# Patient Record
Sex: FEMALE | Race: BLACK OR AFRICAN AMERICAN | NOT HISPANIC OR LATINO | Employment: UNEMPLOYED | URBAN - METROPOLITAN AREA
[De-identification: names, ages, dates, MRNs, and addresses within clinical notes are randomized per-mention and may not be internally consistent; named-entity substitution may affect disease eponyms.]

---

## 2018-10-15 ENCOUNTER — OFFICE VISIT (OUTPATIENT)
Dept: URGENT CARE | Facility: CLINIC | Age: 48
End: 2018-10-15
Payer: MEDICAID

## 2018-10-15 VITALS
BODY MASS INDEX: 35.33 KG/M2 | OXYGEN SATURATION: 100 % | HEART RATE: 77 BPM | DIASTOLIC BLOOD PRESSURE: 72 MMHG | SYSTOLIC BLOOD PRESSURE: 109 MMHG | HEIGHT: 62 IN | RESPIRATION RATE: 16 BRPM | WEIGHT: 192 LBS | TEMPERATURE: 98 F

## 2018-10-15 DIAGNOSIS — R00.2 HEART PALPITATIONS: Primary | ICD-10-CM

## 2018-10-15 DIAGNOSIS — F41.9 ANXIETY: ICD-10-CM

## 2018-10-15 DIAGNOSIS — Z76.89 ENCOUNTER TO ESTABLISH CARE: ICD-10-CM

## 2018-10-15 LAB
GLUCOSE SERPL-MCNC: 90 MG/DL (ref 70–110)
POC ANION GAP: 16 MMOL/L (ref 10–20)
POC BUN: 12 MMOL/L (ref 8–26)
POC CHLORIDE: 102 MMOL/L (ref 98–109)
POC CREATININE: 0.7 MG/DL (ref 0.6–1.3)
POC HEMATOCRIT: 43 %PCV (ref 37–47)
POC HEMOGLOBIN: 14.6 G/DL (ref 12.5–16)
POC ICA: 1.3 MMOL/L (ref 1.12–1.32)
POC POTASSIUM: 4 MMOL/L (ref 3.5–4.9)
POC SODIUM: 139 MMOL/L (ref 138–146)
POC TCO2: 27 MMOL/L (ref 24–29)

## 2018-10-15 PROCEDURE — 80047 BASIC METABLC PNL IONIZED CA: CPT | Mod: QW,S$GLB,, | Performed by: NURSE PRACTITIONER

## 2018-10-15 PROCEDURE — 3008F BODY MASS INDEX DOCD: CPT | Mod: CPTII,S$GLB,, | Performed by: NURSE PRACTITIONER

## 2018-10-15 PROCEDURE — 99204 OFFICE O/P NEW MOD 45 MIN: CPT | Mod: S$GLB,,, | Performed by: NURSE PRACTITIONER

## 2018-10-15 RX ORDER — HYDROXYZINE PAMOATE 50 MG/1
50-100 CAPSULE ORAL 4 TIMES DAILY PRN
Qty: 30 CAPSULE | Refills: 0 | Status: SHIPPED | OUTPATIENT
Start: 2018-10-15 | End: 2021-03-30

## 2018-10-15 NOTE — PATIENT INSTRUCTIONS
Referral placed for internal medicine. Call [phone] to schedule an appointment. Dr. Cristin Cortes.   Avoid caffeine and stimulants.       Please return here or go to the Emergency Department for any concerns or worsening of condition.  If you were prescribed antibiotics, please take them to completion.  If you were prescribed a narcotic medication, do not drive or operate heavy equipment or machinery while taking these medications.  Please follow up with your primary care doctor or specialist as needed.    If you  smoke, please stop smoking.      Anxiety Reaction  Anxiety is the feeling we all get when we think something bad might happen. It is a normal response to stress and usually causes only a mild reaction. When anxiety becomes more severe, it can interfere with daily life. In some cases, you may not even be aware of what it is youre anxious about. There may also be a genetic link or it may be a learned behavior in the home.  Both psychological and physical triggers cause stress reaction. It's often a response to fear or emotional stress, real or imagined. This stress may come from home, family, work, or social relationships.  During an anxiety reaction, you may feel:  · Helpless  · Nervous  · Depressed  · Irritable  Your body may show signs of anxiety in many ways. You may experience:  · Dry mouth  · Shakiness  · Dizziness  · Weakness  · Trouble breathing  · Breathing fast (hyperventilating)  · Chest pressure  · Sweating  · Headache  · Nausea  · Diarrhea  · Tiredness  · Inability to sleep  · Sexual problems  Home care  · Try to locate the sources of stress in your life. They may not be obvious. These may include:  ¨ Daily hassles of life (traffic jams, missed appointments, car troubles, etc.)  ¨ Major life changes, both good (new baby, job promotion) and bad (loss of job, loss of loved one)  ¨ Overload: feeling that you have too many responsibilities and can't take care of all of them at once  ¨ Feeling  helpless, feeling that your problems are beyond what youre able to solve  · Notice how your body reacts to stress. Learn to listen to your body signals. This will help you take action before the stress becomes severe.  · When you can, do something about the source of your stress. (Avoid hassles, limit the amount of change that happens in your life at one time and take a break when you feel overloaded).  · Unfortunately, many stressful situations can't be avoided. It is necessary to learn how to better manage stress. There are many proven methods that will reduce your anxiety. These include simple things like exercise, good nutrition and adequate rest. Also, there are certain techniques that are helpful:  ¨ Relaxation  ¨ Breathing exercises  ¨ Visualization  ¨ Biofeedback  ¨ Meditation  For more information about this, consult your doctor or go to a local bookstore and review the many books and tapes available on this subject.  Follow-up care  If you feel that your anxiety is not responding to self-help measures, contact your doctor or make an appointment with a counselor. You may need short-term psychological counseling and temporary medicine to help you manage stress.  Call 911  Call your healthcare provider right away if any of these occur:  · Trouble breathing  · Confusion  · Drowsiness or trouble wakening  · Fainting or loss of consciousness  · Rapid heart rate  · Seizure  · New chest pain that becomes more severe, lasts longer, or spreads into your shoulder, arm, neck, jaw, or back  When to seek medical advice  Call your healthcare provider right away if any of these occur:  · Your symptoms get worse  · Severe headache not relieved by rest and mild pain reliever  Date Last Reviewed: 9/29/2015  © 2244-8567 [employer]. [address] [place] [address], [place] [address]. All rights reserved. This information is not intended as a substitute for professional medical care. Always follow your healthcare  professional's instructions.        Your Bodys Response to Anxiety    Normal anxiety is part of the bodys natural defense system. It's an alert to a threat that is unknown, vague, or comes from your own internal fears. While youre in this state, your feelings can range from a vague sense of worry to physical sensations such as a pounding heartbeat. These feelings make you want to react to the threat. An anxiety response is normal in many situations. But when you have an anxiety disorder, the same response can occur at the wrong times.  Anxiety can be helpful  Normal anxiety is a signal from your brain that warns you of a threat and is a normal response to help you prevent something or decrease the bad effects of something you can't control. For example, anxiety is a normal response to situations that might damage your body, separate you from a loved one, or lose your job. The symptoms of anxiety can be physical and mental.  How does it feel?  At certain times, people with anxiety may have:  · Dizziness  · Muscle tension or pain  · Restlessness  · Sleeplessness  · Trouble concentrating  · Racing heartbeat  · Fast breathing  · Shaking or trembling  · Stomachache  · Diarrhea  · Loss of energy  · Sweating  · Cold, clammy hands  · Chest pain  · Dry mouth  Anxiety can also be a problem  Anxiety can become a problem when it is hard to control, occurs for months, and interferes with important parts of your life. With an anxiety disorder, your body has the response described above, but in inappropriate ways. The response a person has depends on the anxiety disorder he or she has. With some disorders, the anxiety is way out of proportion to the threat that triggers it. With others, anxiety may occur even when there isnt a clear threat or trigger.  Who does it affect?  Some people are more prone to persistent anxiety than others. It tends to run in families, and it affects more younger people than older people, and more  "women than men. But no age, race, or gender is immune to anxiety problems.  Anxiety can be treated  The good news is that the anxiety thats disrupting your life can be treated. Check with your healthcare provider and rule out any physical problems that may be causing the anxiety symptoms. If an anxiety disorder is diagnosed seek mental healthcare. This is an illness and it can respond to treatment. Most types of anxiety disorders will respond to "talk therapy" and medicines. Working with your doctor or other healthcare provider, you can develop skills to help you cope with anxiety. You can also gain the perspective you need to overcome your fears. Note: Good sources of support or guidance can be found at your local hospital, mental health clinic, or an employee assistance program.  How to cope with anxiety  If anxiety is wearing you down, here are some things you can do to cope:  · Keep in mind that you cant control everything about a situation. Change what you can and let the rest take its course.  · Exercise--its a great way to relieve tension and help your body feel relaxed.  · Avoid caffeine and nicotine, which can make anxiety symptoms worse.  · Fight the temptation to turn to alcohol or unprescribed drugs for relief. They only make things worse in the long run.  · Educate yourself about anxiety disorders. Keep track of helpful online resources and books you can use during stressful periods.  · Try stress management techniques such as meditation.  · Consider online or in-person support groups.   Date Last Reviewed: 1/1/2017  © 8417-8524 [employer]. [address] [place] [address], [place] [address]. All rights reserved. This information is not intended as a substitute for professional medical care. Always follow your healthcare professional's instructions.        Heart Palpitations    Palpitations are the feeling that your heart is beating hard, fast, or irregular. Some describe it as "pounding" or " ""skipped beats." Palpitations may occur in someone with heart disease, but can also occur in a healthy person.  Heart-related causes:  · Arrhythmia (a change from the heart's normal rhythm)  · Heart valve disease  · Disease of the heart muscle  · Coronary artery disease  · High blood pressure  Non-heart-related causes:  · Certain medicines (such as asthma inhalers and decongestants)  · Some herbal supplements, energy drinks and pills, and weight loss pills  · Illegal stimulant drugs (such as cocaine, crank, methamphetamine, PCP, bath salts, ecstasy)  · Caffeine, alcohol, and tobacco  · Medical conditions such as thyroid disease, anemia, anxiety, and panic disorder  Sometimes the cause can't be found.  Home care  Follow these home care tips:  · Avoid excess caffeine, alcohol, tobacco, and any stimulant drugs.  · Tell your doctor about any prescription or over-the-counter or herbal medicines you take.  Follow-up care  · Follow up with your doctor, or as advised.  Call 911  This is the fastest and safest way to get to the emergency department. The paramedics can also begin treatment on the way to the hospital, if needed.  Don't wait until your symptoms are severe to call 911. These are reasons to call 911:  · Chest pain  · Shortness of breath  · Feeling lightheaded, faint, or dizzy  · Fainting or loss of consciousness  · Very irregular heartbeat  · Rapid heartbeat that makes you uncomfortable  · Slower than usual heart rate associated with symptoms  · Slower than usual heart rate  · Chest pain with weakness, dizziness, heavy sweating, nausea, or vomiting  · Extreme drowsiness or confusion  · Weakness of an arm or leg, or on 1 side of the face  · Difficulty with speech or vision  When to seek medical advice  Get prompt medical attention if you have palpitations and any of the following:  · Weakness  · Dizziness  · Lightheadedness  · Fainting  Date Last Reviewed: 4/27/2016  © 0480-8187 [employer] [address] [place] [address]. All rights reserved. This information is not intended as a substitute for professional medical care. Always follow your healthcare professional's instructions.

## 2018-10-15 NOTE — PROGRESS NOTES
"Subjective:       Patient ID: Kathrine Kern is a 48 y.o. female.    Vitals:  height is 5' 2" (1.575 m) and weight is 87.1 kg (192 lb). Her temperature is 98.1 °F (36.7 °C). Her blood pressure is 109/72 and her pulse is 77. Her respiration is 16 and oxygen saturation is 100%.     Chief Complaint: Palpitations      C/o heart palpitations, chest pressure, SOB, HA, fatigue x 1 week. S/s worsened on Friday.   Reports bilateral posterior leg pain for 1 month- bilateral achy.   Suffers with anxiety- took some hydralazine no relief with this episode, but reports medication is old and may be .  Reports with anxiety she felt the heart palpitations and SOB in the past.   Denies personal hx of HTN, HLD, diabetes.   Family hx- Patient's mom has cardiomyopathy. Mom has had " a light stroke".  No reported deficits.   Denies personal hx of clotting disorders, DVT/PE, cancer, HRT or birthcontrol, smoking, recent surgeries or procedures, recent bone fractures.   Sh did recently drive to [place] for 5 hours last Monday. She reports she was feeling bad before she went on the trip.   Denies fever, chills, N/V, diaphoresis.   She drinks 1 cup of coffee per day.   She has recently been until a lot of stress- with work and financial stress. She has been dealing with anxiety on and off for about 10 + years. She is not on any daily medications.   She did just start a no carb diet as well.   She would like a new PCP. She was previously following with [facility].       Palpitations    This is a new problem. The current episode started in the past 7 days. The problem occurs constantly. The problem has been gradually worsening. Associated symptoms include anxiety, an irregular heartbeat, malaise/fatigue and shortness of breath. Pertinent negatives include no chest pain, fever, nausea or vomiting. Risk factors include family history.     Review of Systems   Constitution: Positive for malaise/fatigue. Negative for chills, " decreased appetite and fever.   HENT: Negative for sore throat.    Eyes: Negative for blurred vision.   Cardiovascular: Positive for irregular heartbeat and palpitations. Negative for chest pain.        Chest pressure      Respiratory: Positive for shortness of breath.    Skin: Negative for rash.   Musculoskeletal: Positive for myalgias. Negative for back pain and joint pain.   Gastrointestinal: Negative for abdominal pain, diarrhea, nausea and vomiting.   Neurological: Positive for headaches.   Psychiatric/Behavioral: Positive for depression and memory loss. Negative for altered mental status, hallucinations, hypervigilance, substance abuse, suicidal ideas and thoughts of violence. The patient has insomnia and is nervous/anxious.         Difficulty concentration.        Objective:      Physical Exam   Constitutional: She is oriented to person, place, and time. She appears well-developed and well-nourished. She is cooperative.  Non-toxic appearance. She does not have a sickly appearance. She does not appear ill. No distress.   Obese female.   NAD   HENT:   Head: Normocephalic and atraumatic.   Right Ear: Hearing, tympanic membrane, external ear and ear canal normal.   Left Ear: Hearing, tympanic membrane, external ear and ear canal normal.   Nose: Nose normal. No mucosal edema, rhinorrhea or nasal deformity. No epistaxis. Right sinus exhibits no maxillary sinus tenderness and no frontal sinus tenderness. Left sinus exhibits no maxillary sinus tenderness and no frontal sinus tenderness.   Mouth/Throat: Uvula is midline, oropharynx is clear and moist and mucous membranes are normal. No trismus in the jaw. Normal dentition. No uvula swelling. No posterior oropharyngeal erythema.   Eyes: Conjunctivae, EOM and lids are normal. Pupils are equal, round, and reactive to light. Right eye exhibits no discharge. Left eye exhibits no discharge. No scleral icterus.   Sclera clear bilat   Neck: Trachea normal, normal range of  motion, full passive range of motion without pain and phonation normal. Neck supple.   Cardiovascular: Normal rate, regular rhythm, normal heart sounds, intact distal pulses and normal pulses.   Pulses:       Popliteal pulses are 2+ on the right side, and 2+ on the left side.        Dorsalis pedis pulses are 2+ on the right side, and 2+ on the left side.        Posterior tibial pulses are 2+ on the right side, and 2+ on the left side.   No lower leg edema   Right calf measuring 16 inches circumference   Left calf measuring 16 inches circumference.   Negative Marcial's sign   No calf tenderness   Pain to posterior fossa- bilaterally    Pulmonary/Chest: Effort normal and breath sounds normal. No accessory muscle usage or stridor. No tachypnea. No respiratory distress. She has no decreased breath sounds. She has no wheezes. She has no rhonchi. She has no rales.   Abdominal: Soft. Normal appearance and bowel sounds are normal. She exhibits no distension, no pulsatile midline mass and no mass. There is no tenderness.   Musculoskeletal: Normal range of motion. She exhibits no edema or deformity.        Legs:  No swelling, redness, or warmth to calves.   Calves are measuring the same bilaterally   Negative Marcial's sign   Non tender to touch   NV intact.    Neurological: She is alert and oriented to person, place, and time. She exhibits normal muscle tone. Coordination normal.   Skin: Skin is warm, dry and intact. Capillary refill takes less than 2 seconds. She is not diaphoretic. No pallor.   Psychiatric: Her speech is normal and behavior is normal. Judgment and thought content normal. Her mood appears anxious. Cognition and memory are normal.   Denies SI, HI or AV hallcinations.    Nursing note and vitals reviewed.        EKG done in clinic today. NSR. No ST elevation or depression. HR 85. No previous EKG on file for comparison.     Results for orders placed or performed in visit on 10/15/18   POCT Chemistry Panel (Manual)    Result Value Ref Range    POC Sodium 139 138 - 146 MMOL/L    POC Potassium 4.0 3.5 - 4.9 MMOL/L    POC Chloride 102 98 - 109 MMOL/L    POC BUN 12 8 - 26 MMOL/L    POC Glucose 90 70 - 110 MG/DL    POC Creatinine 0.7 0.6 - 1.3 mg/dL    POC iCA 1.30 1.12 - 1.32 MMOL/L    POC TCO2 27 24 - 29 MMOL/L    POC Hematocrit 43 37 - 47 %PCV    POC Hemoglobin 14.6 12.5 - 16 g/dL    POC Anion Gap 16 10.0 - 20 MMOL/L       Assessment:       1. Heart palpitations    2. Anxiety    3. Encounter to establish care        Plan:         Heart palpitations  -     IN OFFICE EKG 12-LEAD (to [facility])  -     POCT Chemistry Panel (Manual)  -     Ambulatory referral to Internal Medicine    Anxiety  -     Ambulatory referral to Internal Medicine  -     hydrOXYzine pamoate (VISTARIL) 50 MG Cap; Take 1-2 capsules ( mg total) by mouth 4 (four) times daily as needed (anxiety - may cause sedation.).  Dispense: 30 capsule; Refill: 0    Encounter to establish care  -     Ambulatory referral to Internal Medicine       She deferred bilateral lower leg US.  Based on s/s and presentation, I feel that her s/s are r/t to anxiety / life stressors.  May benefit from a daily SSRI. She deferred that today and would like to try Vistaril as she has been using old and  Vistaril.   S/s present for 1 week or more. NAD. VSS. POCT Chem 8 WNL.   Discussed with patient that if s/s worsen or change in any factor to please go to the ER for further testing.   I did discuss diff dx with patient which included but are not limited to MI, PE, DVT, arrhthymias.   Discussed with patient the physical impact anxiety can have on our bodies.   Patient agrees with plan of care. Referral placed for establishment with a new PCP per patient's request.  Discussed avoiding caffeine and stimulans.    Encouraged follow up in the next 1-2 weeks for a recheck.   ER precautions reviewed and printed material provided.     Spent 30 minutes with patient with > 50% time spent in  counseling.     Patient Instructions     Referral placed for internal medicine. Call [phone] to schedule an appointment. Dr. Cristin Cortes.   Avoid caffeine and stimulants.       Please return here or go to the Emergency Department for any concerns or worsening of condition.  If you were prescribed antibiotics, please take them to completion.  If you were prescribed a narcotic medication, do not drive or operate heavy equipment or machinery while taking these medications.  Please follow up with your primary care doctor or specialist as needed.    If you  smoke, please stop smoking.      Anxiety Reaction  Anxiety is the feeling we all get when we think something bad might happen. It is a normal response to stress and usually causes only a mild reaction. When anxiety becomes more severe, it can interfere with daily life. In some cases, you may not even be aware of what it is youre anxious about. There may also be a genetic link or it may be a learned behavior in the home.  Both psychological and physical triggers cause stress reaction. It's often a response to fear or emotional stress, real or imagined. This stress may come from home, family, work, or social relationships.  During an anxiety reaction, you may feel:  · Helpless  · Nervous  · Depressed  · Irritable  Your body may show signs of anxiety in many ways. You may experience:  · Dry mouth  · Shakiness  · Dizziness  · Weakness  · Trouble breathing  · Breathing fast (hyperventilating)  · Chest pressure  · Sweating  · Headache  · Nausea  · Diarrhea  · Tiredness  · Inability to sleep  · Sexual problems  Home care  · Try to locate the sources of stress in your life. They may not be obvious. These may include:  ¨ Daily hassles of life (traffic jams, missed appointments, car troubles, etc.)  ¨ Major life changes, both good (new baby, job promotion) and bad (loss of job, loss of loved one)  ¨ Overload: feeling that you have too many responsibilities and can't  take care of all of them at once  ¨ Feeling helpless, feeling that your problems are beyond what youre able to solve  · Notice how your body reacts to stress. Learn to listen to your body signals. This will help you take action before the stress becomes severe.  · When you can, do something about the source of your stress. (Avoid hassles, limit the amount of change that happens in your life at one time and take a break when you feel overloaded).  · Unfortunately, many stressful situations can't be avoided. It is necessary to learn how to better manage stress. There are many proven methods that will reduce your anxiety. These include simple things like exercise, good nutrition and adequate rest. Also, there are certain techniques that are helpful:  ¨ Relaxation  ¨ Breathing exercises  ¨ Visualization  ¨ Biofeedback  ¨ Meditation  For more information about this, consult your doctor or go to a local bookstore and review the many books and tapes available on this subject.  Follow-up care  If you feel that your anxiety is not responding to self-help measures, contact your doctor or make an appointment with a counselor. You may need short-term psychological counseling and temporary medicine to help you manage stress.  Call 911  Call your healthcare provider right away if any of these occur:  · Trouble breathing  · Confusion  · Drowsiness or trouble wakening  · Fainting or loss of consciousness  · Rapid heart rate  · Seizure  · New chest pain that becomes more severe, lasts longer, or spreads into your shoulder, arm, neck, jaw, or back  When to seek medical advice  Call your healthcare provider right away if any of these occur:  · Your symptoms get worse  · Severe headache not relieved by rest and mild pain reliever  Date Last Reviewed: 9/29/2015  © 9347-8223 [employer]. [address] [place] [address], [place] [address]. All rights reserved. This information is not intended as a substitute for professional medical  care. Always follow your healthcare professional's instructions.        Your Bodys Response to Anxiety    Normal anxiety is part of the bodys natural defense system. It's an alert to a threat that is unknown, vague, or comes from your own internal fears. While youre in this state, your feelings can range from a vague sense of worry to physical sensations such as a pounding heartbeat. These feelings make you want to react to the threat. An anxiety response is normal in many situations. But when you have an anxiety disorder, the same response can occur at the wrong times.  Anxiety can be helpful  Normal anxiety is a signal from your brain that warns you of a threat and is a normal response to help you prevent something or decrease the bad effects of something you can't control. For example, anxiety is a normal response to situations that might damage your body, separate you from a loved one, or lose your job. The symptoms of anxiety can be physical and mental.  How does it feel?  At certain times, people with anxiety may have:  · Dizziness  · Muscle tension or pain  · Restlessness  · Sleeplessness  · Trouble concentrating  · Racing heartbeat  · Fast breathing  · Shaking or trembling  · Stomachache  · Diarrhea  · Loss of energy  · Sweating  · Cold, clammy hands  · Chest pain  · Dry mouth  Anxiety can also be a problem  Anxiety can become a problem when it is hard to control, occurs for months, and interferes with important parts of your life. With an anxiety disorder, your body has the response described above, but in inappropriate ways. The response a person has depends on the anxiety disorder he or she has. With some disorders, the anxiety is way out of proportion to the threat that triggers it. With others, anxiety may occur even when there isnt a clear threat or trigger.  Who does it affect?  Some people are more prone to persistent anxiety than others. It tends to run in families, and it affects more younger  "people than older people, and more women than men. But no age, race, or gender is immune to anxiety problems.  Anxiety can be treated  The good news is that the anxiety thats disrupting your life can be treated. Check with your healthcare provider and rule out any physical problems that may be causing the anxiety symptoms. If an anxiety disorder is diagnosed seek mental healthcare. This is an illness and it can respond to treatment. Most types of anxiety disorders will respond to "talk therapy" and medicines. Working with your doctor or other healthcare provider, you can develop skills to help you cope with anxiety. You can also gain the perspective you need to overcome your fears. Note: Good sources of support or guidance can be found at your local hospital, mental health clinic, or an employee assistance program.  How to cope with anxiety  If anxiety is wearing you down, here are some things you can do to cope:  · Keep in mind that you cant control everything about a situation. Change what you can and let the rest take its course.  · Exercise--its a great way to relieve tension and help your body feel relaxed.  · Avoid caffeine and nicotine, which can make anxiety symptoms worse.  · Fight the temptation to turn to alcohol or unprescribed drugs for relief. They only make things worse in the long run.  · Educate yourself about anxiety disorders. Keep track of helpful online resources and books you can use during stressful periods.  · Try stress management techniques such as meditation.  · Consider online or in-person support groups.   Date Last Reviewed: 1/1/2017  © 0867-4836 [employer]. [address] [place] [address], [place] [address]. All rights reserved. This information is not intended as a substitute for professional medical care. Always follow your healthcare professional's instructions.        Heart Palpitations    Palpitations are the feeling that your heart is beating hard, fast, or irregular. " "Some describe it as "pounding" or "skipped beats." Palpitations may occur in someone with heart disease, but can also occur in a healthy person.  Heart-related causes:  · Arrhythmia (a change from the heart's normal rhythm)  · Heart valve disease  · Disease of the heart muscle  · Coronary artery disease  · High blood pressure  Non-heart-related causes:  · Certain medicines (such as asthma inhalers and decongestants)  · Some herbal supplements, energy drinks and pills, and weight loss pills  · Illegal stimulant drugs (such as cocaine, crank, methamphetamine, PCP, bath salts, ecstasy)  · Caffeine, alcohol, and tobacco  · Medical conditions such as thyroid disease, anemia, anxiety, and panic disorder  Sometimes the cause can't be found.  Home care  Follow these home care tips:  · Avoid excess caffeine, alcohol, tobacco, and any stimulant drugs.  · Tell your doctor about any prescription or over-the-counter or herbal medicines you take.  Follow-up care  · Follow up with your doctor, or as advised.  Call 911  This is the fastest and safest way to get to the emergency department. The paramedics can also begin treatment on the way to the hospital, if needed.  Don't wait until your symptoms are severe to call 911. These are reasons to call 911:  · Chest pain  · Shortness of breath  · Feeling lightheaded, faint, or dizzy  · Fainting or loss of consciousness  · Very irregular heartbeat  · Rapid heartbeat that makes you uncomfortable  · Slower than usual heart rate associated with symptoms  · Slower than usual heart rate  · Chest pain with weakness, dizziness, heavy sweating, nausea, or vomiting  · Extreme drowsiness or confusion  · Weakness of an arm or leg, or on 1 side of the face  · Difficulty with speech or vision  When to seek medical advice  Get prompt medical attention if you have palpitations and any of the following:  · Weakness  · Dizziness  · Lightheadedness  · Fainting  Date Last Reviewed: 4/27/2016  © 3515-9640 " [employer]. [address] [place] [address], [place] [address]. All rights reserved. This information is not intended as a substitute for professional medical care. Always follow your healthcare professional's instructions.

## 2018-10-23 PROCEDURE — 99285 EMERGENCY DEPT VISIT HI MDM: CPT | Mod: 25

## 2018-10-24 ENCOUNTER — HOSPITAL ENCOUNTER (EMERGENCY)
Facility: OTHER | Age: 48
Discharge: HOME | End: 2018-10-24
Attending: EMERGENCY MEDICINE
Payer: MEDICAID

## 2018-10-24 VITALS
OXYGEN SATURATION: 99 % | BODY MASS INDEX: 40.48 KG/M2 | WEIGHT: 220 LBS | TEMPERATURE: 98 F | RESPIRATION RATE: 18 BRPM | HEIGHT: 62 IN | DIASTOLIC BLOOD PRESSURE: 79 MMHG | HEART RATE: 84 BPM | SYSTOLIC BLOOD PRESSURE: 139 MMHG

## 2018-10-24 DIAGNOSIS — R00.2 PALPITATIONS: ICD-10-CM

## 2018-10-24 DIAGNOSIS — M79.604 BILATERAL LEG PAIN: Primary | ICD-10-CM

## 2018-10-24 DIAGNOSIS — M79.605 BILATERAL LEG PAIN: Primary | ICD-10-CM

## 2018-10-24 LAB
ANION GAP SERPL CALC-SCNC: 9 MMOL/L
BASOPHILS # BLD AUTO: 0.01 K/UL
BASOPHILS NFR BLD: 0.2 %
BUN SERPL-MCNC: 11 MG/DL
CALCIUM SERPL-MCNC: 9.6 MG/DL
CHLORIDE SERPL-SCNC: 108 MMOL/L
CO2 SERPL-SCNC: 24 MMOL/L
CREAT SERPL-MCNC: 0.9 MG/DL
D DIMER PPP IA.FEU-MCNC: 0.52 MG/L FEU
DIFFERENTIAL METHOD: ABNORMAL
EOSINOPHIL # BLD AUTO: 0.2 K/UL
EOSINOPHIL NFR BLD: 3.3 %
ERYTHROCYTE [DISTWIDTH] IN BLOOD BY AUTOMATED COUNT: 13.4 %
EST. GFR  (AFRICAN AMERICAN): >60 ML/MIN/1.73 M^2
EST. GFR  (NON AFRICAN AMERICAN): >60 ML/MIN/1.73 M^2
GLUCOSE SERPL-MCNC: 101 MG/DL
HCT VFR BLD AUTO: 37.1 %
HGB BLD-MCNC: 11.9 G/DL
LYMPHOCYTES # BLD AUTO: 2.3 K/UL
LYMPHOCYTES NFR BLD: 42.3 %
MCH RBC QN AUTO: 30.5 PG
MCHC RBC AUTO-ENTMCNC: 32.1 G/DL
MCV RBC AUTO: 95 FL
MONOCYTES # BLD AUTO: 0.5 K/UL
MONOCYTES NFR BLD: 9.3 %
NEUTROPHILS # BLD AUTO: 2.5 K/UL
NEUTROPHILS NFR BLD: 44.7 %
PLATELET # BLD AUTO: 214 K/UL
PMV BLD AUTO: 10.7 FL
POTASSIUM SERPL-SCNC: 4.3 MMOL/L
RBC # BLD AUTO: 3.9 M/UL
SODIUM SERPL-SCNC: 141 MMOL/L
TSH SERPL DL<=0.005 MIU/L-ACNC: 2.25 UIU/ML
WBC # BLD AUTO: 5.48 K/UL

## 2018-10-24 PROCEDURE — 85025 COMPLETE CBC W/AUTO DIFF WBC: CPT

## 2018-10-24 PROCEDURE — 80048 BASIC METABOLIC PNL TOTAL CA: CPT

## 2018-10-24 PROCEDURE — 25500020 PHARM REV CODE 255: Performed by: EMERGENCY MEDICINE

## 2018-10-24 PROCEDURE — 84443 ASSAY THYROID STIM HORMONE: CPT

## 2018-10-24 PROCEDURE — 93005 ELECTROCARDIOGRAM TRACING: CPT

## 2018-10-24 PROCEDURE — 93010 ELECTROCARDIOGRAM REPORT: CPT | Mod: ,,, | Performed by: INTERNAL MEDICINE

## 2018-10-24 PROCEDURE — 85379 FIBRIN DEGRADATION QUANT: CPT

## 2018-10-24 RX ADMIN — IOHEXOL 75 ML: 350 INJECTION, SOLUTION INTRAVENOUS at 01:10

## 2018-10-24 NOTE — ED PROVIDER NOTES
Encounter Date: 10/23/2018    SCRIBE #1 NOTE: IKumar, am scribing for, and in the presence of, Dr. Aparicio.       History     Chief Complaint   Patient presents with    Multiple Medical Complaints     Pt came to the ED tonight c.o palpitations, hoarseness, left leg pain, and head pain. x couple weeks. Pt seen at  and was told to follow up but could not do to insurance     Time seen by provider: 12:12 AM    This is a 48 y.o. female with a history of anxiety who presents with complaint of shortness of breath that began approximately one month ago. She reports that she is also experiencing chest tightness, voice change, palpitations, headache, and dizziness. The episodes of palpitations occur intermittently during the day. She reports that the episodes of shortness of breath and chest tightness occur mainly at night. She reports that she went to urgent care and reports that she had no relief of her symptoms. The patient reports that she took a small portion of her daughter's Lexapro pill a few times because she has been under stress.     She reports that she is also experiencing bilateral leg pain that began two months ago. The leg pain is described as cramping. She reports that was seen by her primary care physician and was told that she was told to stop high impact exercise. 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 She denies fever, chest pain, sore throat, nausea, abdominal pain, and dysuria.      The history is provided by the patient.     Review of patient's allergies indicates:   Allergen Reactions    Sulfa (sulfonamide antibiotics)      Past Medical History:   Diagnosis Date    Allergy     Anxiety      Past Surgical History:   Procedure Laterality Date    HYSTERECTOMY      TUBAL LIGATION       Family History   Problem Relation Age of Onset    Hypertension Mother     Cancer Father     Melanoma Neg Hx      Social History     Tobacco Use    Smoking status:  Never Smoker   Substance Use Topics    Alcohol use: Yes     Comment: Socially    Drug use: No     Review of Systems   Constitutional: Negative for fever.   HENT: Positive for voice change. Negative for sore throat.    Respiratory: Positive for chest tightness and shortness of breath.    Cardiovascular: Positive for palpitations. Negative for chest pain.   Gastrointestinal: Negative for nausea.   Genitourinary: Negative for dysuria.   Musculoskeletal: Negative for back pain.        Bilateral leg pain.   Skin: Negative for rash.   Neurological: Positive for headaches. Negative for weakness.   Hematological: Does not bruise/bleed easily.       Physical Exam     Initial Vitals [10/24/18 0000]   BP Pulse Resp Temp SpO2   132/63 76 19 98.6 °F (37 °C) 99 %      MAP       --         Physical Exam    Nursing note and vitals reviewed.  Constitutional: She appears well-developed and well-nourished. She is not diaphoretic. No distress.   HENT:   Head: Normocephalic and atraumatic.   Eyes: Conjunctivae are normal.   Neck: No JVD present.   Cardiovascular: Normal rate, regular rhythm and normal heart sounds. Exam reveals no gallop and no friction rub.    No murmur heard.  Pulses:       Dorsalis pedis pulses are 2+ on the right side, and 2+ on the left side.   Pulmonary/Chest: Breath sounds normal. No respiratory distress. She has no wheezes. She has no rhonchi. She has no rales. She exhibits no tenderness.   Abdominal: Soft. There is no tenderness. There is no rebound and no guarding.   Musculoskeletal: Normal range of motion. She exhibits no edema or tenderness.   No leg edema. No calf tenderness.   Neurological: She is alert and oriented to person, place, and time.   Skin: Skin is warm and dry. No rash and no abscess noted. No erythema. No pallor.   Psychiatric: She has a normal mood and affect. Her behavior is normal. Judgment and thought content normal.         ED Course   Procedures  Labs Reviewed   D DIMER, QUANTITATIVE -  Abnormal; Notable for the following components:       Result Value    D-Dimer 0.52 (*)     All other components within normal limits   CBC W/ AUTO DIFFERENTIAL - Abnormal; Notable for the following components:    RBC 3.90 (*)     Hemoglobin 11.9 (*)     All other components within normal limits   TSH   BASIC METABOLIC PANEL     EKG Readings: (Independently Interpreted)   Normal sinus rhythm at a rate of 78 bpm. First degrees block AV block. ST elevation in AVF only. No old for comparison.       Imaging Results          CTA Chest Non-Coronary (PE Study) (Final result)  Result time 10/24/18 01:57:59    Final result by Jesse Dave MD (10/24/18 01:57:59)                 Impression:      No evidence of PE.  No acute intrathoracic process identified.      Electronically signed by: Jesse Dave MD  Date:    10/24/2018  Time:    01:57             Narrative:    EXAMINATION:  CTA CHEST NON CORONARY    CLINICAL HISTORY:  Chest pain, acute, PE suspected, intermed prob, positive D-dimer;    TECHNIQUE:  Low dose axial images, sagittal and coronal reformations were obtained from the thoracic inlet to the lung bases following the IV administration of 75 mL of Omnipaque 350.  Contrast timing was optimized to evaluate the pulmonary arteries.  MIP images were performed.    COMPARISON:  None    FINDINGS:  Structures at the base of the neck are unremarkable.  Aorta is non-aneurysmal.  The heart is normal in size without pericardial effusion.  No intraluminal filling defects within the pulmonary arteries to suggest pulmonary thromboembolism.   There is no evidence of mediastinal, axillary, or hilar lymph node enlargement.  The esophagus maintains a normal course and caliber.    The trachea and bronchi are patent.  The lungs are symmetrically expanded.  Lungs are clear with no evidence of pulmonary mass, consolidation, or effusion.    The visualized abdominal structures are unremarkable.  Osseous structures and extrathoracic soft  tissues are unremarkable.                                 Medical Decision Making:   Clinical Tests:   Lab Tests: Ordered and Reviewed  Medical Tests: Ordered and Reviewed    Additional MDM:   Comments: 48-year-old female presents afebrile, hemodynamically stable and well-appearing with complaint of palpitations that have been present for approximately 1 month as well as bilateral posterior thigh and anterior knee pain which has been present for over 1 month.  The leg pain is described as a cramping sensation.  She has been evaluated for the same complaint urgent care recently.  Physical exam was unremarkable.  Her presentation is atypical for a DVT.   EKG was a normal sinus rhythm. Labs including TSH, CBC, BMP and D-dimer were obtained.  Labs are significant only for a minimally elevated D-dimer at 0.52.  Given the elevated D-dimer, a CT PE study was obtained which was also within normal limits. These results were discussed with the patient as well as the plan to have her follow up with [facility] for further evaluation and possible Holter monitor.  Patient verbalized understanding and agreement with the plan and she was discharged home in stable condition..          Scribe Attestation:   Scribe #1: I performed the above scribed service and the documentation accurately describes the services I performed. I attest to the accuracy of the note.    Attending Attestation:           Physician Attestation for Scribe:  Physician Attestation Statement for Scribe #1: I, Dr. Aparicio, reviewed documentation, as scribed by Kumar Roca in my presence, and it is both accurate and complete.                    Clinical Impression:     1. Bilateral leg pain    2. Palpitations                                   Sharri Aparicio MD  10/24/18 0224

## 2018-10-24 NOTE — ED TRIAGE NOTES
"Pt presents to the ed with c/o heart palpitations, left leg pain, hoarseness, and HA x 3 weeks. Pt reports being seen at urgent care and was told to follow up but could not due to insurance. Pt states, "It feels like I can't catch my breath." Pt is aao, RR even and unlabored. NAD noted.  "

## 2018-10-24 NOTE — ED TRIAGE NOTES
Pt came to the ED tonight c.o. Multiple medical complaints at this time x couple weeks. Pt states she is having headaches, chest palpitations, and left leg pain x couple weeks. Pt saw a practitioner for her symptoms last week and tried to make a follow up appointment but was denied due to insurance so pt is still having symptoms. Pt states that the symptoms are unchanging and not resolved when she was prescribed medication at the urgent care. Pt is aaox4 and does not appear to be in acute distress. Pt will be monitored at this time

## 2020-01-13 ENCOUNTER — OFFICE VISIT (OUTPATIENT)
Dept: OBSTETRICS AND GYNECOLOGY | Facility: CLINIC | Age: 50
End: 2020-01-13
Payer: COMMERCIAL

## 2020-01-13 ENCOUNTER — TELEPHONE (OUTPATIENT)
Dept: OBSTETRICS AND GYNECOLOGY | Facility: CLINIC | Age: 50
End: 2020-01-13

## 2020-01-13 VITALS — SYSTOLIC BLOOD PRESSURE: 120 MMHG | DIASTOLIC BLOOD PRESSURE: 78 MMHG | BODY MASS INDEX: 41.9 KG/M2 | WEIGHT: 229.06 LBS

## 2020-01-13 DIAGNOSIS — Z12.31 VISIT FOR SCREENING MAMMOGRAM: ICD-10-CM

## 2020-01-13 DIAGNOSIS — Z01.419 ROUTINE GYNECOLOGICAL EXAMINATION: Primary | ICD-10-CM

## 2020-01-13 DIAGNOSIS — N95.2 ATROPHIC VAGINITIS: ICD-10-CM

## 2020-01-13 LAB
C TRACH DNA SPEC QL NAA+PROBE: NOT DETECTED
N GONORRHOEA DNA SPEC QL NAA+PROBE: NOT DETECTED

## 2020-01-13 PROCEDURE — 99386 PREV VISIT NEW AGE 40-64: CPT | Mod: S$GLB,,, | Performed by: OBSTETRICS & GYNECOLOGY

## 2020-01-13 PROCEDURE — 99999 PR PBB SHADOW E&M-EST. PATIENT-LVL III: ICD-10-PCS | Mod: PBBFAC,,, | Performed by: OBSTETRICS & GYNECOLOGY

## 2020-01-13 PROCEDURE — 87491 CHLMYD TRACH DNA AMP PROBE: CPT

## 2020-01-13 PROCEDURE — 99386 PR PREVENTIVE VISIT,NEW,40-64: ICD-10-PCS | Mod: S$GLB,,, | Performed by: OBSTETRICS & GYNECOLOGY

## 2020-01-13 PROCEDURE — 99999 PR PBB SHADOW E&M-EST. PATIENT-LVL III: CPT | Mod: PBBFAC,,, | Performed by: OBSTETRICS & GYNECOLOGY

## 2020-01-13 RX ORDER — ESTRADIOL 0.1 MG/G
1 CREAM VAGINAL
Qty: 42.5 G | Refills: 3 | Status: SHIPPED | OUTPATIENT
Start: 2020-01-13 | End: 2021-03-30

## 2020-01-13 RX ORDER — FAMOTIDINE 40 MG/1
40 TABLET, FILM COATED ORAL
COMMUNITY
Start: 2019-03-28 | End: 2020-07-15 | Stop reason: HOSPADM

## 2020-01-13 RX ORDER — ERGOCALCIFEROL 1.25 MG/1
50000 CAPSULE ORAL
COMMUNITY
End: 2021-03-30

## 2020-01-13 NOTE — PROGRESS NOTES
48 yo female s/p PAVITHRA in 2012 with Dr. Rosenbaum for benign reasons who presents for routine gyn visit.  Patient concerned about vaginal atrophy. Has used estrace cream in the past for this problem.   Recently was sexually active with new male partner. He removed condom and now patient is concerned about STDs.  Reports h/o dense breast tissue.   Mammogram last year was normal.    ROS:  GENERAL: Denies weight gain or weight loss. Feeling well overall.   SKIN: Denies rash or lesions.   HEAD: Denies head injury or headache.   NODES: Denies enlarged lymph nodes.   CHEST: Denies chest pain or shortness of breath.   CARDIOVASCULAR: Denies palpitations or left sided chest pain.   ABDOMEN: No abdominal pain, constipation, diarrhea, nausea, vomiting or rectal bleeding.   URINARY: No frequency, dysuria, hematuria, or burning on urination.  REPRODUCTIVE: See HPI.   BREASTS:  and denies pain, lumps, or nipple discharge.   HEMATOLOGIC: No easy bruisability or excessive bleeding.   MUSCULOSKELETAL: Denies joint pain or swelling.   NEUROLOGIC: Denies syncope or weakness.   PSYCHIATRIC: Denies depression, anxiety or mood swings.         PE:   Vitals: /78   Wt 103.9 kg (229 lb 0.9 oz)   BMI 41.90 kg/m²   APPEARANCE: Well nourished, well developed, in no acute distress.  SKIN: Normal skin turgor, no lesions.  CHEST: Lungs clear to auscultation.  HEART: Regular rate and rhythm, no murmurs, rubs or gallops.  ABDOMEN: Soft. No tenderness or masses. No hepatosplenomegaly. No hernias.  BREASTS: Symmetrical, no skin changes or visible lesions. No palpable masses, nipple discharge or adenopathy bilaterally.  PELVIC: Normal external female genitalia without lesions. Normal hair distribution. Adequate perineal body, normal urethral meatus. Vagina moist and well rugated without lesions or discharge. Uterus/cervix surgically absent; No significant cystocele or rectocele. Bimanual exam showed vaginal cuff intact, nontender. Adnexa without  masses or tenderness. Urethra and bladder normal.  EXTREMITIES: No clubbing cyanosis or edema.    AP  Routine gyn  -s/p normal breast exam: mammogram ordered  -s/p normal pelvic exam:   -Pap not indicated  -STD testing: gc/chl, hiv, and urine gc/chl ordered - may need repeat in 6 wks as most recent unprotected sex was just this weekend.    DANAE Jones MD

## 2020-01-16 ENCOUNTER — HOSPITAL ENCOUNTER (OUTPATIENT)
Dept: RADIOLOGY | Facility: HOSPITAL | Age: 50
Discharge: HOME | End: 2020-01-16
Attending: OBSTETRICS & GYNECOLOGY
Payer: COMMERCIAL

## 2020-01-16 DIAGNOSIS — Z01.419 ROUTINE GYNECOLOGICAL EXAMINATION: ICD-10-CM

## 2020-01-16 PROCEDURE — 77063 MAMMO DIGITAL SCREENING BILAT WITH TOMOSYNTHESIS_CAD: ICD-10-PCS | Mod: 26,,, | Performed by: RADIOLOGY

## 2020-01-16 PROCEDURE — 77067 SCR MAMMO BI INCL CAD: CPT | Mod: TC

## 2020-01-16 PROCEDURE — 77067 SCR MAMMO BI INCL CAD: CPT | Mod: 26,,, | Performed by: RADIOLOGY

## 2020-01-16 PROCEDURE — 77063 BREAST TOMOSYNTHESIS BI: CPT | Mod: 26,,, | Performed by: RADIOLOGY

## 2020-01-16 PROCEDURE — 77067 MAMMO DIGITAL SCREENING BILAT WITH TOMOSYNTHESIS_CAD: ICD-10-PCS | Mod: 26,,, | Performed by: RADIOLOGY

## 2020-01-17 ENCOUNTER — TELEPHONE (OUTPATIENT)
Dept: OBSTETRICS AND GYNECOLOGY | Facility: CLINIC | Age: 50
End: 2020-01-17

## 2020-01-17 NOTE — TELEPHONE ENCOUNTER
----- Message from Nam Jones MD sent at 1/17/2020 12:44 PM CST -----  Inform the patient that her HIV test is negative.    Dr Jones

## 2020-01-17 NOTE — TELEPHONE ENCOUNTER
----- Message from Brenda Logan sent at 1/17/2020  2:23 PM CST -----  Contact: [phone]/self   Patient states she is returning your call regarding test results. Please advise.

## 2020-01-31 ENCOUNTER — OFFICE VISIT (OUTPATIENT)
Dept: PRIMARY CARE CLINIC | Facility: CLINIC | Age: 50
End: 2020-01-31
Payer: COMMERCIAL

## 2020-01-31 VITALS
OXYGEN SATURATION: 99 % | BODY MASS INDEX: 41.99 KG/M2 | HEART RATE: 80 BPM | DIASTOLIC BLOOD PRESSURE: 76 MMHG | TEMPERATURE: 98 F | SYSTOLIC BLOOD PRESSURE: 110 MMHG | WEIGHT: 228.19 LBS | HEIGHT: 62 IN

## 2020-01-31 DIAGNOSIS — F98.8 ATTENTION DEFICIT DISORDER (ADD) IN ADULT: Primary | ICD-10-CM

## 2020-01-31 DIAGNOSIS — F41.9 ANXIETY AND DEPRESSION: ICD-10-CM

## 2020-01-31 DIAGNOSIS — R19.5 MUCUS IN STOOL: ICD-10-CM

## 2020-01-31 DIAGNOSIS — R10.31 RIGHT INGUINAL PAIN: ICD-10-CM

## 2020-01-31 DIAGNOSIS — F32.A ANXIETY AND DEPRESSION: ICD-10-CM

## 2020-01-31 DIAGNOSIS — Z01.419 WELL WOMAN EXAM: ICD-10-CM

## 2020-01-31 PROCEDURE — 99999 PR PBB SHADOW E&M-EST. PATIENT-LVL IV: CPT | Mod: PBBFAC,,, | Performed by: FAMILY MEDICINE

## 2020-01-31 PROCEDURE — 3008F PR BODY MASS INDEX (BMI) DOCUMENTED: ICD-10-PCS | Mod: CPTII,S$GLB,, | Performed by: FAMILY MEDICINE

## 2020-01-31 PROCEDURE — 99204 OFFICE O/P NEW MOD 45 MIN: CPT | Mod: S$GLB,,, | Performed by: FAMILY MEDICINE

## 2020-01-31 PROCEDURE — 99204 PR OFFICE/OUTPT VISIT, NEW, LEVL IV, 45-59 MIN: ICD-10-PCS | Mod: S$GLB,,, | Performed by: FAMILY MEDICINE

## 2020-01-31 PROCEDURE — 99999 PR PBB SHADOW E&M-EST. PATIENT-LVL IV: ICD-10-PCS | Mod: PBBFAC,,, | Performed by: FAMILY MEDICINE

## 2020-01-31 PROCEDURE — 3008F BODY MASS INDEX DOCD: CPT | Mod: CPTII,S$GLB,, | Performed by: FAMILY MEDICINE

## 2020-01-31 RX ORDER — DEXTROAMPHETAMINE SACCHARATE, AMPHETAMINE ASPARTATE MONOHYDRATE, DEXTROAMPHETAMINE SULFATE AND AMPHETAMINE SULFATE 5; 5; 5; 5 MG/1; MG/1; MG/1; MG/1
20 CAPSULE, EXTENDED RELEASE ORAL EVERY MORNING
Qty: 30 CAPSULE | Refills: 0 | Status: SHIPPED | OUTPATIENT
Start: 2020-01-31 | End: 2020-03-06

## 2020-01-31 NOTE — PATIENT INSTRUCTIONS
Treating Attention Deficit/Hyperactivity Disorder (ADHD, ADD) in Adults  Attention deficit hyperactivity disorder (ADHD) starts in childhood. It may continue throughout your life. When it does, it may affect your job and even your relationships. Fortunately, with help, you can manage ADHD.     Treatment for ADD can help you achieve your goals.   Therapy  Your therapist can help you learn healthy ways to cope with ADHD. Sometimes, your partner or family may attend your sessions with you. This helps them understand more about your disorder.  Coaching  An ADHD  works with you to achieve your goals. Youll learn the best ways to manage your time. Youll also learn to avoid clutter and noise that may distract you. In time, your life will have more order and structure. And your  will provide support and feedback on your progress.  Work  Look for jobs where you can be free and creative. Avoid those that are dull or centered on details. You may still need to make a special effort. The following tips may help:  · Try to work at home, at least part-time.  · Ask for a private office.  · Use headphones to muffle noise.  · Work on more than one project at the same time. When you get bored with one, switch to the other.  · Work on boring tasks when you feel most alert.  · Have a schedule for each day.  · Ask your  or  to help with details.  · Use a day planner and to-do lists. Write yourself notes.  · Reward yourself when you finish a task.  Medicines  In some cases, medicines can help control symptoms of ADHD. Your healthcare provider may prescribe a stimulant to help you stay focused. Or you may take a type of antidepressant. It may take some time to find what works best for you. Keep in mind that medicines dont cure ADHD. And they may cause side effects such as headaches, trouble sleeping, or stomach problems. If youre bothered by side effects, be sure to tell your healthcare provider.  Changing the dose or type of medicine may help. Most often, youll use medicine along with therapy, coaching, and lifestyle changes.  Date Last Reviewed: 1/1/2017  © 9197-2432 [employer]. [address] [place] [address], [place] [address]. All rights reserved. This information is not intended as a substitute for professional medical care. Always follow your healthcare professional's instructions.

## 2020-01-31 NOTE — PROGRESS NOTES
"Subjective:       Patient ID: Kathrine Ashraf is a 49 y.o. female.    Chief Complaint: ADD; Ovary Pain (right); and Establish Care    50 yo female presents for concerns of inattention and chronic, intermittent right inguinal pain.    1. Inattention  She has always struggled to complete tasks. She got C's and F's in high school and did "ok" in College. She recalled that she would turn out when the teachers were talking or cut class because she didn't want to sit in for long periods of lectures. She would struggle to keep her house organized. She tunes out when coworkers are talking to her. She cuts people off during conversations and has to try really hard to maintain a conversation until she just tunes off and stops processing what is being said to her. At work she would get up and walk out of meetings. 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 While reading she would have to stop after 5 mins to take a walk or do another activity because she can't stay focused long enough to complete one task. She feels like her mind is all over the place. She took Adderall prescribed for her daughter in law because there was an important project which required completion at work. She felt like she was under intense scrutiny to perform well at work and took Adderall after reviewing her symptoms with the daughter in law who has ADD. She was congratulated after she performed well on the project (after taking Adderall). She has read about ADD symptoms and wanted to try because she could finally understand why she was struggling for her whole life.   She states that her mother passed away and there are issues with her daughter (does not divulge further details)- these events make her sad. She was prescribed Wellbutrin for grief/ depression but it was not helpful. A lot of her anxiety geared around performance at work since she wants to avoid probation or being fired for " underperformance and/or not being an asset to her team.    2. Right inguinal pain intermittently for 2 years.   Not unbearable.   On 2/5/2019 she had an Ultrasound which showed bilateral ovarian cysts.   Denies unintential weight loss. No ascites, or jaundice. No rash. No change in bowel movments. No blood. However she reports mucus in stool X 2 months. No family history of inflammatory bowel disease.  She had a hysterectomy.    The following portions of the patient's history were reviewed and updated as appropriate: allergies, current medications, past family history, past medical history, past social history, past surgical history and problem list.    Review of Systems   Constitutional: Positive for activity change, appetite change and fatigue. Negative for chills, diaphoresis, fever and unexpected weight change.   HENT: Negative for congestion, dental problem, facial swelling, nosebleeds, postnasal drip, rhinorrhea, sinus pain, sore throat, tinnitus and trouble swallowing.    Eyes: Negative for pain, discharge, itching and visual disturbance.   Respiratory: Negative for apnea, chest tightness, shortness of breath, wheezing and stridor.    Cardiovascular: Negative for chest pain, palpitations and leg swelling.   Gastrointestinal: Negative for abdominal distention, constipation, diarrhea, nausea, rectal pain and vomiting.   Endocrine: Negative for cold intolerance, heat intolerance and polyuria.   Genitourinary: Negative for difficulty urinating, dysuria, frequency, hematuria and urgency.   Musculoskeletal: Negative for arthralgias, gait problem, myalgias, neck pain and neck stiffness.   Skin: Negative for color change and rash.   Neurological: Negative for dizziness, tremors, seizures, syncope, facial asymmetry, weakness and headaches.   Hematological: Negative for adenopathy. Does not bruise/bleed easily.   Psychiatric/Behavioral: Positive for decreased concentration, dysphoric mood and sleep disturbance.  "Negative for agitation, confusion, hallucinations, self-injury and suicidal ideas. The patient is nervous/anxious. The patient is not hyperactive.        Objective:       Vitals:    01/31/20 1438   BP: 110/76   Pulse: 80   Temp: 98.2 °F (36.8 °C)   TempSrc: Oral   SpO2: 99%   Weight: 103.5 kg (228 lb 2.8 oz)   Height: 5' 2" (1.575 m)     Physical Exam   Constitutional: She is oriented to person, place, and time. She appears well-developed and well-nourished. No distress.   HENT:   Head: Normocephalic and atraumatic.   Right Ear: External ear normal.   Left Ear: External ear normal.   Mouth/Throat: No oropharyngeal exudate.   Eyes: Pupils are equal, round, and reactive to light. Conjunctivae and EOM are normal. Right eye exhibits no discharge. Left eye exhibits no discharge. No scleral icterus.   Neck: Normal range of motion. Neck supple. No thyromegaly present.   Cardiovascular: Normal rate, regular rhythm, normal heart sounds and intact distal pulses. Exam reveals no gallop and no friction rub.   No murmur heard.  Pulmonary/Chest: Effort normal and breath sounds normal. No respiratory distress. She exhibits no tenderness.   Abdominal: Soft. Bowel sounds are normal. She exhibits no distension and no mass. There is tenderness (bilateral inguinal region). There is no rebound and no guarding.   Musculoskeletal: Normal range of motion. She exhibits no edema.   Lymphadenopathy:     She has no cervical adenopathy.   Neurological: She is alert and oriented to person, place, and time. She displays normal reflexes. No cranial nerve deficit. She exhibits normal muscle tone. Coordination normal.   Skin: Skin is warm. Capillary refill takes less than 2 seconds. No rash noted. She is not diaphoretic.   Psychiatric: She has a normal mood and affect. Judgment and thought content normal.       2/5/2019  Prior hysterectomy. Small bilateral ovarian cysts.    Electronically Signed By: Stephanie Winkler MD 2/5/2019 3:03 PM CST   Result " Narrative     Transabdominal and transvaginal pelvic sonogram    [id number], [id number]    Multiple transabdominal and endovaginal sonographic images of the pelvis were provided.     INDICATION: Z 12.39. Pelvic pain. Prior hysterectomy.    COMPARISON: Noncontrast pelvic CT dated September 10, 2008    FINDINGS:   The vaginal cuff is unremarkable. The right ovary measures 2.0 x 1.3 x 1.6 cm and contains a 1.3 x 1.1 x 1.1 cm cyst. The left ovary measures 2.8 x 2.2 x 1.7 cm and contains a 1.2 x 1.0 x 1.1 cm cyst. Color Doppler evaluation demonstrates flow to each ovary. No free fluid. The bladder is grossly unremarkable.   Other Result Information   Virgilio, Rad Results In - 02/05/2019  3:05 PM CST    Transabdominal and transvaginal pelvic sonogram    [id number], [id number]    Multiple transabdominal and endovaginal sonographic images of the pelvis were provided.     INDICATION: Z 12.39. Pelvic pain. Prior hysterectomy.    COMPARISON: Noncontrast pelvic CT dated September 10, 2008    FINDINGS:   The vaginal cuff is unremarkable. The right ovary measures 2.0 x 1.3 x 1.6 cm and contains a 1.3 x 1.1 x 1.1 cm cyst. The left ovary measures 2.8 x 2.2 x 1.7 cm and contains a 1.2 x 1.0 x 1.1 cm cyst. Color Doppler evaluation demonstrates flow to each ovary. No free fluid. The bladder is grossly unremarkable.    IMPRESSION:    Prior hysterectomy. Small bilateral ovarian cysts.    Electronically Signed By: Stephanie Winkler MD 2/5/2019 3:03 PM CST     Assessment:       1. Attention deficit disorder (ADD) in adult    2. Anxiety and depression    3. Mucus in stool    4. Right inguinal pain    5. Well woman exam          Plan:         1. Attention deficit disorder (ADD) in adult  -     dextroamphetamine-amphetamine (ADDERALL XR) 20 MG 24 hr capsule; Take 1 capsule (20 mg total) by mouth every morning.  Dispense: 30 capsule; Refill: 0  Patient has a history of attention deficit disorder without hyperactivity. We have reviewed the use of medications  (stimulants and nonstimulants) for this in detail. Patient meets the diagnostic criteria of attention deficit disorder with or without hyperactivity per the American Psychiatric  Association Diagnostic and Statistical Manual of Mental Disorders. We have discussed the use of medications in detail including the reasonably likely side effects and complications. Patient is aware of the risks of palpitations, chest pain, weight loss, sweating, over stimulation and tics. We discussed the use and prescription for controlled medications and the fact that we need to see the patient approximately every 3 to 6 months once on an established dosage. Patient should not increase the medication without consulting with me first. NO SHARING OF MEDICATION, as her daughter in law has done with her. We have also discussed medication contracts and the fact that we do not replace lost or stolen medications.  Random urine drug screens will be performed. The patient is aware and will notify us if any problems.    Patient should enact specific systems and strategies that provide structure and lessen interference on tasks and concentration, including, but not limited to:  Identify specific space that minimizes distraction; break assignment into smaller tasks, when performing work and responsibilities take short, frequent breaks (about every 30 min take 3-4 minute break).  Use of supportive structures, schedules, clocks, alarms could be helpful when completing tasks.     2. Anxiety and depression  She has tried Wellbutrin in the past without success. A lot of her anxiety is centered around poor performance at her job due to inattention. Some of her depression is due to her inability to be an asset to her team coupled with relationship stressors with daughter.    3. Mucus in stool  Patient plans to change her insurance soon and will like to defer work up. Recommended Labs: CBC, TSH, CMP, Celiac, fecal calprotectin, CRP; CT Abdomen/ Pelvis with  contrast +/- scoping.    4. Right inguinal pain, chronic  Reviewed US finding: no alarming findings. She will follow up with Gynecology and pursue the recommended tests once new insurance kicks in.  Tylenol/Ibuprofen prn, cold/heat packs.    5. Well woman exam  -     Ambulatory referral to Gynecology    This was a 50 min visit, 40 minutes of which were spent counseling and coordinating care.    Return in 1 month regarding ADD    Disclaimer: This note has been generated using voice-recognition software. There may be typographical errors that have been missed during proof-reading

## 2020-02-14 ENCOUNTER — PATIENT OUTREACH (OUTPATIENT)
Dept: ADMINISTRATIVE | Facility: HOSPITAL | Age: 50
End: 2020-02-14

## 2020-02-14 NOTE — PROGRESS NOTES
Pre-visit chart review completed.  Immunizations reviewed and updated.    Patient due for the following    TETANUS VACCINE     Lipid Panel     Influenza Vaccine (1)

## 2020-02-27 ENCOUNTER — TELEPHONE (OUTPATIENT)
Dept: PRIMARY CARE CLINIC | Facility: CLINIC | Age: 50
End: 2020-02-27

## 2020-02-27 NOTE — TELEPHONE ENCOUNTER
----- Message from Charity Carey sent at 2/27/2020 11:19 AM CST -----  Contact: Self   Patient unable to come in on  02/28 so she rescheduled to  03/04 due to insurance not kicking in until  03/01/2020.

## 2020-03-06 ENCOUNTER — OFFICE VISIT (OUTPATIENT)
Dept: PRIMARY CARE CLINIC | Facility: CLINIC | Age: 50
End: 2020-03-06
Payer: COMMERCIAL

## 2020-03-06 VITALS
DIASTOLIC BLOOD PRESSURE: 86 MMHG | WEIGHT: 226.19 LBS | BODY MASS INDEX: 41.62 KG/M2 | OXYGEN SATURATION: 100 % | SYSTOLIC BLOOD PRESSURE: 120 MMHG | HEART RATE: 86 BPM | TEMPERATURE: 98 F | HEIGHT: 62 IN

## 2020-03-06 DIAGNOSIS — F98.8 ATTENTION DEFICIT DISORDER (ADD) IN ADULT: Primary | ICD-10-CM

## 2020-03-06 PROCEDURE — 99213 PR OFFICE/OUTPT VISIT, EST, LEVL III, 20-29 MIN: ICD-10-PCS | Mod: S$GLB,,, | Performed by: FAMILY MEDICINE

## 2020-03-06 PROCEDURE — 99999 PR PBB SHADOW E&M-EST. PATIENT-LVL III: ICD-10-PCS | Mod: PBBFAC,,, | Performed by: FAMILY MEDICINE

## 2020-03-06 PROCEDURE — 3008F BODY MASS INDEX DOCD: CPT | Mod: CPTII,S$GLB,, | Performed by: FAMILY MEDICINE

## 2020-03-06 PROCEDURE — 99213 OFFICE O/P EST LOW 20 MIN: CPT | Mod: S$GLB,,, | Performed by: FAMILY MEDICINE

## 2020-03-06 PROCEDURE — 3008F PR BODY MASS INDEX (BMI) DOCUMENTED: ICD-10-PCS | Mod: CPTII,S$GLB,, | Performed by: FAMILY MEDICINE

## 2020-03-06 PROCEDURE — 99999 PR PBB SHADOW E&M-EST. PATIENT-LVL III: CPT | Mod: PBBFAC,,, | Performed by: FAMILY MEDICINE

## 2020-03-06 RX ORDER — DEXTROAMPHETAMINE SACCHARATE, AMPHETAMINE ASPARTATE, DEXTROAMPHETAMINE SULFATE AND AMPHETAMINE SULFATE 5; 5; 5; 5 MG/1; MG/1; MG/1; MG/1
1 TABLET ORAL DAILY
Qty: 30 TABLET | Refills: 0 | Status: SHIPPED | OUTPATIENT
Start: 2020-03-06 | End: 2020-04-24 | Stop reason: SDUPTHER

## 2020-03-06 NOTE — PROGRESS NOTES
Subjective:       Patient ID: Kathrnie Ashraf is a 50 y.o. female.    Chief Complaint: ADD (FOLLOW UP)    49 yo female with ADD presents for follow up.    She has dry mouth with Adderall XR and felt like the immediate release Adderall was better for her. Otherwise no medication side effects. She reports improvement in functioning while at work. She is able to maintain her focus and complete her tasks without getting easily districted. Making less mistakes and more efficient.    The following portions of the patient's history were reviewed and updated as appropriate: allergies, current medications, past medical history, and problem list.    Review of Systems   Constitutional: Negative for activity change, appetite change, chills, diaphoresis, fatigue, fever and unexpected weight change.   HENT: Negative for congestion, dental problem, facial swelling, nosebleeds, postnasal drip, rhinorrhea, sinus pain, sore throat, tinnitus and trouble swallowing.    Eyes: Negative for pain, discharge, itching and visual disturbance.   Respiratory: Negative for apnea, chest tightness, shortness of breath, wheezing and stridor.    Cardiovascular: Negative for chest pain, palpitations and leg swelling.   Gastrointestinal: Negative for abdominal distention, abdominal pain, constipation, diarrhea, nausea, rectal pain and vomiting.   Endocrine: Negative for cold intolerance, heat intolerance and polyuria.   Genitourinary: Negative for difficulty urinating, dysuria, frequency, hematuria and urgency.   Musculoskeletal: Negative for arthralgias, gait problem, myalgias, neck pain and neck stiffness.   Skin: Negative for color change and rash.   Neurological: Negative for dizziness, tremors, seizures, syncope, facial asymmetry, weakness and headaches.   Hematological: Negative for adenopathy. Does not bruise/bleed easily.   Psychiatric/Behavioral: Positive for decreased concentration. Negative for agitation, confusion, hallucinations,  "self-injury and suicidal ideas. The patient is not hyperactive.        Objective:       Vitals:    03/06/20 1005   BP: 120/86   Pulse: 86   Temp: 98.1 °F (36.7 °C)   TempSrc: Oral   SpO2: 100%   Weight: 102.6 kg (226 lb 3.1 oz)   Height: 5' 2" (1.575 m)     Physical Exam   Constitutional: She is oriented to person, place, and time. She appears well-developed and well-nourished. No distress.   HENT:   Head: Atraumatic.   Eyes: Conjunctivae are normal.   Neck: Neck supple.   Cardiovascular: Normal rate, regular rhythm, normal heart sounds and intact distal pulses.   Pulmonary/Chest: Effort normal and breath sounds normal.   Abdominal: Soft. Bowel sounds are normal.   Neurological: She is alert and oriented to person, place, and time. She displays normal reflexes. No cranial nerve deficit or sensory deficit. She exhibits normal muscle tone. Coordination normal.   Skin: No rash noted.   Psychiatric: She has a normal mood and affect. Judgment and thought content normal.       Assessment:       1. Attention deficit disorder (ADD) in adult        Plan:       Attention deficit disorder (ADD) in adult  -     dextroamphetamine-amphetamine (ADDERALL) 20 mg tablet; Take 1 tablet by mouth once daily.  Dispense: 30 tablet; Refill: 0  She does not use the mediation on her non-work days; her work schedule varies. She will call in once she is down to 7 pills for medication refill  Patient has a history of attention deficit disorder. We have reviewed the use of medications for this in detail. Patient meets the diagnostic criteria of attention deficit disorder per the American Psychiatric  Association Diagnostic and Statistical Manual of Mental Disorders. We have discussed the use of medications in detail including the reasonably likely side effects and complications. Patient is aware of the risks of palpitations, chest pain, weight loss, sweating, over stimulation and tics and appears to be doing well with the medicine. We have " switched from XR to immediate release preparation. We discussed the use and prescription for controlled medications and the fact that we need to see the patient approximately every 6 months. We have also discussed the fact that we do not replace lost or stolen medications.  Random urine drug screens will be performed. The patient is aware and will notify us if any problems.    Patient should enact specific systems and strategies that provide structure and lessen interference on tasks and concentration, including, but not limited to:  Identify specific space that minimizes distraction; break assignment into smaller tasks, when performing work and responsibilities take short, frequent breaks (about every 30 min take 3-4 minute break).  Use of supportive structures, schedules, clocks, alarms could be helpful when completing tasks.    Patient to make an appointment in September, 2020    Disclaimer: This note has been generated using voice-recognition software. There may be typographical errors that have been missed during proof-reading

## 2020-04-24 DIAGNOSIS — F98.8 ATTENTION DEFICIT DISORDER (ADD) IN ADULT: ICD-10-CM

## 2020-04-24 RX ORDER — DEXTROAMPHETAMINE SACCHARATE, AMPHETAMINE ASPARTATE, DEXTROAMPHETAMINE SULFATE AND AMPHETAMINE SULFATE 5; 5; 5; 5 MG/1; MG/1; MG/1; MG/1
1 TABLET ORAL DAILY
Qty: 30 TABLET | Refills: 0 | Status: SHIPPED | OUTPATIENT
Start: 2020-04-24 | End: 2021-03-30

## 2020-04-24 NOTE — TELEPHONE ENCOUNTER
----- Message from Deisi Martin sent at 4/24/2020  1:00 PM CDT -----  Contact: patient [phone]  Requesting an RX refill or new RX.  Is this a refill or new RX:  refill  RX name and strength: dextroamphetamine-amphetamine (ADDERALL) 20 mg tablet  Directions (copy/paste from chart):  Take 1 tablet by mouth once daily. - Oral  Is this a 30 day or 90 day RX:  30  Local pharmacy or mail order pharmacy:  local  Pharmacy name and phone # [id number] - [place], [place] - [address] AT [facility] [address] [phone] (Phon   Comments:

## 2020-04-27 ENCOUNTER — TELEPHONE (OUTPATIENT)
Dept: PRIMARY CARE CLINIC | Facility: CLINIC | Age: 50
End: 2020-04-27

## 2020-04-27 NOTE — TELEPHONE ENCOUNTER
----- Message from Marielena Chun sent at 4/27/2020  9:27 AM CDT -----  Contact: Pt [phone]  The pharmacy needs a PA for the refill of the dextroamphetamine-amphetamine (ADDERALL) 20 mg tablet.    [id number] [place], [place] - [address] AT [facility] [address] [phone] (Phone) [phone] (Fax)    Thank you

## 2020-07-15 ENCOUNTER — TELEPHONE (OUTPATIENT)
Dept: PRIMARY CARE CLINIC | Facility: CLINIC | Age: 50
End: 2020-07-15

## 2020-07-15 ENCOUNTER — TELEPHONE (OUTPATIENT)
Dept: NEUROLOGY | Facility: CLINIC | Age: 50
End: 2020-07-15

## 2020-07-15 ENCOUNTER — OFFICE VISIT (OUTPATIENT)
Dept: PRIMARY CARE CLINIC | Facility: CLINIC | Age: 50
End: 2020-07-15
Payer: COMMERCIAL

## 2020-07-15 VITALS
OXYGEN SATURATION: 98 % | HEIGHT: 62 IN | HEART RATE: 101 BPM | SYSTOLIC BLOOD PRESSURE: 108 MMHG | DIASTOLIC BLOOD PRESSURE: 80 MMHG | TEMPERATURE: 97 F | WEIGHT: 217.81 LBS | BODY MASS INDEX: 40.08 KG/M2

## 2020-07-15 DIAGNOSIS — R20.2 NUMBNESS AND TINGLING: ICD-10-CM

## 2020-07-15 DIAGNOSIS — R06.02 SHORTNESS OF BREATH: Primary | ICD-10-CM

## 2020-07-15 DIAGNOSIS — R19.5 DARK STOOLS: ICD-10-CM

## 2020-07-15 DIAGNOSIS — R19.5 MUCUS IN STOOL: ICD-10-CM

## 2020-07-15 DIAGNOSIS — R20.0 NUMBNESS AND TINGLING: ICD-10-CM

## 2020-07-15 DIAGNOSIS — R41.3 SHORT-TERM MEMORY LOSS: ICD-10-CM

## 2020-07-15 DIAGNOSIS — E66.01 CLASS 2 SEVERE OBESITY DUE TO EXCESS CALORIES WITH SERIOUS COMORBIDITY AND BODY MASS INDEX (BMI) OF 39.0 TO 39.9 IN ADULT: ICD-10-CM

## 2020-07-15 DIAGNOSIS — K21.9 GASTROESOPHAGEAL REFLUX DISEASE WITHOUT ESOPHAGITIS: Primary | ICD-10-CM

## 2020-07-15 PROCEDURE — 93010 EKG 12-LEAD: ICD-10-PCS | Mod: S$GLB,,, | Performed by: INTERNAL MEDICINE

## 2020-07-15 PROCEDURE — 93010 ELECTROCARDIOGRAM REPORT: CPT | Mod: S$GLB,,, | Performed by: INTERNAL MEDICINE

## 2020-07-15 PROCEDURE — 99214 OFFICE O/P EST MOD 30 MIN: CPT | Mod: S$GLB,,, | Performed by: FAMILY MEDICINE

## 2020-07-15 PROCEDURE — 3008F BODY MASS INDEX DOCD: CPT | Mod: CPTII,S$GLB,, | Performed by: FAMILY MEDICINE

## 2020-07-15 PROCEDURE — 99999 PR PBB SHADOW E&M-EST. PATIENT-LVL V: CPT | Mod: PBBFAC,,, | Performed by: FAMILY MEDICINE

## 2020-07-15 PROCEDURE — 93005 EKG 12-LEAD: ICD-10-PCS | Mod: S$GLB,,, | Performed by: FAMILY MEDICINE

## 2020-07-15 PROCEDURE — 99999 PR PBB SHADOW E&M-EST. PATIENT-LVL V: ICD-10-PCS | Mod: PBBFAC,,, | Performed by: FAMILY MEDICINE

## 2020-07-15 PROCEDURE — 99214 PR OFFICE/OUTPT VISIT, EST, LEVL IV, 30-39 MIN: ICD-10-PCS | Mod: S$GLB,,, | Performed by: FAMILY MEDICINE

## 2020-07-15 PROCEDURE — 93005 ELECTROCARDIOGRAM TRACING: CPT | Mod: S$GLB,,, | Performed by: FAMILY MEDICINE

## 2020-07-15 PROCEDURE — 3008F PR BODY MASS INDEX (BMI) DOCUMENTED: ICD-10-PCS | Mod: CPTII,S$GLB,, | Performed by: FAMILY MEDICINE

## 2020-07-15 RX ORDER — ALBUTEROL SULFATE 90 UG/1
2 AEROSOL, METERED RESPIRATORY (INHALATION) EVERY 6 HOURS PRN
Qty: 18 G | Refills: 0 | Status: SHIPPED | OUTPATIENT
Start: 2020-07-15 | End: 2021-03-30

## 2020-07-15 RX ORDER — FLUTICASONE FUROATE AND VILANTEROL 200; 25 UG/1; UG/1
1 POWDER RESPIRATORY (INHALATION) DAILY
Qty: 60 EACH | Refills: 0 | Status: SHIPPED | OUTPATIENT
Start: 2020-07-15 | End: 2021-03-30

## 2020-07-15 RX ORDER — PANTOPRAZOLE SODIUM 40 MG/1
40 TABLET, DELAYED RELEASE ORAL DAILY
Qty: 90 TABLET | Refills: 3 | Status: SHIPPED | OUTPATIENT
Start: 2020-07-15 | End: 2021-03-30

## 2020-07-15 RX ORDER — OMEPRAZOLE 40 MG/1
40 CAPSULE, DELAYED RELEASE ORAL DAILY
Qty: 90 CAPSULE | Refills: 0 | Status: SHIPPED | OUTPATIENT
Start: 2020-07-15 | End: 2020-07-15

## 2020-07-15 NOTE — PATIENT INSTRUCTIONS
Tension Headache    A muscle tension headache is a very common cause of head pain. Its also called a stress headache. When some people are under stress, they tense the muscles of their shoulder, neck, and scalp without knowing it. If this tension lasts long enough, a headache can occur. A tension headache can be quite painful. It can last for hours or even days.  Home care  Follow these tips when caring for yourself at home:  · Dont drive yourself home if you were given pain medicine for your headache. Instead, have someone else drive you home. Try to sleep when you get home. You should feel much better when you wake up.  · Put heat on the back of your neck to help ease neck spasm.  · Drink only clear liquids or eat a light diet until your symptoms get better. This will help you avoid nausea or vomiting.  How to prevent headaches  · Figure out what is causing stress in your life. Learn new ways to handle your stress. Ideas include regular exercise, biofeedback, self-hypnosis, yoga, and meditation. Talk with your healthcare provider to find out more information about managing stress. Many books and digital media are also available on this subject.  · Take time out at the first sign of a tension headache, if possible. Take yourself out of the stressful situation. Find a quiet, comfortable place to sit or lie down and let yourself relax. Heat and deep massage of the tight areas in the neck and shoulders may help ease muscle spasm. You may also get relief from a medicine like ibuprofen or a prescribed muscle relaxant.  Follow-up care  Follow up with your healthcare provider, or as advised. Talk with your provider if you have frequent headaches. He or she can figure out a treatment plan. Ask if you can have medicine to take at home the next time you get a bad headache. This may keep you from having to visit the emergency department in the future. You may need to see a headache specialist (neurologist) if you continue  to have headaches.  When to seek medical advice  Call your healthcare provider right away if any of these occur:  · Your head pain gets worse during sexual intercourse or strenuous activity  · Your head pain doesnt get better within 24 hours  · You arent able to keep liquids down (repeated vomiting)  · Fever of 100.4ºF (38ºC) or higher, or as directed by your healthcare provider  · Stiff neck  · Extreme drowsiness, confusion, or fainting  · Dizziness or dizziness with spinning sensation (vertigo)  · Weakness in an arm or leg or one side of your face  · You have difficulty speaking  · Your vision changes  Date Last Reviewed: 8/1/2016  © 5718-0590 [employer]. [address] [place] [address], [place] [address]. All rights reserved. This information is not intended as a substitute for professional medical care. Always follow your healthcare professional's instructions.

## 2020-07-15 NOTE — PROGRESS NOTES
Subjective:       Patient ID: Kathrine Ashraf is a 50 y.o. female.    Chief Complaint: Shortness of Breath, Wheezing, Memory loss, Heartburn, and Melena    49 yo female states that she was in her usual state of health until 1 month ago when she started working out. She states that she would usually never fel out of breath but has been having a hard time recovering from exercise. She hears audible wheezing. She was told that she had adult onset asthma but never took medication prescribed. She cannot remember if she was prescribed an inhaler or pills. She states that her memory is not good. Mostly has trouble with short term memory; forgets where she enriquez her care. Does not forget people or places. She reports that for 1 months she has numbness in hands, toes and legs which is getting worse. No calf pain or swelling. No persistent chest pain. She has reflux and is not using Famotidine. She states that her stool is dark and has mucus. The dark appearance of stool was only noticed yesterday but she has mucus in her stool for at least 1 year. Sh has nocturnal, frontal headaches for 1 month at night/ late evening on a daily basis which is alleviated by tylenol and not associated with nausea/ vomiting, phonophobia or photophobia.    The following portions of the patient's history were reviewed and updated as appropriate: allergies, current medications, past family history, past medical history, past social history, past surgical history and problem list.      Review of Systems   Constitutional: Negative for activity change, appetite change, chills, diaphoresis, fatigue, fever and unexpected weight change.   HENT: Negative for nasal congestion, dental problem, facial swelling, nosebleeds, postnasal drip, rhinorrhea, sore throat, tinnitus and trouble swallowing.    Eyes: Negative for pain, discharge, itching and visual disturbance.   Respiratory: Positive for shortness of breath and wheezing. Negative for apnea, cough,  "choking, chest tightness and stridor.    Cardiovascular: Negative for chest pain, palpitations and leg swelling.   Gastrointestinal: Positive for constipation, diarrhea and reflux. Negative for abdominal distention, abdominal pain, nausea, rectal pain and vomiting.   Endocrine: Negative for cold intolerance, heat intolerance and polyuria.   Genitourinary: Negative for difficulty urinating, dysuria, frequency, hematuria and urgency.   Musculoskeletal: Negative for arthralgias, gait problem, myalgias, neck pain and neck stiffness.   Integumentary:  Negative for color change and rash.   Neurological: Positive for numbness, headaches and memory loss. Negative for dizziness, tremors, seizures, syncope, facial asymmetry and weakness.   Hematological: Negative for adenopathy. Does not bruise/bleed easily.   Psychiatric/Behavioral: Negative for agitation, confusion, hallucinations, self-injury and suicidal ideas. The patient is not hyperactive.          Objective:       Vitals:    07/15/20 1339   BP: 108/80   Pulse: 101   Temp: 97.3 °F (36.3 °C)   TempSrc: Temporal   SpO2: 98%   Weight: 98.8 kg (217 lb 13 oz)   Height: 5' 2" (1.575 m)     Physical Exam  Constitutional:       General: She is not in acute distress.     Appearance: She is well-developed. She is not diaphoretic.   HENT:      Head: Normocephalic and atraumatic.      Right Ear: External ear normal.      Left Ear: External ear normal.   Eyes:      General: No scleral icterus.        Right eye: No discharge.         Left eye: No discharge.      Conjunctiva/sclera: Conjunctivae normal.      Pupils: Pupils are equal, round, and reactive to light.   Neck:      Musculoskeletal: Normal range of motion and neck supple.      Thyroid: No thyromegaly.   Cardiovascular:      Rate and Rhythm: Normal rate and regular rhythm.      Heart sounds: Normal heart sounds. No murmur. No friction rub. No gallop.    Pulmonary:      Effort: Pulmonary effort is normal. No respiratory " distress.      Breath sounds: Normal breath sounds.   Chest:      Chest wall: No tenderness.   Abdominal:      General: Bowel sounds are normal. There is distension (obese).      Palpations: Abdomen is soft. There is no mass.      Tenderness: There is no abdominal tenderness. There is no guarding or rebound.   Musculoskeletal: Normal range of motion.   Lymphadenopathy:      Cervical: No cervical adenopathy.   Skin:     General: Skin is warm.      Capillary Refill: Capillary refill takes less than 2 seconds.      Findings: No rash.   Neurological:      Mental Status: She is alert and oriented to person, place, and time.      Cranial Nerves: No cranial nerve deficit.      Sensory: No sensory deficit (no sensory deficit by monofilament testing).      Motor: No abnormal muscle tone.      Coordination: Coordination normal.      Deep Tendon Reflexes: Reflexes normal.   Psychiatric:         Thought Content: Thought content normal.         Judgment: Judgment normal.         Assessment:       1. Shortness of breath    2. Numbness and tingling    3. Mucus in stool    4. Class 2 severe obesity due to excess calories with serious comorbidity and body mass index (BMI) of 39.0 to 39.9 in adult    5. Dark stools    6. Short-term memory loss        Plan:       1. Shortness of breath  -     CBC auto differential; Future  -     X-Ray Chest 1 View; Future; Expected date: 07/15/2020  -     IN OFFICE EKG 12-LEAD (to [place]):  I have independently reviewed and interpreted her EKG which shows a heart rate of 70 beats per minute, normal sinus rhythm, no acute ischemic changes  -     D dimer, quantitative; Future  -     Exercise Stress - EKG; Future  -     albuterol (VENTOLIN HFA) 90 mcg/actuation inhaler; Inhale 2 puffs into the lungs every 6 (six) hours as needed for Wheezing or Shortness of Breath. Rescue  Dispense: 18 g; Refill: 0  -     fluticasone furoate-vilanteroL (BREO) 200-25 mcg/dose DsDv diskus inhaler; Inhale 1 puff into the  lungs once daily. Controller  Dispense: 60 each; Refill: 0  -     COVID-19 (SARS CoV-2) IgG Antibody; Future    If no improvement with do COVID nasal swab, spirometry and get pulmonology referral    2. Numbness and tingling  -     Hemoglobin A1C; Future  -     Comprehensive metabolic panel; Future  -     Vitamin B12; Future  -     TSH; Future  -     HIV 1/2 Ag/Ab (4th Gen); Future  If no improvement will refer to Neurology    3. Mucus in stool  -     H. pylori antigen, stool; Future  -     Calprotectin; Future  -     C-reactive protein; Future  -     Sedimentation rate; Future  -     Ambulatory referral/consult to Gastroenterology; Future; Expected date: 07/22/2020    4. Class 2 severe obesity due to excess calories with serious comorbidity and body mass index (BMI) of 39.0 to 39.9 in adult  -     Lipid Panel; Future  The importance of optimum diet & exercise discussed.      The consumption of a reduced calorie diet, frequent self-weighing, and participation in a lifestyle intervention program are strategies to help maintain weight loss.     5. Dark stools  -     Occult blood x 1, stool; Future  -     Occult blood x 1, stool; Future  -     Occult blood x 1, stool; Future  -     H. pylori antigen, stool; Future  -    Omeprazole not affordable. Pantoprazole prescribed.      6. Short-term memory loss  Differentials include inattention, intellectual disability, mood disorder, early onset dementia  -     Ambulatory referral/consult to Neuropsychology; Future; Expected date: 07/22/2020  -     RPR; Future  TSH and Vitamin B 12 ordered.    Disclaimer: This note has been generated using voice-recognition software. There may be typographical errors that have been missed during proof-reading

## 2020-07-15 NOTE — TELEPHONE ENCOUNTER
----- Message from Meera Grimm sent at 7/15/2020  3:22 PM CDT -----  Contact: Kathrine [phone]  Would like to receive medical advice.    Would they like a call back or a response via [facility]:  Call back     Additional information:  Calling to speak to the nurse pt states she is at the pharmacy and her medication needs a PA.

## 2020-07-16 ENCOUNTER — LAB VISIT (OUTPATIENT)
Dept: LAB | Facility: HOSPITAL | Age: 50
End: 2020-07-16
Attending: FAMILY MEDICINE
Payer: COMMERCIAL

## 2020-07-16 DIAGNOSIS — R19.5 MUCUS IN STOOL: ICD-10-CM

## 2020-07-16 DIAGNOSIS — R19.5 DARK STOOLS: ICD-10-CM

## 2020-07-16 PROCEDURE — 87338 HPYLORI STOOL AG IA: CPT

## 2020-07-16 PROCEDURE — 83993 ASSAY FOR CALPROTECTIN FECAL: CPT

## 2020-07-16 PROCEDURE — 82272 OCCULT BLD FECES 1-3 TESTS: CPT

## 2020-07-17 ENCOUNTER — HOSPITAL ENCOUNTER (OUTPATIENT)
Dept: RADIOLOGY | Facility: HOSPITAL | Age: 50
Discharge: HOME | End: 2020-07-17
Attending: FAMILY MEDICINE
Payer: COMMERCIAL

## 2020-07-17 ENCOUNTER — TELEPHONE (OUTPATIENT)
Dept: PRIMARY CARE CLINIC | Facility: CLINIC | Age: 50
End: 2020-07-17

## 2020-07-17 ENCOUNTER — LAB VISIT (OUTPATIENT)
Dept: LAB | Facility: HOSPITAL | Age: 50
End: 2020-07-17
Payer: COMMERCIAL

## 2020-07-17 ENCOUNTER — PATIENT OUTREACH (OUTPATIENT)
Dept: ADMINISTRATIVE | Facility: OTHER | Age: 50
End: 2020-07-17

## 2020-07-17 DIAGNOSIS — R19.5 DARK STOOLS: ICD-10-CM

## 2020-07-17 DIAGNOSIS — R06.02 SHORTNESS OF BREATH: ICD-10-CM

## 2020-07-17 DIAGNOSIS — R19.5 DARK STOOLS: Primary | ICD-10-CM

## 2020-07-17 LAB — OB PNL STL: NEGATIVE

## 2020-07-17 PROCEDURE — 71046 X-RAY EXAM CHEST 2 VIEWS: CPT | Mod: TC,PN

## 2020-07-17 PROCEDURE — 82272 OCCULT BLD FECES 1-3 TESTS: CPT

## 2020-07-17 PROCEDURE — 71046 X-RAY EXAM CHEST 2 VIEWS: CPT | Mod: 26,,, | Performed by: RADIOLOGY

## 2020-07-17 PROCEDURE — 71046 XR CHEST PA AND LATERAL: ICD-10-PCS | Mod: 26,,, | Performed by: RADIOLOGY

## 2020-07-17 NOTE — PROGRESS NOTES
Requested updates within Care Everywhere.  Patient's chart was reviewed for overdue JAYCOB topics.  Immunizations reconciled.    Orders placed:  Tasked appts:  Labs Linked:

## 2020-07-18 LAB — OB PNL STL: NEGATIVE

## 2020-07-20 ENCOUNTER — TELEPHONE (OUTPATIENT)
Dept: PRIMARY CARE CLINIC | Facility: CLINIC | Age: 50
End: 2020-07-20

## 2020-07-21 LAB — H PYLORI AG STL QL IA: NOT DETECTED

## 2020-07-24 LAB — CALPROTECTIN STL-MCNT: <27.1 MCG/G

## 2020-07-28 ENCOUNTER — OFFICE VISIT (OUTPATIENT)
Dept: GASTROENTEROLOGY | Facility: CLINIC | Age: 50
End: 2020-07-28
Payer: COMMERCIAL

## 2020-07-28 ENCOUNTER — PATIENT OUTREACH (OUTPATIENT)
Dept: ADMINISTRATIVE | Facility: OTHER | Age: 50
End: 2020-07-28

## 2020-07-28 VITALS — BODY MASS INDEX: 40.57 KG/M2 | HEIGHT: 62 IN | WEIGHT: 220.44 LBS

## 2020-07-28 DIAGNOSIS — R19.5 MUCUS IN STOOL: ICD-10-CM

## 2020-07-28 DIAGNOSIS — Z12.11 SCREEN FOR COLON CANCER: Primary | ICD-10-CM

## 2020-07-28 PROCEDURE — 99204 OFFICE O/P NEW MOD 45 MIN: CPT | Mod: S$GLB,,, | Performed by: INTERNAL MEDICINE

## 2020-07-28 PROCEDURE — 99999 PR PBB SHADOW E&M-EST. PATIENT-LVL III: CPT | Mod: PBBFAC,,, | Performed by: INTERNAL MEDICINE

## 2020-07-28 PROCEDURE — 99204 PR OFFICE/OUTPT VISIT, NEW, LEVL IV, 45-59 MIN: ICD-10-PCS | Mod: S$GLB,,, | Performed by: INTERNAL MEDICINE

## 2020-07-28 PROCEDURE — 99999 PR PBB SHADOW E&M-EST. PATIENT-LVL III: ICD-10-PCS | Mod: PBBFAC,,, | Performed by: INTERNAL MEDICINE

## 2020-07-28 RX ORDER — POLYETHYLENE GLYCOL 3350, SODIUM SULFATE ANHYDROUS, SODIUM BICARBONATE, SODIUM CHLORIDE, POTASSIUM CHLORIDE 236; 22.74; 6.74; 5.86; 2.97 G/4L; G/4L; G/4L; G/4L; G/4L
4 POWDER, FOR SOLUTION ORAL ONCE
Qty: 1 BOTTLE | Refills: 0 | Status: SHIPPED | OUTPATIENT
Start: 2020-07-28 | End: 2020-07-28

## 2020-07-28 NOTE — PATIENT INSTRUCTIONS
GOLYTELY/ COLYTE/ NULYTELY Instructions    You are scheduled for a colonoscopy with Dr. Smith on               at [facility] located at [address] [place] [address].  Check in at the admit desk, first floor of the hospital (which is the building on the left).     You will receive a call 2-3 days before your colonoscopy to tell you the time to arrive.  If you have not received a call by the day before your procedure, call the Endoscopy Lab at [phone].    To ensure that your test is accurate and complete, you MUST follow these instructions listed below.  If you have any questions, please call our office at [phone].  Plan on being at the hospital for your procedure for 3-4 hours.    1.  Follow a CLEAR LIQUID DIET for the entire day before your scheduled colonoscopy.  This means no solid food the entire day starting when you wake.  You may have as much of the clear liquids as you want throughout the day.   CLEAR LIQUID DIET:   - Avoid Red, Orange, Purple, and/or Blue food coloring   - NO DAIRY   - You can have:  Coffee with sugar (no creamer), tea, water, soda, apple or white grape juice, chicken or beef broth/bouillon (no meat, noodles, or veggies), green/yellow popsicles, green/yellow Jell-O, lemonade.    2.  MIX GOLYTELY/COLYTE/NULYTELY (all names for same product) WITH ONE (1) GALLON OF WATER.  YOU MAY ADD A FLAVOR PACKET OR YELLOW/GREEN POWDER DRINK MIX TO THIS.  PUT IN REFRIGERATOR.  This is easier to drink if this solution is cold, so you can mix the solution one day ahead of time and place in the refrigerator prior to drinking.  You have to drink the solution within 24-36 hours of mixing it.  Do NOT put this solution over ice.  It IS ok to drink with a straw.    3. AT 5 PM THE DAY BEFORE YOUR COLONOSCOPY, DRINK ONE (1) 8 OUNCE GLASS OF MIXTURE EVERY 10 MINUTES UNTIL HALF OF THE GALLON IS CONSUMED.  Keep this mixture cold and in refrigerator as much as you can while drinking it.  Place the  remaining half of mixture in the refrigerator when you finish the first half.    4.  The endoscopy department will call you 2 days before your colonoscopy to tell you the exact time to arrive, AND to tell you the exact time to drink the 2nd portion of your prep (which will be FIVE HOURS BEFORE YOUR ARRIVAL TIME).  At this time given to you, DRINK ONE (1) 8 OUNCE GLASS OF MIXTURE EVERY 10 MINUTES UNTIL THE OTHER HALF IS CONSUMED. Keep the mixture cold while you are drinking it. Once this is complete, you may not have ANYTHING else by mouth!      5.  You must have someone with you to DRIVE YOU HOME since you will be receiving IV sedation for the colonoscopy.    6.  It is ok to take your heart, blood pressure, and seizure medications in the morning of your test with a SIP of water.  Hold other medications until after your procedure.  Do NOT have anything else to eat or drink the morning of your colonoscopy.  It is ok to brush your teeth.    7.  If you are on blood thinners THAT YOU HAVE BEEN INSTRUCTED TO HOLD BY YOUR DOCTOR FOR THIS PROCEDURE, then do NOT take this the morning of your colonoscopy.  Do NOT stop these medications on your own, they must be approved to be held by your doctor.  Your colonoscopy can NOT be done if you are on these medications.  Examples of blood thinners include: Coumadin, Aggrenox, Plavix, Pradaxa, Reapro, Pletal, Xarelto, Ticagrelor, Brilinta, Eliquis, and high dose aspirin (325 mg).  You do not have to stop baby aspirin 81 mg.    8.  IF YOU ARE DIABETIC:  NO INSULIN OR ORAL MEDICATIONS THE MORNING OF THE COLONOSCOPY.  TAKE ONLY HALF THE DOSE OF YOUR INSULIN THE DAY BEFORE THE COLONOSCOPY.  DO NOT TAKE ANY ORAL DIABETIC MEDICATIONS THE DAY BEFORE THE COLONOSCOPY.  IF YOU ARE AN INSULIN DEPENDENT DIABETIC WITH UNSTABLE BLOOD SUGARS, NOTIFY YOUR PRIMARY CARE PHYSICIAN FOR INSTRUCTIONS.

## 2020-07-28 NOTE — LETTER
July 28, 2020      Chela Rivas MD  [address] [facility] [address]           [place] [facility]  [address]  [facility] [address]  Phone: [phone]          Patient: Kathrine Ashraf   MR Number: [id number]   YOB: 1970   Date of Visit: 7/28/2020       Dear Dr. Chela Rivas:    Thank you for referring Kathrine Ashraf to me for evaluation. Attached you will find relevant portions of my assessment and plan of care.    If you have questions, please do not hesitate to call me. I look forward to following Kathrine Ashraf along with you.    Sincerely,    Shadi Smith MD    Enclosure  CC:  No Recipients    If you would like to receive this communication electronically, please contact [email or web address] or [phone] to request more information on [employer] Link access.    For providers and/or their staff who would like to refer a patient to [facility], please contact us through our one-stop-shop provider referral line, [facility], at [phone].    If you feel you have received this communication in error or would no longer like to receive these types of communications, please e-mail [email or web address]

## 2020-07-28 NOTE — PROGRESS NOTES
GASTROENTEROLOGY CLINIC NOTE    Reason for visit: The primary encounter diagnosis was Screen for colon cancer. A diagnosis of Mucus in stool was also pertinent to this visit.  Referring provider/PCP: Chela Rivas MD    HPI:  Kathrine Ashraf is a 50 y.o. female here today for mucus in stool. New patient.    She has noticed over the last few months a clear gel in her stool.  She describes this as mucus.  There is no blood in the stool though she mentions of maybe a little bit darker.  She was recently started on Protonix for chest discomfort and she feels like that is overall better and she is not noticing that anymore.  She is going to undergo a stress test but that is not scheduled yet.  I do recommend that she undergo full cardiac evaluation.  She has never had a colonoscopy.  There is no family history of colon cancer.  She does not see bright red blood in the stool.  She does have some lower abdominal cramping and discomfort and she thinks that is related to constipation.  She does not have regular bowel habits, she will go a few days without a stool.  Then she will have some looser stools.  She never has edwina diarrhea.    No family hx CRC      Prior Endoscopy:  EGD: Colon: none    (Portions of this note were dictated using voice recognition software and may contain dictation related errors in spelling/grammar/syntax not found on text review)    Review of Systems   Constitutional: Negative for fever and weight loss.   HENT: Negative for nosebleeds and sore throat.    Eyes: Negative for double vision and photophobia.   Respiratory: Negative for cough and shortness of breath.    Cardiovascular: Positive for chest pain. Negative for leg swelling.   Gastrointestinal: Positive for abdominal pain and constipation. Negative for blood in stool.   Genitourinary: Negative for dysuria and hematuria.   Musculoskeletal: Negative for joint pain and neck pain.   Skin: Negative for itching and rash.    Neurological: Negative for dizziness and headaches.   Psychiatric/Behavioral: Negative for hallucinations. The patient does not have insomnia.        Past Medical History: has a past medical history of Allergy and Anxiety.    Past Surgical History: has a past surgical history that includes Tubal ligation and Hysterectomy.    Family History:family history includes Breast cancer in her maternal aunt; Cancer in her father; Hypertension in her mother.    Allergies:   Review of patient's allergies indicates:   Allergen Reactions    Cyanocobalamin (vitamin b-12) Rash    Sulfamethoxazole-trimethoprim     Sulfa (sulfonamide antibiotics) Rash       Social History: reports that she has never smoked. She has never used smokeless tobacco. She reports previous alcohol use. She reports that she does not use drugs.    Home medications:   Current Outpatient Medications on File Prior to Visit   Medication Sig Dispense Refill    albuterol (VENTOLIN HFA) 90 mcg/actuation inhaler Inhale 2 puffs into the lungs every 6 (six) hours as needed for Wheezing or Shortness of Breath. Rescue 18 g 0    black cohosh 20 mg Tab Take by mouth.      fluticasone furoate-vilanteroL (BREO) 200-25 mcg/dose DsDv diskus inhaler Inhale 1 puff into the lungs once daily. Controller 60 each 0    multivitamin capsule Take 1 capsule by mouth once daily.      pantoprazole (PROTONIX) 40 MG tablet Take 1 tablet (40 mg total) by mouth once daily. 90 tablet 3    dextroamphetamine-amphetamine (ADDERALL) 20 mg tablet Take 1 tablet by mouth once daily. (Patient not taking: Reported on 7/15/2020) 30 tablet 0    ergocalciferol (VITAMIN D2) 50,000 unit Cap Take 50,000 Units by mouth every 7 days.      estradiol (ESTRACE) 0.01 % (0.1 mg/gram) vaginal cream Place 1 g vaginally every 7 days. (Patient not taking: Reported on 7/15/2020) 42.5 g 3    hydrOXYzine pamoate (VISTARIL) 50 MG Cap Take 1-2 capsules ( mg total) by mouth 4 (four) times daily as needed  "(anxiety - may cause sedation.). (Patient not taking: Reported on 7/15/2020) 30 capsule 0     No current facility-administered medications on file prior to visit.        Vital signs:  Ht 5' 2" (1.575 m)   Wt 100 kg (220 lb 7.4 oz)   LMP 01/01/2012   BMI 40.32 kg/m²     Physical Exam  Vitals signs reviewed.   Constitutional:       General: She is not in acute distress.     Appearance: She is not diaphoretic.   HENT:      Head: Normocephalic and atraumatic.   Eyes:      General: No scleral icterus.     Conjunctiva/sclera: Conjunctivae normal.   Neck:      Musculoskeletal: Neck supple.   Cardiovascular:      Rate and Rhythm: Normal rate.      Heart sounds: Normal heart sounds.   Pulmonary:      Effort: Pulmonary effort is normal. No respiratory distress.      Breath sounds: Normal breath sounds. No stridor.   Abdominal:      General: There is no distension.      Palpations: Abdomen is soft. There is no mass.      Tenderness: There is no abdominal tenderness. There is no guarding or rebound.   Musculoskeletal:         General: No tenderness or deformity.   Lymphadenopathy:      Cervical: No cervical adenopathy.   Skin:     General: Skin is warm and dry.      Findings: No rash.   Neurological:      Mental Status: She is alert and oriented to person, place, and time.      Gait: Gait normal.   Psychiatric:         Mood and Affect: Mood normal.         Behavior: Behavior normal.         Routine labs:  Lab Results   Component Value Date    WBC 4.87 07/17/2020    HGB 12.7 07/17/2020    HCT 40.9 07/17/2020     (H) 07/17/2020     07/17/2020     No results found for: INR  No results found for: IRON, FERRITIN, TIBC, FESATURATED  Lab Results   Component Value Date     07/17/2020    K 3.9 07/17/2020     07/17/2020    CO2 25 07/17/2020    BUN 9 07/17/2020    CREATININE 0.8 07/17/2020     Lab Results   Component Value Date    ALBUMIN 3.9 07/17/2020    ALT 19 07/17/2020    AST 19 07/17/2020    ALKPHOS 93 " 07/17/2020    BILITOT 0.4 07/17/2020     No results found for: GLUCOSE    I have reviewed prior labs, imaging, notes from last month      Assessment:  1. Screen for colon cancer    2. Mucus in stool      She is due for screening, now with incidental mucus in stool    Plan:  Orders Placed This Encounter    COVID-19 Routine Screening    polyethylene glycol (GOLYTELY,NULYTELY) 236-22.74-6.74 -5.86 gram suspension    Case request GI: COLONOSCOPY     Advised trial of benefiber  Schedule screening colon    Recommended she follow up with PCP and have stress testing done for chest pains.      RTC after endoscopy      Shadi Smith MD  [facility] [place]

## 2020-08-06 ENCOUNTER — TELEPHONE (OUTPATIENT)
Dept: NEUROLOGY | Facility: CLINIC | Age: 50
End: 2020-08-06

## 2020-08-10 ENCOUNTER — TELEPHONE (OUTPATIENT)
Dept: NEUROLOGY | Facility: CLINIC | Age: 50
End: 2020-08-10

## 2020-08-12 ENCOUNTER — OFFICE VISIT (OUTPATIENT)
Dept: NEUROLOGY | Facility: CLINIC | Age: 50
End: 2020-08-12
Payer: COMMERCIAL

## 2020-08-12 DIAGNOSIS — F98.8 ATTENTION DEFICIT DISORDER (ADD) IN ADULT: ICD-10-CM

## 2020-08-12 DIAGNOSIS — F32.A ANXIETY AND DEPRESSION: Primary | ICD-10-CM

## 2020-08-12 DIAGNOSIS — R41.3 SHORT-TERM MEMORY LOSS: ICD-10-CM

## 2020-08-12 DIAGNOSIS — F41.9 ANXIETY AND DEPRESSION: Primary | ICD-10-CM

## 2020-08-12 PROCEDURE — 90791 PR PSYCHIATRIC DIAGNOSTIC EVALUATION: ICD-10-PCS | Mod: 95,,, | Performed by: PSYCHIATRY & NEUROLOGY

## 2020-08-12 PROCEDURE — 99499 UNLISTED E&M SERVICE: CPT | Mod: 95,,, | Performed by: PSYCHIATRY & NEUROLOGY

## 2020-08-12 PROCEDURE — 99499 NO LOS: ICD-10-PCS | Mod: 95,,, | Performed by: PSYCHIATRY & NEUROLOGY

## 2020-08-12 PROCEDURE — 90791 PSYCH DIAGNOSTIC EVALUATION: CPT | Mod: 95,,, | Performed by: PSYCHIATRY & NEUROLOGY

## 2020-08-12 NOTE — PATIENT INSTRUCTIONS
I have placed a referral for [facility]. You can receive either psychotherapy, medications, or both, depending on your preferences.     PLEASE CALL [phone] TO SCHEDULE WITH BEHAVIORAL HEALTH      Anxiety Coping Strategies: Try these when you're feeling anxious or stressed:   Stress management: Limit potential triggers by managing stress levels. Keep an eye on pressures and deadlines, organize daunting tasks in to-do lists, and take enough time off from professional or educational obligations.   Take a time-out. Practice yoga, listen to music, meditate, get a massage, or learn relaxation techniques. Stepping back from the problem helps clear your head.   Eat well-balanced meals. Do not skip any meals. Do keep healthful, energy-boosting snacks on hand.   Limit alcohol and caffeine, which can aggravate anxiety and trigger panic attacks.   Get enough sleep. When stressed, your body needs additional sleep and rest.   Exercise daily: Physical exertion and an active lifestyle can improve self-image and trigger the release of chemicals in the brain that stimulate positive emotions.   Welcome humor. A good laugh goes a long way.   Maintain a positive attitude. Make an effort to replace negative thoughts with positive ones.   Get involved. Volunteer or find another way to be active in your community, which creates a support network and gives you a break from everyday stress.   Learn what triggers your anxiety. Is it work, family, school, or something else you can identify? Write in a journal when youre feeling stressed or anxious, and look for a pattern.   Support network: Talk to a person who is supportive, such as a family member or friend. Avoid storing up and suppressing anxious feelings as this can worsen anxiety disorders.   Relaxation techniques: Certain measures can help reduce signs of anxiety, including deep-breathing exercises, long baths, meditation,  yoga, and resting in the dark.   Exercises to replace negative thoughts with positive ones: Write down a list of any negative thoughts, and make another list of positive thoughts to replace them. Picturing yourself successfully facing and conquering a specific fear can also provide benefits if the anxiety symptoms link to a specific stressor.   Slow breathing. When youre anxious, your breathing becomes faster and shallower. Try deliberately slowing down your breathing. Count to three as you breathe in slowly - then count to three as you breathe out slowly.   Progressive muscle relaxation. Find a quiet location. Close your eyes and slowly tense and then relax each of your muscle groups from your toes to your head. Hold the tension for three seconds and then release quickly. This can help reduce the feelings of muscle tension that often comes with anxiety.   Stay in the present moment. Anxiety can make your thoughts live in a terrible future that hasnt happened yet. Try to bring yourself back to where you are. Practice a grounding technique: Name 5 things you can see, 4 things you can hear, 3 things you can feel, 2 things you can smell, and 1 thing you can taste.   Healthy lifestyle. Keeping active, eating well, going out into nature, spending time with family and friends, reducing stress and doing the activities you enjoy are all effective in reducing anxiety and improving your wellbeing.      Take small acts of bravery. Avoiding what makes you anxious provides some relief in the short term, but can make you more anxious in the long term. Try approaching something that makes you anxious - even in a small way. The way through anxiety is by learning that what you fear isnt likely to happen - and if it does, youll be able to cope with it.   Challenge your self-talk. How you think affects how you feel. Anxiety can make you overestimate the danger in a situation and underestimate your ability to handle it. Try  to think of different interpretations to a situation thats making you anxious, rather than jumping to the worst-case scenario. Look at the facts for and against your thought being true.   Plan worry time. Its hard to stop worrying entirely so set aside some time to indulge your worries. Even 10 minutes each evening to write them down or go over them in your head can help stop your worries from taking over at other times.   Get to know your anxiety. Keep a diary of when its at its best - and worst. Find the patterns and plan your week - or day - to proactively manage your anxiety.   Learn from others. Talking with others who also experience anxiety - or are going through something similar - can help you feel less alone.    Be kind to yourself. Remember that you are not your anxiety. You are not weak. You are not inferior. You have a mental health condition. Its called anxiety.     Stress Management Techniques    Take a break from the stressor. It may seem difficult to get away from a big work project, a crying baby or a growing credit card bill. But when you give yourself permission to step away from it, you let yourself have time to do something else, which can help you have a new perspective or practice techniques to feel less overwhelmed. Its important to not avoid your stress (those bills have to be paid sometime), but even just 20-minutes to take care of yourself is helpful.    Exercise. The research keeps growing -- exercise benefits your mind just as well as your body. We keep hearing about the long-term benefits of a regular exercise routine. But even a 20-minute walk, run, swim or dance session in the midst of a stressful time can give an immediate effect that can last for several hours.    Smile and laugh. Our brains are interconnected with our emotions and facial expressions. When people are stressed, they often hold a lot of the stress in their face. So laughs or smiles can help relieve some of  that tension and improve the situation.    Get social support. Call a friend, send an email. When you share your concerns or feelings with another person, it does help relieve stress. But its important that the person whom you talk to is someone whom you trust and whom you feel can understand and validate you. If your family is a stressor, for example, it may not alleviate your stress if you share your works woes with one of them.    Meditate. Meditation and mindful prayer help the mind and body to relax and focus. Mindfulness can help people see new perspectives, develop self-compassion and forgiveness. When practicing a form of mindfulness, people can release emotions that may have been causing the body physical stress. Much like exercise, research has shown that even meditating briefly can reap immediate benefits.    Source: https://www.apa.org/topics/manage-stress      Your Healthy Brain - Strategies to help with attention and focus    Type of Attention Problem Cognitive Strategy   Auditory Attention  Establish eye contact and attention to the speaker to better remember instructions/directions.   Repeat back what the speaker has said or have a notepad and write down.    If you zone out, then politely say so and have them repeat what they said.    Classes often move fast and rarely will professors adjust their style to accommodate all the different preferences and needs of students. We encourage investing in a recording device that allows you to go back and re-listen to a lecture if you have missed something when taking notes.    Reduce noise that you find distracting. It may also be helpful to have some ambient noise (e.g., white noise, calm music, fan) if you can't reduce or eliminate noise.    Reduce auditory distractions. This might include turning off your phone so you aren't tempted to look at text messages when studying or listening.      Visual Attention  Reduce visual distractions. For  instance, stick to one page at a time and don't have multiple pages or screens up. Make a list of common distracters and have those in mind so you can prepare ahead of time.    Use your  to your advantage by enlarging fonts, christine, italicizing, and coloring, material.    Use visual cues to help you zero in on the material. Highlight and underline.    Be mindful that electronic devices (e.g., computers, tablets) are very prone to distractibility when attempting to read because you can be tempted to surf online, click a hyperlink, and so forth. Think about whether you prefer printed or electronic material and use that.    Our eyes can become exhausted for a while when engaging in visually based tasks. Take breaks to rest your eyes. Search online for eye relaxation exercises or talk with an optometrist.      Orienting Yourself/Monitoring/Planning  Set up a plan for the day, week, and semester/month. This includes the types of classes scheduled (e.g., matching difficult and easy classes), times of day you schedule them (e.g., attempt to schedule classes at a time when most attentive, try to have some breaks between classes), which teacher/professor (e.g., some are harder than others), and types of classes (e.g., try to schedule classes with three, 50-minute lectures rather than one 3-hour class).    plan meals, exercise, and relaxation time into the schedule. This is called pacing whereby you have some strategies to manage fatigue, hunger, and restlessness that impact attention.    Use active mental strategies to avoid attentional lapses. For instance, frequently ask: (1) What I am currently doing? (2) What was I doing before this? (3) What do I need to do next? You'll likely need an external cue for some time (e.g., alarm on phone, visual reminder) to integrate this into your day.       Sustained Attention  Have realistic expectations and plan out what you can do in a given hour, day,  and weeklong period. This planning involves writing down what you need to do and chunking it accordingly. For instance, you would make a list of all the steps involved in a given task, think about the time involved in each step, and plan what you can do in a given time period before your attention wanes.    Check off tasks as you do them. This feels good and helps keep you on track.    Avoid procrastination as this requires more sustained attention as you know the deadline is looming.    Don't push yourself so hard that you get frustrated and give up. It is also important to be practical about what you can and cannot do in a given time period. Don't be hard on yourself if you need more time or more breaks. The point is find what works for you and do that so you optimize performance.    Pay attention to your own vocational and personal interests in a task. It may be that you have more sustained attention for some tasks and not for others.    Set up structure so you aren't tempted to immediately distract yourself. This may mean unplugging the television, turning off the computer, and/or going somewhere (e.g., coffee shop, library) to limit distractions.      Divided Attention/Multi-tasking  Most people have limited divided attention and research has shown that we are less effective when we are doing several things at once (e.g., checking email, reading online, & watching television). Try to methodically engage in one task at a time to limit problems with divided attention.    Set up some organizers so you can move from one separate task to another.    Have visual cues to keep you on track when you have a lot to do. This will re-orient you and remind you. For instance you may be doing something, get distracted, but then see your notepad that has your next task.    External Strategies  Modifying the physical space to improve attention. You may want to think about how your house, office, or the classroom  "impact your attention. If you can't modify something, then think about how other means. For instance, you may not be able to make your house quiet if you have children, but getting some ear plugs or ambient noise may improve this. Or, post a do not disturb sign when you need some space for studying or work.    Get organized to reduce distractibility! Develop files at home, on your computer, and even with your email.    Social support is important and many people have or have had attention problems. Talk with your family, coworkers, classmates about what they can do to help you. They may have even developed their own strategies that prove useful to you.    If you can afford to, then think about electronic devices. They have alarms to remind you along with voice recorders. Keep a planner with you or use your phone planner         Sleep Hygiene:      Avoid watching TV, eating, and discussing emotional issues in bed. The bed should be used for sleep and sex only. If not, we can associate the bed with other activities and it often becomes difficult to fall asleep.   Minimize noise, light, and temperature extremes during sleep with ear plugs, window blinds, or an electric blanket or air conditioner. Even the slightest nighttime noises or luminescent lights can disrupt the quality of your sleep. Try to keep your bedroom at a comfortable temperature -- not too hot (above 75 degrees) or too cold (below 54 degrees).   Try to go to bed and wake up at the same time every day. A strict routine can help encourage stability in your circadian rhythm. Get out of bed even if you're still tired - sleeping in much later will make it harder to fall asleep the next night, and the cycle will continue. You may need to run a "sleep deficit" for a day to help you fall asleep more easily.     Do not watch TV in bed, and do not fall asleep to TV. Many people say leaving the TV on helps them fall asleep. However, the flickering "blue " "light" released by screens  is absorbed even through closed eyelids, and suppresses melatonin release in your brain. This can cause us to linger in the lightest stages of sleep, and miss out on more restorative, deeper sleep. A better option might be a white noise machine or doris without any light.    Try not to drink fluids after 8 p.m. This may reduce awakenings due to urination.   Avoid naps, but if you do nap, make it no more than about 25 minutes about eight hours after you awake. But if you have problems falling asleep, then no naps for you.   Do not expose your self to bright light if you need to get up at night. Use a small night-light instead. Blue light can be particularly detrimental, including the light from your cellphone, TV, or computer screen.   Nicotine is a stimulant and should be avoided particularly near bedtime and upon night awakenings. Having a smoke before bed, although it may feel relaxing, is actually putting a stimulant into your bloodstream.   Caffeine is also a stimulant and is present in coffee (100-200 mg), soda (50-75 mg), tea (50-75 mg), and various over-the-counter medications. Caffeine should be discontinued at least four to six hours before bedtime. If you consume large amounts of caffeine and you cut your self off too quickly, beware; you may get headaches that could keep you awake. Sometimes people feel they are "immune" to the effects of caffeine, and that a warm cup of coffee before bed actually helps them relax. But similar to nicotine, despite that it may feel relaxing in the moment, you are still consuming a potent stimulant, and it will act on your nervous system throughout the night causing restless and disrupted sleep even if you don't notice feeling jittery at bedtime.    Avoid alcohol in the evenings. Although alcohol is a depressant and may help you fall asleep, the subsequent metabolism that clears it from your body when you are sleeping causes a withdrawal " "syndrome. This withdrawal causes awakenings and is often associated with nightmares and sweats.   A light snack may be sleep-inducing, but a heavy meal too close to bedtime interferes with sleep. Stay away from protein and stick to carbohydrates or dairy products. Milk contains the amino acid L-tryptophan, which has been shown in research to help people go to sleep. So milk and cookies or crackers (without chocolate) may be useful and taste good as well.   Be active during the day. Get regular exercise, help burn off excess energy, and promote more sound sleep at night.    Try to get outside into the sunlight at least once per day (with sunscreen, of course!). Your circadian rhythm is primarily regulated by the light coming through your eyes, so making sure it's fully dark at night and making sure you get some sunlight during the day can reinforce your circadian rhythm.    Use mindfulness or meditation strategies to quiet a busy mind. Many people report having difficulty sleeping due to being unable "turn off" their minds. Practicing mindfulness exercises before bed - like Progressive Muscle Relaxation or a body scan - can help promote relaxation and aid in anxiety reduction. Apps such as Lettuce Eat and Calm can be useful, and there are many freely available mindfulness videos on [employer]. If anxiety continues to interfere with your sleep, seeing a behavioral health professional to learn anxiety reduction strategies can be helpful.    If you are still struggling with sleep, or struggling to implement any of these tips, behavioral health professionals who are specially trained in sleep medicine can be helpful. Modalities such as Cognitive Behavioral Therapy for Insomnia (CBT-I) can be particularly useful.     "

## 2020-08-12 NOTE — PROGRESS NOTES
NEUROPSYCHOLOGY CONSULT  Clinical Interview   Established Patient - Audio Only Telehealth Visit    Referral Information  Name: Kathrine Ashraf  MRN: [id number]  Age: 50 y.o.    : 1970  Race: Black or  Education: 18 years   Gender: female         PATRICIA: 2020  Referring Provider:   Chela Mays Md  [id number] [place] [address]  Referral Reason/Medical Necessity: Ms. Ashraf has a history of ADHD, MDD, and anxiety, now presenting with STM complaints. Neuropsychological evaluation was requested to assess current cognitive functioning, aid in differential diagnosis, and provide treatment recommendations.    Consent: The patient expressed an understanding of the purpose of the evaluation and consented to all procedures.  Procedures/Billing: Please see billing table at the end of this report.  Telemedicine:   The patient location is: [place], [place]  The chief complaint leading to consultation is: cognitive complaints  Visit type: Virtual visit with audio only (telephone)  Total time spent with patient: 43 mins  The reason for the audio only service rather than synchronous audio and video virtual visit was related to technical difficulties or patient preference/necessity.  Each patient to whom I provide medical services by telemedicine is:  (1) informed of the relationship between the physician and patient and the respective role of any other health care provider with respect to management of the patient; and (2) notified that they may decline to receive medical services by telemedicine and may withdraw from such care at any time. Patient verbally consented to receive this service via voice-only telephone call.  Consent/Emergency Plan: The patient expressed an understanding of the purpose of the evaluation and consented to all procedures. I informed the patient of limits to confidentiality and discussed an emergency plan.      ASSESSMENT:   Results from the interview  indicate the following diagnoses and treatment plan recommendations. This was discussed with patient/family on 8/12/20. The patient can follow a treatment plan without help from family.      Problem List Items Addressed This Visit        Psychiatric    Attention deficit disorder (ADD) in adult    Anxiety and depression - Primary        PLAN:     1. Pt declines testing today. She will reach out to us if she would like neuropsychological testing in the future.   2. Pt meets criteria for MDD with anxious distress. Entered referral to behavioral health, per pt request.   3. Anxiety reduction, compensatory cognitive strategies, and sleep hygiene techniques provided in AVS.       Thank you for allowing me to participate in Ms. Ashraf's care.  If you have any questions, please contact me.    Coco Gan Psy.D.  Licensed Clinical Neuropsychologist  [facility] - Department of Neurology        SUBJECTIVE:     HISTORY OF PRESENT ILLNESS   Ms. Ashraf declines cognitive testing today, as she suspects that her memory issues are mostly related to stress, as she has a lot going on right now. She has been through a series of significant stressors since 2014. Went through messy divorce, subsequent relationships that did not work out, difficult breakups. Her mother passed suddenly in 2019. Currently has stress from pandemic. States she feels very isolated. Doesn't sleep well. Was having breathing and GI problems, which she also attributes to stress. Her work is very stressful, she has had a lot of responsibilities at work. She is interested in starting psychotherapy.    Psychiatric History:   Pt denied a history of any formal mental health diagnosis or treatment. She was in marital therapy briefly. She was diagnosed with ADHD as an adult in February 2020, but suspects she has had it her entire life. She has difficulty focusing and is disorganized, has always been that way. It was affecting her job (easily distracted,  difficulty finding her place again, not meeting deadlines), so she has been taking Adderall, which has been helpful. She only takes 1/2 a pill per day, only on work days. As a child, she remembers struggling with attention/concentration, organization, daydreaming in class. She didn't make careless mistakes. No hyperactivity. She sometimes didn't finish school work or didn't do it at all. Cognitive symptoms are noticeably worse in times of heightened stress.     · Current Mood: Sad, anxious, angry   · Depressed Mood: Endorsed depressed mood and anhedonia, although some days are better. She has been watching healing/spiritual videos on YouTube, which has been helpful. She feels empowered when she watches them.  She is having trouble keeping her home uncluttered, which feels overwhelming. She needs help to go through it.   · Anxiety: Endorsed being a worrier, typically feels tense and keyed up. She has always been this way, her mother was too. Worry is not generalized, but it is frequent. She worries about the future, things she can't predict or control.  She tends to ruminate.   · Panic Attacks: Endorsed infrequent panic attacks. First panic attack was around age 21, since then has only had them during periods of very high stress. She has been having them within the last few weeks. Symptoms include feeling overwhelmed, disorganized/confused, not knowing where to begin, she feels frightened and sometimes angry.  +Heart races, shortness of breath, dizziness.   · Stress: death, divorce/separation and employment concern (work is very stressful), cluttered home. She feels very lonely.   · SI/HI: Denied   · Psychiatric Hospitalization: Denied    · Neurovegetative:  · Sleep: decreased   · Total Hours/Night: 5. Goes to bed at 10pm, wakes up at 2am, up until 6am because her mind is racing, and then usually has to get up for work. Sometimes she is able to fall back asleep around 5, sleeps for another hour. Has always had to get  up very early for work.   · Pattern:falls asleep easily, has interrupted sleep, has restless sleep and has frequent nighttime awakenings. She has longstanding middle and terminal insomnia which has made her have punctuality problems at work.   · JOSE MARIA: Never had a sleep study, but pt with risk factors including:, snoring, difficulty staying asleep/restlessness and BMI > 28  · Nightmares: Denied  · Acting out dreams: Denied  · Daytime Naps: Denied  · Appetite: normal   · Energy: decreased when stressed or when she doesn't get enough sleep   · Delusional/Paranoid Thinking: Denied  · Hallucinations: Denied  · Impulsive/Compulsive Behaviors: Denied  · Disinhibition: Denied  · Apathy: Denied  · Irritability/Agitation: Endorsed      Substance Use History:  Social History     Tobacco Use    Smoking status: Never Smoker    Smokeless tobacco: Never Used   Substance and Sexual Activity    Alcohol use: Not Currently     Comment: Socially    Drug use: Never    Sexual activity: Yes     Partners: Male   · Caffeine:           2 cups coffee per day     Medical history  Patient Active Problem List   Diagnosis    Anxiety    Attention deficit disorder (ADD) in adult    Anxiety and depression     Past Medical History:   Diagnosis Date    Allergy     Anxiety      Past Surgical History:   Procedure Laterality Date    HYSTERECTOMY      partial    TUBAL LIGATION         Pertinent Labs:  Lab Results   Component Value Date    XAHDQKIL12 848 07/17/2020     Lab Results   Component Value Date    RPR Non-reactive 07/17/2020     No results found for: FOLATE  Lab Results   Component Value Date    TSH 0.687 07/17/2020     Lab Results   Component Value Date    HGBA1C 5.0 07/17/2020     Lab Results   Component Value Date    TSZ76ZMIT Negative 07/17/2020         Current Outpatient Medications:     albuterol (VENTOLIN HFA) 90 mcg/actuation inhaler, Inhale 2 puffs into the lungs every 6 (six) hours as needed for Wheezing or Shortness of  Breath. Rescue, Disp: 18 g, Rfl: 0    black cohosh 20 mg Tab, Take by mouth., Disp: , Rfl:     dextroamphetamine-amphetamine (ADDERALL) 20 mg tablet, Take 1 tablet by mouth once daily. (Patient not taking: Reported on 7/15/2020), Disp: 30 tablet, Rfl: 0    ergocalciferol (VITAMIN D2) 50,000 unit Cap, Take 50,000 Units by mouth every 7 days., Disp: , Rfl:     estradiol (ESTRACE) 0.01 % (0.1 mg/gram) vaginal cream, Place 1 g vaginally every 7 days. (Patient not taking: Reported on 7/15/2020), Disp: 42.5 g, Rfl: 3    fluticasone furoate-vilanteroL (BREO) 200-25 mcg/dose DsDv diskus inhaler, Inhale 1 puff into the lungs once daily. Controller, Disp: 60 each, Rfl: 0    hydrOXYzine pamoate (VISTARIL) 50 MG Cap, Take 1-2 capsules ( mg total) by mouth 4 (four) times daily as needed (anxiety - may cause sedation.). (Patient not taking: Reported on 7/15/2020), Disp: 30 capsule, Rfl: 0    multivitamin capsule, Take 1 capsule by mouth once daily., Disp: , Rfl:     pantoprazole (PROTONIX) 40 MG tablet, Take 1 tablet (40 mg total) by mouth once daily., Disp: 90 tablet, Rfl: 3    Family History:  Family History   Problem Relation Age of Onset    Hypertension Mother     Cancer Father     Breast cancer Maternal Aunt     Melanoma Neg Hx      Family Neurologic History: Negative for heritable risk factors   Family Psychiatric History: Negative for heritable risk factors    Developmental/Academic Hx:  Developmental: Born and raised in [place], [place].  Product of a normal pregnancy and delivery. No developmental delays. No history of abuse.  Academic:  · Learning Difficulties: C student. Was told she could do better if she paid attention. Did still get accepted to [employer].   · Attention Difficulties: yes, very disorganized, inattentive.   · Behavioral Difficulties: Denied  · Educational Attainment: Completed 1 year at [employer] but left because she got  and had kids. 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 Good relationships with kids, but she tries not to burden them with her distress.   · Primary Source of Support: None. She does have friends but doesn't really feel comfortable leaning on them.   · Daily Activities: work, tries to go to the [place] and [place] to get fresh air, went to visit a relative this past weekend, will have an occasional glass of wine in the evenings.     MENTAL STATUS AND BEHAVIORAL OBSERVATIONS:  Appearance:  Not observed (telephone visit)  Behavior:   calm, cooperative and rapport easily established  Orientation:   Fully oriented  Sensory:   Hearing and vision adequate for virtual/telephone visit  Gait:   Not observed (virtual/telephone visit)   Psychomotor:  Not observed (telephone visit)  Speech:  Fluent and spontaneous. Normal volume, rate, pitch, tone, and prosody.  Language:  Receptive and expressive language appear intact., No evidence of word-finding difficulties in conversational speech.  Mood:   euthymic  Affect:   normal and mood-congruent  Thought Process: goal directed, linear and within normal limits  Thought Content: WNL, Denied current SI/HI. and No evidence of psychotic symptoms.  Memory:  Recent and remote appear grossly intact  Attn/Concentration:  Grossly intact  Fund of Knowledge: Broadly WNL  Judgment/Insight: Grossly intact      BILLING INFORMATION:  Billing/Services Summary          Psychiatric Diagnostic Interview Base Code (65358)  Total Units: 1    Face-to-face Total Time: 43 min.         Neurobehavioral Status Exam Base Code (54620)   Total Units: 0 Add-on (29582)  Total Units: 0   Face-to-face 0 min.    Record Review,  Integration, Report Generation 0 min.     Total Time: 0 min.    DOS is the date of the evaluation unless specified    This service was not originating from a related E/M service provided within the previous 7 days nor will  to an E/M service or procedure within the next 24 hours or my soonest available appointment.  Prevailing standard of care was able to be met in this audio-only visit.

## 2020-08-28 ENCOUNTER — TELEPHONE (OUTPATIENT)
Dept: GASTROENTEROLOGY | Facility: CLINIC | Age: 50
End: 2020-08-28

## 2020-10-04 ENCOUNTER — OFFICE VISIT (OUTPATIENT)
Dept: URGENT CARE | Facility: CLINIC | Age: 50
End: 2020-10-04
Payer: COMMERCIAL

## 2020-10-04 VITALS
DIASTOLIC BLOOD PRESSURE: 84 MMHG | RESPIRATION RATE: 18 BRPM | TEMPERATURE: 97 F | BODY MASS INDEX: 39.93 KG/M2 | SYSTOLIC BLOOD PRESSURE: 129 MMHG | HEIGHT: 62 IN | HEART RATE: 78 BPM | WEIGHT: 217 LBS | OXYGEN SATURATION: 99 %

## 2020-10-04 DIAGNOSIS — R30.0 DYSURIA: ICD-10-CM

## 2020-10-04 DIAGNOSIS — N30.00 ACUTE CYSTITIS WITHOUT HEMATURIA: Primary | ICD-10-CM

## 2020-10-04 LAB
BILIRUB UR QL STRIP: NEGATIVE
GLUCOSE UR QL STRIP: NEGATIVE
KETONES UR QL STRIP: NEGATIVE
LEUKOCYTE ESTERASE UR QL STRIP: POSITIVE
PH, POC UA: 5 (ref 5–8)
POC BLOOD, URINE: POSITIVE
POC NITRATES, URINE: NEGATIVE
PROT UR QL STRIP: POSITIVE
SP GR UR STRIP: 1.02 (ref 1–1.03)
UROBILINOGEN UR STRIP-ACNC: NORMAL (ref 0.1–1.1)

## 2020-10-04 PROCEDURE — 99214 OFFICE O/P EST MOD 30 MIN: CPT | Mod: 25,S$GLB,, | Performed by: NURSE PRACTITIONER

## 2020-10-04 PROCEDURE — 81003 URINALYSIS AUTO W/O SCOPE: CPT | Mod: QW,S$GLB,, | Performed by: NURSE PRACTITIONER

## 2020-10-04 PROCEDURE — 99214 PR OFFICE/OUTPT VISIT, EST, LEVL IV, 30-39 MIN: ICD-10-PCS | Mod: 25,S$GLB,, | Performed by: NURSE PRACTITIONER

## 2020-10-04 PROCEDURE — 81003 POCT URINALYSIS, DIPSTICK, AUTOMATED, W/O SCOPE: ICD-10-PCS | Mod: QW,S$GLB,, | Performed by: NURSE PRACTITIONER

## 2020-10-04 RX ORDER — NITROFURANTOIN 25; 75 MG/1; MG/1
100 CAPSULE ORAL 2 TIMES DAILY
Qty: 10 CAPSULE | Refills: 0 | Status: SHIPPED | OUTPATIENT
Start: 2020-10-04 | End: 2020-10-09

## 2020-10-04 NOTE — PROGRESS NOTES
"Subjective:       Patient ID: Kathrine Ashraf is a 50 y.o. female.    Vitals:  height is 5' 2" (1.575 m) and weight is 98.4 kg (217 lb). Her temperature is 97.3 °F (36.3 °C). Her blood pressure is 129/84 and her pulse is 78. Her respiration is 18 and oxygen saturation is 99%.     Chief Complaint: Dysuria    pt reports hematuria, dysuria, increased urinary frequency x 2 days. Worsening. No fever, chills, abd pain, n/v/d, back pain.     Dysuria   This is a new problem. The current episode started in the past 7 days. The problem occurs every urination. The quality of the pain is described as burning (bleeding). The patient is experiencing no pain. There has been no fever. Associated symptoms include frequency and hematuria. Pertinent negatives include no chills, nausea, urgency, vomiting or rash. She has tried acetaminophen for the symptoms.       Constitution: Negative for chills and fever.   Neck: Negative for painful lymph nodes.   Gastrointestinal: Negative for abdominal pain, nausea and vomiting.   Genitourinary: Positive for dysuria, frequency and hematuria. Negative for urgency, urine decreased, history of kidney stones, painful menstruation, irregular menstruation, missed menses, heavy menstrual bleeding, ovarian cysts, genital trauma, vaginal pain, vaginal discharge, vaginal bleeding, vaginal odor, painful intercourse, genital sore, painful ejaculation and pelvic pain.   Musculoskeletal: Negative for back pain.   Skin: Negative for rash and lesion.   Hematologic/Lymphatic: Negative for swollen lymph nodes.       Objective:      Physical Exam   Constitutional: She is oriented to person, place, and time. She appears well-developed.   HENT:   Head: Normocephalic and atraumatic.   Ears:   Right Ear: External ear normal.   Left Ear: External ear normal.   Nose: Nose normal. No nasal deformity. No epistaxis.   Mouth/Throat: Oropharynx is clear and moist and mucous membranes are normal.   Eyes: Lids are normal. "   Neck: Trachea normal, normal range of motion and phonation normal. Neck supple.   Cardiovascular: Normal pulses.   Pulmonary/Chest: Effort normal.   Abdominal: Soft. Normal appearance and bowel sounds are normal. She exhibits no distension. There is no abdominal tenderness.   Neurological: She is alert and oriented to person, place, and time.   Skin: Skin is warm, dry and intact. Psychiatric: Her speech is normal and behavior is normal.   Nursing note and vitals reviewed.    Results for orders placed or performed in visit on 10/04/20   POCT Urinalysis, Dipstick, Automated, W/O Scope   Result Value Ref Range    POC Blood, Urine Positive (A) Negative    POC Bilirubin, Urine Negative Negative    POC Urobilinogen, Urine Normal 0.1 - 1.1    POC Ketones, Urine Negative Negative    POC Protein, Urine Positive (A) Negative    POC Nitrates, Urine Negative Negative    POC Glucose, Urine Negative Negative    pH, UA 5.0 5 - 8    POC Specific Gravity, Urine 1.020 1.003 - 1.029    POC Leukocytes, Urine Positive (A) Negative           Assessment:       1. Acute cystitis without hematuria    2. Dysuria        Plan:         Acute cystitis without hematuria  -     nitrofurantoin, macrocrystal-monohydrate, (MACROBID) 100 MG capsule; Take 1 capsule (100 mg total) by mouth 2 (two) times daily. for 5 days  Dispense: 10 capsule; Refill: 0    Dysuria  -     POCT Urinalysis, Dipstick, Automated, W/O Scope         Reviewed previous pertinent office visits, PMH, PSH, fam hx  Advised on return/follow-up precautions. Advised on ER precautions. Answered all patient questions. Patient verbalized understanding and voiced agreement with current treatment plan.    Patient Instructions   Start using flonase twice daily nasal drainage  Start antibiotic for UTI  Follow up with PCP if no improvement       Bladder Infection, Female (Adult)    Urine is normally doesn't have any bacteria in it. But bacteria can get into the urinary tract from the skin  "around the rectum. Or they can travel in the blood from elsewhere in the body. Once they are in your urinary tract, they can cause infection in the urethra (urethritis), the bladder (cystitis), or the kidneys (pyelonephritis).  The most common place for an infection is in the bladder. This is called a bladder infection. This is one of the most common infections in women. Most bladder infections are easily treated. They are not serious unless the infection spreads to the kidney.  The phrases "bladder infection," "UTI," and "cystitis" are often used to describe the same thing. But they are not always the same. Cystitis is an inflammation of the bladder. The most common cause of cystitis is an infection.  Symptoms  The infection causes inflammation in the urethra and bladder. This causes many of the symptoms. The most common symptoms of a bladder infection are:  · Pain or burning when urinating  · Having to urinate more often than usual  · Urgent need to urinate  · Only a small amount of urine comes out  · Blood in urine  · Abdominal discomfort. This is usually in the lower abdomen above the pubic bone.  · Cloudy urine  · Strong- or bad-smelling urine  · Unable to urinate (urinary retention)  · Unable to hold urine in (urinary incontinence)  · Fever  · Loss of appetite  · Confusion (in older adults)  Causes  Bladder infections are not contagious. You can't get one from someone else, from a toilet seat, or from sharing a bath.  The most common cause of bladder infections is bacteria from the bowels. The bacteria get onto the skin around the opening of the urethra. From there, they can get into the urine and travel up to the bladder, causing inflammation and infection. This usually happens because of:  · Wiping improperly after urinating. Always wipe from front to back.  · Bowel incontinence  · Pregnancy  · Procedures such as having a catheter inserted  · Older age  · Not emptying your bladder. This can allow bacteria a " chance to grow in your urine.  · Dehydration  · Constipation  · Sex  · Use of a diaphragm for birth control   Treatment  Bladder infections are diagnosed by a urine test. They are treated with antibiotics and usually clear up quickly without complications. Treatment helps prevent a more serious kidney infection.  Medicines  Medicines can help in the treatment of a bladder infection:  · Take antibiotics until they are used up, even if you feel better. It is important to finish them to make sure the infection has cleared.  · You can use acetaminophen or ibuprofen for pain, fever, or discomfort, unless another medicine was prescribed. If you have chronic liver or kidney disease, talk with your healthcare provider before using these medicines. Also talk with your provider if you've ever had a stomach ulcer or gastrointestinal bleeding, or are taking blood-thinner medicines.  · If you are given phenazopydridine to reduce burning with urination, it will cause your urine to become a bright orange color. This can stain clothing.  Care and prevention  These self-care steps can help prevent future infections:  · Drink plenty of fluids to prevent dehydration and flush out your bladder. Do this unless you must restrict fluids for other health reasons, or your doctor told you not to.  · Proper cleaning after going to the bathroom is important. Wipe from front to back after using the toilet to prevent the spread of bacteria.  · Urinate more often. Don't try to hold urine in for a long time.  · Wear loose-fitting clothes and cotton underwear. Avoid tight-fitting pants.  · Improve your diet and prevent constipation. Eat more fresh fruit and vegetables, and fiber, and less junk and fatty foods.  · Avoid sex until your symptoms are gone.  · Avoid caffeine, alcohol, and spicy foods. These can irritate your bladder.  · Urinate right after intercourse to flush out your bladder.  · If you use birth control pills and have frequent bladder  infections, discuss it with your doctor.  Follow-up care  Call your healthcare provider if all symptoms are not gone after 3 days of treatment. This is especially important if you have repeat infections.  If a culture was done, you will be told if your treatment needs to be changed. If directed, you can call to find out the results.  If X-rays were done, you will be told if the results will affect your treatment.  Call 911  Call 911 if any of the following occur:  · Trouble breathing  · Hard to wake up or confusion  · Fainting or loss of consciousness  · Rapid heart rate  When to seek medical advice  Call your healthcare provider right away if any of these occur:  · Fever of 100.4ºF (38.0ºC) or higher, or as directed by your healthcare provider  · Symptoms are not better by the third day of treatment  · Back or belly (abdominal) pain that gets worse  · Repeated vomiting, or unable to keep medicine down  · Weakness or dizziness  · Vaginal discharge  · Pain, redness, or swelling in the outer vaginal area (labia)  Date Last Reviewed: 10/1/2016  © 8008-2971 [email or web address]. [address] [place] [address]. All rights reserved. This information is not intended as a substitute for professional medical care. Always follow your healthcare professional's instructions.

## 2020-10-04 NOTE — PATIENT INSTRUCTIONS
"Start using flonase twice daily nasal drainage  Start antibiotic for UTI  Follow up with PCP if no improvement       Bladder Infection, Female (Adult)    Urine is normally doesn't have any bacteria in it. But bacteria can get into the urinary tract from the skin around the rectum. Or they can travel in the blood from elsewhere in the body. Once they are in your urinary tract, they can cause infection in the urethra (urethritis), the bladder (cystitis), or the kidneys (pyelonephritis).  The most common place for an infection is in the bladder. This is called a bladder infection. This is one of the most common infections in women. Most bladder infections are easily treated. They are not serious unless the infection spreads to the kidney.  The phrases "bladder infection," "UTI," and "cystitis" are often used to describe the same thing. But they are not always the same. Cystitis is an inflammation of the bladder. The most common cause of cystitis is an infection.  Symptoms  The infection causes inflammation in the urethra and bladder. This causes many of the symptoms. The most common symptoms of a bladder infection are:  · Pain or burning when urinating  · Having to urinate more often than usual  · Urgent need to urinate  · Only a small amount of urine comes out  · Blood in urine  · Abdominal discomfort. This is usually in the lower abdomen above the pubic bone.  · Cloudy urine  · Strong- or bad-smelling urine  · Unable to urinate (urinary retention)  · Unable to hold urine in (urinary incontinence)  · Fever  · Loss of appetite  · Confusion (in older adults)  Causes  Bladder infections are not contagious. You can't get one from someone else, from a toilet seat, or from sharing a bath.  The most common cause of bladder infections is bacteria from the bowels. The bacteria get onto the skin around the opening of the urethra. From there, they can get into the urine and travel up to the bladder, causing inflammation and " infection. This usually happens because of:  · Wiping improperly after urinating. Always wipe from front to back.  · Bowel incontinence  · Pregnancy  · Procedures such as having a catheter inserted  · Older age  · Not emptying your bladder. This can allow bacteria a chance to grow in your urine.  · Dehydration  · Constipation  · Sex  · Use of a diaphragm for birth control   Treatment  Bladder infections are diagnosed by a urine test. They are treated with antibiotics and usually clear up quickly without complications. Treatment helps prevent a more serious kidney infection.  Medicines  Medicines can help in the treatment of a bladder infection:  · Take antibiotics until they are used up, even if you feel better. It is important to finish them to make sure the infection has cleared.  · You can use acetaminophen or ibuprofen for pain, fever, or discomfort, unless another medicine was prescribed. If you have chronic liver or kidney disease, talk with your healthcare provider before using these medicines. Also talk with your provider if you've ever had a stomach ulcer or gastrointestinal bleeding, or are taking blood-thinner medicines.  · If you are given phenazopydridine to reduce burning with urination, it will cause your urine to become a bright orange color. This can stain clothing.  Care and prevention  These self-care steps can help prevent future infections:  · Drink plenty of fluids to prevent dehydration and flush out your bladder. Do this unless you must restrict fluids for other health reasons, or your doctor told you not to.  · Proper cleaning after going to the bathroom is important. Wipe from front to back after using the toilet to prevent the spread of bacteria.  · Urinate more often. Don't try to hold urine in for a long time.  · Wear loose-fitting clothes and cotton underwear. Avoid tight-fitting pants.  · Improve your diet and prevent constipation. Eat more fresh fruit and vegetables, and fiber, and  less junk and fatty foods.  · Avoid sex until your symptoms are gone.  · Avoid caffeine, alcohol, and spicy foods. These can irritate your bladder.  · Urinate right after intercourse to flush out your bladder.  · If you use birth control pills and have frequent bladder infections, discuss it with your doctor.  Follow-up care  Call your healthcare provider if all symptoms are not gone after 3 days of treatment. This is especially important if you have repeat infections.  If a culture was done, you will be told if your treatment needs to be changed. If directed, you can call to find out the results.  If X-rays were done, you will be told if the results will affect your treatment.  Call 911  Call 911 if any of the following occur:  · Trouble breathing  · Hard to wake up or confusion  · Fainting or loss of consciousness  · Rapid heart rate  When to seek medical advice  Call your healthcare provider right away if any of these occur:  · Fever of 100.4ºF (38.0ºC) or higher, or as directed by your healthcare provider  · Symptoms are not better by the third day of treatment  · Back or belly (abdominal) pain that gets worse  · Repeated vomiting, or unable to keep medicine down  · Weakness or dizziness  · Vaginal discharge  · Pain, redness, or swelling in the outer vaginal area (labia)  Date Last Reviewed: 10/1/2016  © 9551-3949 [employer]. [address] [place] [address], [place] [address]. All rights reserved. This information is not intended as a substitute for professional medical care. Always follow your healthcare professional's instructions.

## 2020-11-23 ENCOUNTER — OFFICE VISIT (OUTPATIENT)
Dept: OBSTETRICS AND GYNECOLOGY | Facility: CLINIC | Age: 50
End: 2020-11-23
Payer: COMMERCIAL

## 2020-11-23 VITALS
DIASTOLIC BLOOD PRESSURE: 90 MMHG | WEIGHT: 229.75 LBS | HEIGHT: 62 IN | SYSTOLIC BLOOD PRESSURE: 140 MMHG | BODY MASS INDEX: 42.28 KG/M2

## 2020-11-23 DIAGNOSIS — B96.89 BV (BACTERIAL VAGINOSIS): Primary | ICD-10-CM

## 2020-11-23 DIAGNOSIS — N76.0 BV (BACTERIAL VAGINOSIS): Primary | ICD-10-CM

## 2020-11-23 DIAGNOSIS — N95.2 VAGINAL ATROPHY: ICD-10-CM

## 2020-11-23 PROCEDURE — 87210 PR  SMEAR,STAIN,WET MNT,INTERP: ICD-10-PCS | Mod: QW,S$GLB,, | Performed by: OBSTETRICS & GYNECOLOGY

## 2020-11-23 PROCEDURE — 1126F AMNT PAIN NOTED NONE PRSNT: CPT | Mod: S$GLB,,, | Performed by: OBSTETRICS & GYNECOLOGY

## 2020-11-23 PROCEDURE — 99214 OFFICE O/P EST MOD 30 MIN: CPT | Mod: S$GLB,,, | Performed by: OBSTETRICS & GYNECOLOGY

## 2020-11-23 PROCEDURE — 87210 SMEAR WET MOUNT SALINE/INK: CPT | Mod: QW,S$GLB,, | Performed by: OBSTETRICS & GYNECOLOGY

## 2020-11-23 PROCEDURE — 3008F BODY MASS INDEX DOCD: CPT | Mod: CPTII,S$GLB,, | Performed by: OBSTETRICS & GYNECOLOGY

## 2020-11-23 PROCEDURE — 1126F PR PAIN SEVERITY QUANTIFIED, NO PAIN PRESENT: ICD-10-PCS | Mod: S$GLB,,, | Performed by: OBSTETRICS & GYNECOLOGY

## 2020-11-23 PROCEDURE — 99999 PR PBB SHADOW E&M-EST. PATIENT-LVL III: ICD-10-PCS | Mod: PBBFAC,,, | Performed by: OBSTETRICS & GYNECOLOGY

## 2020-11-23 PROCEDURE — 3008F PR BODY MASS INDEX (BMI) DOCUMENTED: ICD-10-PCS | Mod: CPTII,S$GLB,, | Performed by: OBSTETRICS & GYNECOLOGY

## 2020-11-23 PROCEDURE — 99214 PR OFFICE/OUTPT VISIT, EST, LEVL IV, 30-39 MIN: ICD-10-PCS | Mod: S$GLB,,, | Performed by: OBSTETRICS & GYNECOLOGY

## 2020-11-23 PROCEDURE — 99999 PR PBB SHADOW E&M-EST. PATIENT-LVL III: CPT | Mod: PBBFAC,,, | Performed by: OBSTETRICS & GYNECOLOGY

## 2020-11-23 RX ORDER — METRONIDAZOLE 500 MG/1
500 TABLET ORAL 2 TIMES DAILY
Qty: 14 TABLET | Refills: 0 | Status: SHIPPED | OUTPATIENT
Start: 2020-11-23 | End: 2020-11-30

## 2020-11-23 RX ORDER — FLUCONAZOLE 150 MG/1
150 TABLET ORAL ONCE
Qty: 1 TABLET | Refills: 0 | Status: SHIPPED | OUTPATIENT
Start: 2020-11-23 | End: 2020-11-23

## 2020-11-23 NOTE — PROGRESS NOTES
Subjective:       Patient ID: Kathrine Ashraf is a 50 y.o. female.    Chief Complaint:  Vaginal Discharge      History of Present Illness  HPI  Vaginal Discharge and Irritation  Patient presents for vaginal discharge check. Sexual history reviewed with the patient. STD exposure: denies knowledge of risky exposure.  Previous history of STD:  none. Current symptoms include vaginal discharge: white, vaginal irritation: moderate.  Also has noted mild lower abdominal discomfort for the past month.  Pt reports history of hysterectomy in  for benign indications.  Still has her ovaries.      GYN & OB History  Patient's last menstrual period was 2012.   Date of Last Pap: No result found    OB History    Para Term  AB Living   3 3 3     3   SAB TAB Ectopic Multiple Live Births           3      # Outcome Date GA Lbr Vignesh/2nd Weight Sex Delivery Anes PTL Lv   3 Term  40w0d   M Vag-Spont EPI  DORENE   2 Term  40w0d   F Vag-Spont EPI  DORENE   1 Term  40w0d   M CS-Unspec EPI  DORENE       Review of Systems  Review of Systems   Constitutional: Negative for activity change, appetite change, chills, fatigue, fever and unexpected weight change.   Respiratory: Negative for shortness of breath.    Cardiovascular: Negative for chest pain, palpitations and leg swelling.   Gastrointestinal: Negative for abdominal pain, bloating, blood in stool, constipation, diarrhea, nausea and vomiting.   Genitourinary: Positive for vaginal discharge and vaginal pain. Negative for dyspareunia, dysuria, flank pain, frequency, genital sores, hematuria, hot flashes, pelvic pain, urgency, vaginal bleeding, urinary incontinence, postcoital bleeding, vaginal dryness and vaginal odor.   Musculoskeletal: Negative for back pain.   Neurological: Negative for syncope and headaches.           Objective:    Physical Exam:   Constitutional: She is oriented to person, place, and time. She appears well-developed and well-nourished. No  distress.    HENT:   Head: Normocephalic and atraumatic.    Eyes: Pupils are equal, round, and reactive to light. EOM are normal.    Neck: Normal range of motion.    Cardiovascular: Normal rate and regular rhythm.     Pulmonary/Chest: Effort normal.        Abdominal: Soft. She exhibits no distension. There is no abdominal tenderness.     Genitourinary:    Pelvic exam was performed with patient supine.   There is no rash, tenderness, lesion or injury on the right labia. There is no rash, tenderness, lesion or injury on the left labia. Uterus is absent. Right adnexum displays no mass, no tenderness and no fullness. Left adnexum displays no mass, no tenderness and no fullness. No erythema, tenderness or bleeding in the vagina.    No foreign body in the vagina.      No signs of injury in the vagina.   Vaginal cuff normal.Cervix exhibits absence.    Genitourinary Comments: Wet prep: many clue cells and few yeast; negative for trichomonas   positive for vaginal discharge          Musculoskeletal: Normal range of motion and moves all extremeties. No tenderness or edema.       Neurological: She is alert and oriented to person, place, and time.    Skin: Skin is warm and dry.    Psychiatric: She has a normal mood and affect. Her behavior is normal. Thought content normal.          Assessment:        1. BV (bacterial vaginosis)    2. Vaginal atrophy             Plan:      BV (bacterial vaginosis)  -     POCT Wet Prep  -     metroNIDAZOLE (FLAGYL) 500 MG tablet; Take 1 tablet (500 mg total) by mouth 2 (two) times daily. for 7 days  Dispense: 14 tablet; Refill: 0  -     fluconazole (DIFLUCAN) 150 MG Tab; Take 1 tablet (150 mg total) by mouth once. for 1 dose  Dispense: 1 tablet; Refill: 0  -     Medication details, dosing, risks, side-effects, and interactions were discussed.    Vaginal atrophy  -     Pt has not been using estrogen cream correctly (has only been using prn).  Pt was advised on correct use.      Follow up if  symptoms worsen or fail to improve.

## 2020-11-23 NOTE — LETTER
November 23, 2020      Chela Rivas MD  [address] [facility] [address]           [facility]  [address] [facility]  [place] [address]  Phone: [phone]  Fax: [phone]          Patient: Kathrine Ashraf   MR Number: [id number]   YOB: 1970   Date of Visit: 11/23/2020       Dear Dr. Chela Rivas:    Thank you for referring Kathrine Ashraf to me for evaluation. Attached you will find relevant portions of my assessment and plan of care.    If you have questions, please do not hesitate to call me. I look forward to following Kathrine Ashraf along with you.    Sincerely,    Amos Link MD    Enclosure  CC:  No Recipients    If you would like to receive this communication electronically, please contact [email or web address] or [phone] to request more information on [facility] access.    For providers and/or their staff who would like to refer a patient to [facility], please contact us through our one-stop-shop provider referral line, [facility], at [phone].    If you feel you have received this communication in error or would no longer like to receive these types of communications, please e-mail [email or web address]

## 2021-01-27 DIAGNOSIS — Z12.31 OTHER SCREENING MAMMOGRAM: ICD-10-CM

## 2021-02-19 ENCOUNTER — HOSPITAL ENCOUNTER (OUTPATIENT)
Dept: RADIOLOGY | Facility: HOSPITAL | Age: 51
Discharge: HOME | End: 2021-02-19
Attending: FAMILY MEDICINE
Payer: COMMERCIAL

## 2021-02-19 DIAGNOSIS — Z12.31 OTHER SCREENING MAMMOGRAM: ICD-10-CM

## 2021-02-19 PROCEDURE — 77063 BREAST TOMOSYNTHESIS BI: CPT | Mod: 26,,, | Performed by: RADIOLOGY

## 2021-02-19 PROCEDURE — 77067 MAMMO DIGITAL SCREENING BILAT WITH TOMO: ICD-10-PCS | Mod: 26,,, | Performed by: RADIOLOGY

## 2021-02-19 PROCEDURE — 77067 SCR MAMMO BI INCL CAD: CPT | Mod: TC,PN

## 2021-02-19 PROCEDURE — 77067 SCR MAMMO BI INCL CAD: CPT | Mod: 26,,, | Performed by: RADIOLOGY

## 2021-02-19 PROCEDURE — 77063 MAMMO DIGITAL SCREENING BILAT WITH TOMO: ICD-10-PCS | Mod: 26,,, | Performed by: RADIOLOGY

## 2021-03-01 ENCOUNTER — OFFICE VISIT (OUTPATIENT)
Dept: OBSTETRICS AND GYNECOLOGY | Facility: CLINIC | Age: 51
End: 2021-03-01
Payer: COMMERCIAL

## 2021-03-01 VITALS
BODY MASS INDEX: 42.72 KG/M2 | WEIGHT: 232.13 LBS | HEIGHT: 62 IN | DIASTOLIC BLOOD PRESSURE: 72 MMHG | SYSTOLIC BLOOD PRESSURE: 126 MMHG

## 2021-03-01 DIAGNOSIS — N90.89 VULVAR IRRITATION: Primary | ICD-10-CM

## 2021-03-01 DIAGNOSIS — N89.8 VAGINAL IRRITATION: ICD-10-CM

## 2021-03-01 PROCEDURE — 99214 OFFICE O/P EST MOD 30 MIN: CPT | Mod: S$GLB,,, | Performed by: OBSTETRICS & GYNECOLOGY

## 2021-03-01 PROCEDURE — 1126F PR PAIN SEVERITY QUANTIFIED, NO PAIN PRESENT: ICD-10-PCS | Mod: S$GLB,,, | Performed by: OBSTETRICS & GYNECOLOGY

## 2021-03-01 PROCEDURE — 1126F AMNT PAIN NOTED NONE PRSNT: CPT | Mod: S$GLB,,, | Performed by: OBSTETRICS & GYNECOLOGY

## 2021-03-01 PROCEDURE — 99999 PR PBB SHADOW E&M-EST. PATIENT-LVL III: ICD-10-PCS | Mod: PBBFAC,,, | Performed by: OBSTETRICS & GYNECOLOGY

## 2021-03-01 PROCEDURE — 3008F PR BODY MASS INDEX (BMI) DOCUMENTED: ICD-10-PCS | Mod: CPTII,S$GLB,, | Performed by: OBSTETRICS & GYNECOLOGY

## 2021-03-01 PROCEDURE — 99214 PR OFFICE/OUTPT VISIT, EST, LEVL IV, 30-39 MIN: ICD-10-PCS | Mod: S$GLB,,, | Performed by: OBSTETRICS & GYNECOLOGY

## 2021-03-01 PROCEDURE — 99999 PR PBB SHADOW E&M-EST. PATIENT-LVL III: CPT | Mod: PBBFAC,,, | Performed by: OBSTETRICS & GYNECOLOGY

## 2021-03-01 PROCEDURE — 3008F BODY MASS INDEX DOCD: CPT | Mod: CPTII,S$GLB,, | Performed by: OBSTETRICS & GYNECOLOGY

## 2021-03-01 RX ORDER — HYDROCODONE BITARTRATE AND ACETAMINOPHEN 5; 325 MG/1; MG/1
TABLET ORAL
COMMUNITY
Start: 2020-12-02 | End: 2021-03-30

## 2021-03-01 RX ORDER — MUPIROCIN 20 MG/G
OINTMENT TOPICAL
COMMUNITY
Start: 2020-12-02 | End: 2021-03-30

## 2021-03-01 RX ORDER — TRIAMCINOLONE ACETONIDE 1 MG/G
OINTMENT TOPICAL 2 TIMES DAILY
Qty: 80 G | Refills: 1 | Status: SHIPPED | OUTPATIENT
Start: 2021-03-01 | End: 2021-03-30

## 2021-03-01 RX ORDER — METRONIDAZOLE 500 MG/1
500 TABLET ORAL 2 TIMES DAILY
COMMUNITY
Start: 2021-02-18 | End: 2021-03-30

## 2021-03-01 RX ORDER — DOXYCYCLINE 100 MG/1
CAPSULE ORAL
COMMUNITY
Start: 2020-12-02 | End: 2021-03-30

## 2021-03-01 RX ORDER — FLUCONAZOLE 150 MG/1
TABLET ORAL
COMMUNITY
Start: 2021-02-18 | End: 2021-03-30

## 2021-03-01 RX ORDER — METRONIDAZOLE 500 MG/1
1 TABLET ORAL
COMMUNITY
Start: 2021-02-18 | End: 2021-03-30

## 2021-03-01 RX ORDER — FLUCONAZOLE 150 MG/1
1 TABLET ORAL
COMMUNITY
Start: 2021-02-18 | End: 2021-03-30

## 2021-03-03 LAB
CANDIDA RRNA VAG QL PROBE: NEGATIVE
G VAGINALIS RRNA GENITAL QL PROBE: NEGATIVE
T VAGINALIS RRNA GENITAL QL PROBE: NEGATIVE

## 2021-03-25 ENCOUNTER — PATIENT MESSAGE (OUTPATIENT)
Dept: PRIMARY CARE CLINIC | Facility: CLINIC | Age: 51
End: 2021-03-25

## 2021-03-29 ENCOUNTER — TELEPHONE (OUTPATIENT)
Dept: PRIMARY CARE CLINIC | Facility: CLINIC | Age: 51
End: 2021-03-29

## 2021-03-30 ENCOUNTER — OFFICE VISIT (OUTPATIENT)
Dept: PRIMARY CARE CLINIC | Facility: CLINIC | Age: 51
End: 2021-03-30
Payer: COMMERCIAL

## 2021-03-30 DIAGNOSIS — F41.9 ANXIETY: Primary | ICD-10-CM

## 2021-03-30 PROCEDURE — 99214 PR OFFICE/OUTPT VISIT, EST, LEVL IV, 30-39 MIN: ICD-10-PCS | Mod: 95,,, | Performed by: FAMILY MEDICINE

## 2021-03-30 PROCEDURE — 99214 OFFICE O/P EST MOD 30 MIN: CPT | Mod: 95,,, | Performed by: FAMILY MEDICINE

## 2021-03-30 RX ORDER — PAROXETINE 10 MG/1
10 TABLET, FILM COATED ORAL EVERY MORNING
Qty: 90 TABLET | Refills: 3 | Status: SHIPPED | OUTPATIENT
Start: 2021-03-30 | End: 2022-05-17

## 2021-04-16 ENCOUNTER — PATIENT MESSAGE (OUTPATIENT)
Dept: RESEARCH | Facility: HOSPITAL | Age: 51
End: 2021-04-16

## 2021-09-17 ENCOUNTER — TELEPHONE (OUTPATIENT)
Dept: PRIMARY CARE CLINIC | Facility: CLINIC | Age: 51
End: 2021-09-17

## 2021-09-20 ENCOUNTER — OFFICE VISIT (OUTPATIENT)
Dept: PRIMARY CARE CLINIC | Facility: CLINIC | Age: 51
End: 2021-09-20
Payer: MEDICAID

## 2021-09-20 VITALS
DIASTOLIC BLOOD PRESSURE: 78 MMHG | OXYGEN SATURATION: 97 % | HEART RATE: 82 BPM | HEIGHT: 62 IN | BODY MASS INDEX: 41.97 KG/M2 | WEIGHT: 228.06 LBS | SYSTOLIC BLOOD PRESSURE: 126 MMHG | TEMPERATURE: 98 F

## 2021-09-20 DIAGNOSIS — E66.01 CLASS 3 SEVERE OBESITY WITH BODY MASS INDEX (BMI) OF 40.0 TO 44.9 IN ADULT, UNSPECIFIED OBESITY TYPE, UNSPECIFIED WHETHER SERIOUS COMORBIDITY PRESENT: ICD-10-CM

## 2021-09-20 DIAGNOSIS — G44.329 CHRONIC POST-TRAUMATIC HEADACHE, NOT INTRACTABLE: ICD-10-CM
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
## 2021-09-20 DIAGNOSIS — I83.813 VARICOSE VEINS OF BOTH LOWER EXTREMITIES WITH PAIN: ICD-10-CM

## 2021-09-20 DIAGNOSIS — F43.9 STRESS-RELATED PROBLEM: ICD-10-CM

## 2021-09-20 PROCEDURE — 99999 PR PBB SHADOW E&M-EST. PATIENT-LVL IV: ICD-10-PCS | Mod: PBBFAC,,, | Performed by: NURSE PRACTITIONER

## 2021-09-20 PROCEDURE — 99214 OFFICE O/P EST MOD 30 MIN: CPT | Mod: S$PBB,,, | Performed by: NURSE PRACTITIONER

## 2021-09-20 PROCEDURE — 99214 OFFICE O/P EST MOD 30 MIN: CPT | Mod: PBBFAC,PN | Performed by: NURSE PRACTITIONER

## 2021-09-20 PROCEDURE — 99214 PR OFFICE/OUTPT VISIT, EST, LEVL IV, 30-39 MIN: ICD-10-PCS | Mod: S$PBB,,, | Performed by: NURSE PRACTITIONER

## 2021-09-20 PROCEDURE — 99999 PR PBB SHADOW E&M-EST. PATIENT-LVL IV: CPT | Mod: PBBFAC,,, | Performed by: NURSE PRACTITIONER

## 2021-09-20 RX ORDER — IBUPROFEN 800 MG/1
800 TABLET ORAL
Qty: 20 TABLET | Refills: 0 | Status: SHIPPED | OUTPATIENT
Start: 2021-09-20 | End: 2022-12-02

## 2021-09-20 RX ORDER — BUTALBITAL, ACETAMINOPHEN AND CAFFEINE 50; 325; 40 MG/1; MG/1; MG/1
1 TABLET ORAL EVERY 4 HOURS PRN
Qty: 15 TABLET | Refills: 0 | Status: SHIPPED | OUTPATIENT
Start: 2021-09-20 | End: 2021-10-20

## 2021-09-23 DIAGNOSIS — I83.893 VARICOSE VEINS OF LEG WITH SWELLING, BILATERAL: Primary | ICD-10-CM

## 2021-10-18 ENCOUNTER — HOSPITAL ENCOUNTER (EMERGENCY)
Facility: HOSPITAL | Age: 51
Discharge: HOME | End: 2021-10-18
Attending: EMERGENCY MEDICINE
Payer: MEDICAID

## 2021-10-18 VITALS
HEIGHT: 62 IN | TEMPERATURE: 98 F | HEART RATE: 83 BPM | BODY MASS INDEX: 41.96 KG/M2 | DIASTOLIC BLOOD PRESSURE: 72 MMHG | WEIGHT: 228 LBS | RESPIRATION RATE: 20 BRPM | SYSTOLIC BLOOD PRESSURE: 137 MMHG | OXYGEN SATURATION: 98 %

## 2021-10-18 DIAGNOSIS — R10.13 EPIGASTRIC ABDOMINAL PAIN: ICD-10-CM

## 2021-10-18 DIAGNOSIS — G89.29 CHRONIC NONINTRACTABLE HEADACHE, UNSPECIFIED HEADACHE TYPE: Primary | ICD-10-CM

## 2021-10-18 DIAGNOSIS — R51.9 CHRONIC NONINTRACTABLE HEADACHE, UNSPECIFIED HEADACHE TYPE: Primary | ICD-10-CM

## 2021-10-18 LAB
ALBUMIN SERPL BCP-MCNC: 3.5 G/DL (ref 3.5–5.2)
ALP SERPL-CCNC: 92 U/L (ref 55–135)
ALT SERPL W/O P-5'-P-CCNC: 26 U/L (ref 10–44)
ANION GAP SERPL CALC-SCNC: 7 MMOL/L (ref 8–16)
AST SERPL-CCNC: 17 U/L (ref 10–40)
BASOPHILS # BLD AUTO: 0.03 K/UL (ref 0–0.2)
BASOPHILS NFR BLD: 0.6 % (ref 0–1.9)
BILIRUB SERPL-MCNC: 0.4 MG/DL (ref 0.1–1)
BILIRUB UR QL STRIP: NEGATIVE
BUN SERPL-MCNC: 14 MG/DL (ref 6–20)
CALCIUM SERPL-MCNC: 9.4 MG/DL (ref 8.7–10.5)
CHLORIDE SERPL-SCNC: 108 MMOL/L (ref 95–110)
CLARITY UR: CLEAR
CO2 SERPL-SCNC: 24 MMOL/L (ref 23–29)
COLOR UR: YELLOW
CREAT SERPL-MCNC: 0.8 MG/DL (ref 0.5–1.4)
DIFFERENTIAL METHOD: ABNORMAL
EOSINOPHIL # BLD AUTO: 0.4 K/UL (ref 0–0.5)
EOSINOPHIL NFR BLD: 7.4 % (ref 0–8)
ERYTHROCYTE [DISTWIDTH] IN BLOOD BY AUTOMATED COUNT: 13.1 % (ref 11.5–14.5)
EST. GFR  (AFRICAN AMERICAN): >60 ML/MIN/1.73 M^2
EST. GFR  (NON AFRICAN AMERICAN): >60 ML/MIN/1.73 M^2
GLUCOSE SERPL-MCNC: 96 MG/DL (ref 70–110)
GLUCOSE UR QL STRIP: NEGATIVE
HCT VFR BLD AUTO: 38.7 % (ref 37–48.5)
HGB BLD-MCNC: 12.2 G/DL (ref 12–16)
HGB UR QL STRIP: NEGATIVE
IMM GRANULOCYTES # BLD AUTO: 0.01 K/UL (ref 0–0.04)
IMM GRANULOCYTES NFR BLD AUTO: 0.2 % (ref 0–0.5)
KETONES UR QL STRIP: NEGATIVE
LEUKOCYTE ESTERASE UR QL STRIP: NEGATIVE
LIPASE SERPL-CCNC: 22 U/L (ref 4–60)
LYMPHOCYTES # BLD AUTO: 1.7 K/UL (ref 1–4.8)
LYMPHOCYTES NFR BLD: 33 % (ref 18–48)
MCH RBC QN AUTO: 30.4 PG (ref 27–31)
MCHC RBC AUTO-ENTMCNC: 31.5 G/DL (ref 32–36)
MCV RBC AUTO: 97 FL (ref 82–98)
MONOCYTES # BLD AUTO: 0.5 K/UL (ref 0.3–1)
MONOCYTES NFR BLD: 10.7 % (ref 4–15)
NEUTROPHILS # BLD AUTO: 2.4 K/UL (ref 1.8–7.7)
NEUTROPHILS NFR BLD: 48.1 % (ref 38–73)
NITRITE UR QL STRIP: NEGATIVE
NRBC BLD-RTO: 0 /100 WBC
PH UR STRIP: 6 [PH] (ref 5–8)
PLATELET # BLD AUTO: 225 K/UL (ref 150–450)
PMV BLD AUTO: 10.8 FL (ref 9.2–12.9)
POTASSIUM SERPL-SCNC: 4.2 MMOL/L (ref 3.5–5.1)
PROT SERPL-MCNC: 7.1 G/DL (ref 6–8.4)
PROT UR QL STRIP: NEGATIVE
RBC # BLD AUTO: 4.01 M/UL (ref 4–5.4)
SODIUM SERPL-SCNC: 139 MMOL/L (ref 136–145)
SP GR UR STRIP: 1.02 (ref 1–1.03)
URN SPEC COLLECT METH UR: NORMAL
UROBILINOGEN UR STRIP-ACNC: NEGATIVE EU/DL
WBC # BLD AUTO: 5.03 K/UL (ref 3.9–12.7)

## 2021-10-18 PROCEDURE — 81003 URINALYSIS AUTO W/O SCOPE: CPT | Performed by: PHYSICIAN ASSISTANT

## 2021-10-18 PROCEDURE — 83690 ASSAY OF LIPASE: CPT | Performed by: PHYSICIAN ASSISTANT

## 2021-10-18 PROCEDURE — 99283 EMERGENCY DEPT VISIT LOW MDM: CPT

## 2021-10-18 PROCEDURE — 85025 COMPLETE CBC W/AUTO DIFF WBC: CPT | Performed by: PHYSICIAN ASSISTANT

## 2021-10-18 PROCEDURE — 36415 COLL VENOUS BLD VENIPUNCTURE: CPT | Performed by: PHYSICIAN ASSISTANT

## 2021-10-18 PROCEDURE — 25000003 PHARM REV CODE 250: Performed by: EMERGENCY MEDICINE

## 2021-10-18 PROCEDURE — 80053 COMPREHEN METABOLIC PANEL: CPT | Performed by: PHYSICIAN ASSISTANT

## 2021-10-18 RX ORDER — MAG HYDROX/ALUMINUM HYD/SIMETH 200-200-20
30 SUSPENSION, ORAL (FINAL DOSE FORM) ORAL
Status: COMPLETED | OUTPATIENT
Start: 2021-10-18 | End: 2021-10-18

## 2021-10-18 RX ORDER — OMEPRAZOLE 20 MG/1
40 CAPSULE, DELAYED RELEASE ORAL DAILY
Qty: 14 CAPSULE | Refills: 0 | Status: SHIPPED | OUTPATIENT
Start: 2021-10-18 | End: 2022-01-26

## 2021-10-18 RX ADMIN — ALUMINUM HYDROXIDE, MAGNESIUM HYDROXIDE, AND SIMETHICONE 30 ML: 200; 200; 20 SUSPENSION ORAL at 07:10

## 2021-10-19 ENCOUNTER — OFFICE VISIT (OUTPATIENT)
Dept: PRIMARY CARE CLINIC | Facility: CLINIC | Age: 51
End: 2021-10-19
Payer: MEDICAID

## 2021-10-19 VITALS
OXYGEN SATURATION: 98 % | WEIGHT: 245.5 LBS | TEMPERATURE: 99 F | DIASTOLIC BLOOD PRESSURE: 64 MMHG | BODY MASS INDEX: 45.18 KG/M2 | SYSTOLIC BLOOD PRESSURE: 126 MMHG | HEART RATE: 84 BPM | HEIGHT: 62 IN

## 2021-10-19 DIAGNOSIS — M25.512 ACUTE PAIN OF LEFT SHOULDER: ICD-10-CM

## 2021-10-19 DIAGNOSIS — Z12.11 SCREENING FOR COLON CANCER: ICD-10-CM

## 2021-10-19 DIAGNOSIS — R10.13 EPIGASTRIC PAIN: Primary | ICD-10-CM

## 2021-10-19 DIAGNOSIS — G44.329 CHRONIC POST-TRAUMATIC HEADACHE, NOT INTRACTABLE: ICD-10-CM

## 2021-10-19 DIAGNOSIS — K21.9 GASTROESOPHAGEAL REFLUX DISEASE, UNSPECIFIED WHETHER ESOPHAGITIS PRESENT: ICD-10-CM

## 2021-10-19 PROCEDURE — 99214 OFFICE O/P EST MOD 30 MIN: CPT | Mod: S$PBB,,, | Performed by: NURSE PRACTITIONER

## 2021-10-19 PROCEDURE — 99214 OFFICE O/P EST MOD 30 MIN: CPT | Mod: PBBFAC,PN | Performed by: NURSE PRACTITIONER

## 2021-10-19 PROCEDURE — 99999 PR PBB SHADOW E&M-EST. PATIENT-LVL IV: CPT | Mod: PBBFAC,,, | Performed by: NURSE PRACTITIONER

## 2021-10-19 PROCEDURE — 99999 PR PBB SHADOW E&M-EST. PATIENT-LVL IV: ICD-10-PCS | Mod: PBBFAC,,, | Performed by: NURSE PRACTITIONER

## 2021-10-19 PROCEDURE — 99214 PR OFFICE/OUTPT VISIT, EST, LEVL IV, 30-39 MIN: ICD-10-PCS | Mod: S$PBB,,, | Performed by: NURSE PRACTITIONER

## 2021-10-19 RX ORDER — AMITRIPTYLINE HYDROCHLORIDE 10 MG/1
10 TABLET, FILM COATED ORAL NIGHTLY
Qty: 30 TABLET | Refills: 11 | Status: SHIPPED | OUTPATIENT
Start: 2021-10-19 | End: 2022-01-26

## 2021-10-19 RX ORDER — DICLOFENAC SODIUM 10 MG/G
2 GEL TOPICAL DAILY PRN
Qty: 100 G | Refills: 1 | Status: SHIPPED | OUTPATIENT
Start: 2021-10-19 | End: 2022-01-26

## 2021-10-22 ENCOUNTER — OFFICE VISIT (OUTPATIENT)
Dept: VASCULAR SURGERY | Facility: CLINIC | Age: 51
End: 2021-10-22
Payer: MEDICAID

## 2021-10-22 ENCOUNTER — HOSPITAL ENCOUNTER (OUTPATIENT)
Dept: RADIOLOGY | Facility: OTHER | Age: 51
Discharge: HOME | End: 2021-10-22
Attending: SURGERY
Payer: MEDICAID

## 2021-10-22 ENCOUNTER — LAB VISIT (OUTPATIENT)
Dept: PRIMARY CARE CLINIC | Facility: CLINIC | Age: 51
End: 2021-10-22
Payer: MEDICAID

## 2021-10-22 VITALS — SYSTOLIC BLOOD PRESSURE: 130 MMHG | HEART RATE: 75 BPM | TEMPERATURE: 97 F | DIASTOLIC BLOOD PRESSURE: 59 MMHG

## 2021-10-22 DIAGNOSIS — R10.13 EPIGASTRIC PAIN: ICD-10-CM

## 2021-10-22 DIAGNOSIS — I83.893 VARICOSE VEINS OF LEG WITH SWELLING, BILATERAL: ICD-10-CM

## 2021-10-22 DIAGNOSIS — I83.813 VARICOSE VEINS OF BOTH LOWER EXTREMITIES WITH PAIN: ICD-10-CM

## 2021-10-22 DIAGNOSIS — I87.2 VENOUS INSUFFICIENCY: Primary | ICD-10-CM

## 2021-10-22 PROCEDURE — 99204 PR OFFICE/OUTPT VISIT, NEW, LEVL IV, 45-59 MIN: ICD-10-PCS | Mod: S$GLB,,, | Performed by: SURGERY

## 2021-10-22 PROCEDURE — 93970 EXTREMITY STUDY: CPT | Mod: TC

## 2021-10-22 PROCEDURE — 99204 OFFICE O/P NEW MOD 45 MIN: CPT | Mod: S$GLB,,, | Performed by: SURGERY

## 2021-10-22 PROCEDURE — 93970 EXTREMITY STUDY: CPT | Mod: 26,,, | Performed by: STUDENT IN AN ORGANIZED HEALTH CARE EDUCATION/TRAINING PROGRAM

## 2021-10-22 PROCEDURE — 87338 HPYLORI STOOL AG IA: CPT | Performed by: NURSE PRACTITIONER

## 2021-10-22 PROCEDURE — 93970 US LOWER EXTREMITY VEINS BILATERAL INSUFFICIENCY: ICD-10-PCS | Mod: 26,,, | Performed by: STUDENT IN AN ORGANIZED HEALTH CARE EDUCATION/TRAINING PROGRAM

## 2021-10-29 ENCOUNTER — TELEPHONE (OUTPATIENT)
Dept: ORTHOPEDICS | Facility: CLINIC | Age: 51
End: 2021-10-29
Payer: MEDICAID

## 2021-10-29 DIAGNOSIS — M25.512 LEFT SHOULDER PAIN, UNSPECIFIED CHRONICITY: Primary | ICD-10-CM

## 2021-10-29 DIAGNOSIS — M79.604 LOWER EXTREMITY PAIN, BILATERAL: Primary | ICD-10-CM

## 2021-10-29 DIAGNOSIS — M79.605 LOWER EXTREMITY PAIN, BILATERAL: Primary | ICD-10-CM

## 2021-10-29 LAB
H PYLORI AG STL QL IA: NORMAL
SPECIMEN SOURCE: NORMAL

## 2021-11-03 ENCOUNTER — OFFICE VISIT (OUTPATIENT)
Dept: ORTHOPEDICS | Facility: CLINIC | Age: 51
End: 2021-11-03
Payer: MEDICAID

## 2021-11-03 VITALS — WEIGHT: 245 LBS | BODY MASS INDEX: 44.81 KG/M2

## 2021-11-03 DIAGNOSIS — M25.512 LEFT SHOULDER PAIN, UNSPECIFIED CHRONICITY: ICD-10-CM

## 2021-11-03 DIAGNOSIS — M75.42 IMPINGEMENT SYNDROME OF LEFT SHOULDER: ICD-10-CM

## 2021-11-03 PROCEDURE — 99213 OFFICE O/P EST LOW 20 MIN: CPT | Mod: PBBFAC,PN | Performed by: ORTHOPAEDIC SURGERY

## 2021-11-03 PROCEDURE — 99203 PR OFFICE/OUTPT VISIT, NEW, LEVL III, 30-44 MIN: ICD-10-PCS | Mod: S$PBB,,, | Performed by: ORTHOPAEDIC SURGERY

## 2021-11-03 PROCEDURE — 99203 OFFICE O/P NEW LOW 30 MIN: CPT | Mod: S$PBB,,, | Performed by: ORTHOPAEDIC SURGERY

## 2021-11-03 PROCEDURE — 99999 PR PBB SHADOW E&M-EST. PATIENT-LVL III: CPT | Mod: PBBFAC,,, | Performed by: ORTHOPAEDIC SURGERY

## 2021-11-03 PROCEDURE — 99999 PR PBB SHADOW E&M-EST. PATIENT-LVL III: ICD-10-PCS | Mod: PBBFAC,,, | Performed by: ORTHOPAEDIC SURGERY

## 2021-11-05 ENCOUNTER — PATIENT OUTREACH (OUTPATIENT)
Dept: ADMINISTRATIVE | Facility: HOSPITAL | Age: 51
End: 2021-11-05
Payer: MEDICAID

## 2021-11-24 ENCOUNTER — PATIENT MESSAGE (OUTPATIENT)
Dept: ENDOSCOPY | Facility: HOSPITAL | Age: 51
End: 2021-11-24
Payer: MEDICAID

## 2021-11-24 DIAGNOSIS — Z12.11 SPECIAL SCREENING FOR MALIGNANT NEOPLASMS, COLON: Primary | ICD-10-CM

## 2021-11-24 DIAGNOSIS — Z01.818 PRE-OP TESTING: ICD-10-CM

## 2021-11-24 RX ORDER — SODIUM, POTASSIUM,MAG SULFATES 17.5-3.13G
1 SOLUTION, RECONSTITUTED, ORAL ORAL DAILY
Qty: 1 KIT | Refills: 0 | Status: SHIPPED | OUTPATIENT
Start: 2021-11-24 | End: 2021-11-26

## 2021-12-02 ENCOUNTER — TELEPHONE (OUTPATIENT)
Dept: NEUROLOGY | Facility: CLINIC | Age: 51
End: 2021-12-02
Payer: MEDICAID

## 2021-12-03 ENCOUNTER — TELEPHONE (OUTPATIENT)
Dept: NEUROLOGY | Facility: CLINIC | Age: 51
End: 2021-12-03
Payer: MEDICAID

## 2021-12-06 ENCOUNTER — OCCUPATIONAL HEALTH (OUTPATIENT)
Dept: URGENT CARE | Facility: CLINIC | Age: 51
End: 2021-12-06

## 2021-12-06 DIAGNOSIS — Z02.83 ENCOUNTER FOR DRUG SCREENING: Primary | ICD-10-CM

## 2021-12-06 DIAGNOSIS — Z11.1 PPD SCREENING TEST: ICD-10-CM

## 2021-12-06 LAB
CTP QC/QA: YES
POC 5 PANEL DRUG SCREEN: NEGATIVE

## 2021-12-06 PROCEDURE — 86580 TB INTRADERMAL TEST: CPT | Mod: S$GLB,,, | Performed by: NURSE PRACTITIONER

## 2021-12-06 PROCEDURE — 86580 POCT TB SKIN TEST: ICD-10-PCS | Mod: S$GLB,,, | Performed by: NURSE PRACTITIONER

## 2021-12-06 PROCEDURE — 80305 DRUG TEST PRSMV DIR OPT OBS: CPT | Mod: S$GLB,,, | Performed by: NURSE PRACTITIONER

## 2021-12-06 PROCEDURE — 80305 POCT RAPID DRUG SCREEN 5 PANEL: ICD-10-PCS | Mod: S$GLB,,, | Performed by: NURSE PRACTITIONER

## 2022-01-07 ENCOUNTER — TELEPHONE (OUTPATIENT)
Dept: VASCULAR SURGERY | Facility: CLINIC | Age: 52
End: 2022-01-07
Payer: MEDICAID

## 2022-01-07 NOTE — TELEPHONE ENCOUNTER
MARTINEZM for pt informing pt that her appt will have to be moved to 2:15 on Jan 24 or to a different day.

## 2022-01-12 DIAGNOSIS — Z01.818 PRE-OP TESTING: ICD-10-CM

## 2022-01-16 ENCOUNTER — LAB VISIT (OUTPATIENT)
Dept: PRIMARY CARE CLINIC | Facility: CLINIC | Age: 52
End: 2022-01-16
Payer: MEDICAID

## 2022-01-16 DIAGNOSIS — Z01.818 PRE-OP TESTING: ICD-10-CM

## 2022-01-16 PROCEDURE — U0005 INFEC AGEN DETEC AMPLI PROBE: HCPCS | Performed by: CLINICAL NURSE SPECIALIST

## 2022-01-16 PROCEDURE — U0003 INFECTIOUS AGENT DETECTION BY NUCLEIC ACID (DNA OR RNA); SEVERE ACUTE RESPIRATORY SYNDROME CORONAVIRUS 2 (SARS-COV-2) (CORONAVIRUS DISEASE [COVID-19]), AMPLIFIED PROBE TECHNIQUE, MAKING USE OF HIGH THROUGHPUT TECHNOLOGIES AS DESCRIBED BY CMS-2020-01-R: HCPCS | Performed by: CLINICAL NURSE SPECIALIST

## 2022-01-17 LAB
SARS-COV-2 RNA RESP QL NAA+PROBE: NOT DETECTED
SARS-COV-2- CYCLE NUMBER: NORMAL

## 2022-01-19 ENCOUNTER — ANESTHESIA (OUTPATIENT)
Dept: ENDOSCOPY | Facility: HOSPITAL | Age: 52
End: 2022-01-19
Payer: MEDICAID

## 2022-01-19 ENCOUNTER — ANESTHESIA EVENT (OUTPATIENT)
Dept: ENDOSCOPY | Facility: HOSPITAL | Age: 52
End: 2022-01-19
Payer: MEDICAID

## 2022-01-19 ENCOUNTER — HOSPITAL ENCOUNTER (OUTPATIENT)
Facility: HOSPITAL | Age: 52
Discharge: HOME | End: 2022-01-19
Attending: STUDENT IN AN ORGANIZED HEALTH CARE EDUCATION/TRAINING PROGRAM | Admitting: STUDENT IN AN ORGANIZED HEALTH CARE EDUCATION/TRAINING PROGRAM
Payer: MEDICAID

## 2022-01-19 VITALS
DIASTOLIC BLOOD PRESSURE: 65 MMHG | SYSTOLIC BLOOD PRESSURE: 137 MMHG | WEIGHT: 227 LBS | HEART RATE: 77 BPM | TEMPERATURE: 97 F | OXYGEN SATURATION: 98 % | BODY MASS INDEX: 41.77 KG/M2 | HEIGHT: 62 IN | RESPIRATION RATE: 16 BRPM

## 2022-01-19 DIAGNOSIS — Z12.11 COLON CANCER SCREENING: ICD-10-CM

## 2022-01-19 PROCEDURE — 45380 COLONOSCOPY AND BIOPSY: CPT | Mod: ,,, | Performed by: STUDENT IN AN ORGANIZED HEALTH CARE EDUCATION/TRAINING PROGRAM

## 2022-01-19 PROCEDURE — 45380 PR COLONOSCOPY,BIOPSY: ICD-10-PCS | Mod: ,,, | Performed by: STUDENT IN AN ORGANIZED HEALTH CARE EDUCATION/TRAINING PROGRAM

## 2022-01-19 PROCEDURE — E9220 PRA ENDO ANESTHESIA: HCPCS | Mod: ,,, | Performed by: NURSE ANESTHETIST, CERTIFIED REGISTERED

## 2022-01-19 PROCEDURE — 25000003 PHARM REV CODE 250: Performed by: NURSE ANESTHETIST, CERTIFIED REGISTERED

## 2022-01-19 PROCEDURE — E9220 PRA ENDO ANESTHESIA: ICD-10-PCS | Mod: ,,, | Performed by: NURSE ANESTHETIST, CERTIFIED REGISTERED

## 2022-01-19 PROCEDURE — 00813 ANES UPR LWR GI NDSC PX: CPT | Performed by: STUDENT IN AN ORGANIZED HEALTH CARE EDUCATION/TRAINING PROGRAM

## 2022-01-19 PROCEDURE — 88305 TISSUE EXAM BY PATHOLOGIST: CPT | Performed by: PATHOLOGY

## 2022-01-19 PROCEDURE — 37000009 HC ANESTHESIA EA ADD 15 MINS: Performed by: STUDENT IN AN ORGANIZED HEALTH CARE EDUCATION/TRAINING PROGRAM

## 2022-01-19 PROCEDURE — 88305 TISSUE EXAM BY PATHOLOGIST: CPT | Mod: 26,,, | Performed by: PATHOLOGY

## 2022-01-19 PROCEDURE — 63600175 PHARM REV CODE 636 W HCPCS: Performed by: NURSE ANESTHETIST, CERTIFIED REGISTERED

## 2022-01-19 PROCEDURE — 88305 TISSUE EXAM BY PATHOLOGIST: ICD-10-PCS | Mod: 26,,, | Performed by: PATHOLOGY

## 2022-01-19 PROCEDURE — 37000008 HC ANESTHESIA 1ST 15 MINUTES: Performed by: STUDENT IN AN ORGANIZED HEALTH CARE EDUCATION/TRAINING PROGRAM

## 2022-01-19 PROCEDURE — 45380 COLONOSCOPY AND BIOPSY: CPT | Performed by: STUDENT IN AN ORGANIZED HEALTH CARE EDUCATION/TRAINING PROGRAM

## 2022-01-19 PROCEDURE — 27201012 HC FORCEPS, HOT/COLD, DISP: Performed by: STUDENT IN AN ORGANIZED HEALTH CARE EDUCATION/TRAINING PROGRAM

## 2022-01-19 RX ORDER — SODIUM CHLORIDE 9 MG/ML
INJECTION, SOLUTION INTRAVENOUS CONTINUOUS
Status: DISCONTINUED | OUTPATIENT
Start: 2022-01-19 | End: 2022-01-19 | Stop reason: HOSPADM

## 2022-01-19 RX ORDER — LIDOCAINE HCL/PF 100 MG/5ML
SYRINGE (ML) INTRAVENOUS
Status: DISCONTINUED | OUTPATIENT
Start: 2022-01-19 | End: 2022-01-19

## 2022-01-19 RX ORDER — PROPOFOL 10 MG/ML
VIAL (ML) INTRAVENOUS CONTINUOUS PRN
Status: DISCONTINUED | OUTPATIENT
Start: 2022-01-19 | End: 2022-01-19

## 2022-01-19 RX ORDER — PROPOFOL 10 MG/ML
VIAL (ML) INTRAVENOUS
Status: DISCONTINUED | OUTPATIENT
Start: 2022-01-19 | End: 2022-01-19

## 2022-01-19 RX ADMIN — SODIUM CHLORIDE: 9 INJECTION, SOLUTION INTRAVENOUS at 10:01

## 2022-01-19 RX ADMIN — Medication 50 MG: at 10:01

## 2022-01-19 RX ADMIN — PROPOFOL 80 MG: 10 INJECTION, EMULSION INTRAVENOUS at 10:01

## 2022-01-19 RX ADMIN — Medication 150 MCG/KG/MIN: at 10:01

## 2022-01-19 NOTE — ANESTHESIA POSTPROCEDURE EVALUATION
Anesthesia Post Evaluation    Patient: Kathrine Ashraf    Procedure(s) Performed: Procedure(s) (LRB):  COLONOSCOPY (N/A)    Final Anesthesia Type: general      Patient location during evaluation: GI PACU  Patient participation: Yes- Able to Participate  Level of consciousness: awake and alert, awake and oriented  Post-procedure vital signs: reviewed and stable  Pain management: adequate  Airway patency: patent    PONV status at discharge: No PONV  Anesthetic complications: no      Cardiovascular status: stable  Respiratory status: unassisted, spontaneous ventilation and room air  Hydration status: euvolemic  Follow-up not needed.          Vitals Value Taken Time   /65 01/19/22 1132   Temp 36.3 °C (97.3 °F) 01/19/22 1105   Pulse 77 01/19/22 1132   Resp 16 01/19/22 1132   SpO2 98 % 01/19/22 1132         Event Time   Out of Recovery 11:40:55         Pain/Torie Score: Torie Score: 9 (1/19/2022 11:05 AM)

## 2022-01-19 NOTE — ANESTHESIA PREPROCEDURE EVALUATION
01/19/2022  Kathrine Ashraf is a 51 y.o., female.  Past Medical History:   Diagnosis Date    Allergy     Anxiety      Past Surgical History:   Procedure Laterality Date    HYSTERECTOMY      partial    TUBAL LIGATION           Anesthesia Evaluation    I have reviewed the Patient Summary Reports.   I have reviewed the NPO Status.   I have reviewed the Medications.     Review of Systems  Anesthesia Hx:  No problems with previous Anesthesia    Social:  Non-Smoker    Hematology/Oncology:  Hematology Normal   Oncology Normal     EENT/Dental:EENT/Dental Normal   Cardiovascular:  Cardiovascular Normal     Pulmonary:  Pulmonary Normal    Renal/:  Renal/ Normal     Hepatic/GI:  Hepatic/GI Normal Bowel Prep.    Musculoskeletal:  Musculoskeletal Normal    Neurological:   Seizures Reports that mother told her she had a seizure at age 5, never on anti-seizure medication    Endocrine:  Endocrine Normal    Dermatological:  Skin Normal    Psych:   anxiety          Physical Exam  General:  Well nourished    Airway/Jaw/Neck:  Airway Findings: Mouth Opening: Normal Tongue: Normal  General Airway Assessment: Adult  Mallampati: II  Improves to I with phonation.  TM Distance: Normal, at least 6 cm  Jaw/Neck Findings:     Neck ROM: Normal ROM      Dental:  Dental Findings: In tact        Mental Status:  Mental Status Findings:  Cooperative, Alert and Oriented         Anesthesia Plan  Type of Anesthesia, risks & benefits discussed:  Anesthesia Type:  MAC, general    Patient's Preference:   Plan Factors:          Intra-op Monitoring Plan: standard ASA monitors  Intra-op Monitoring Plan Comments:   Post Op Pain Control Plan:   Post Op Pain Control Plan Comments:     Induction:   IV  Beta Blocker:  Patient is not currently on a Beta-Blocker (No further documentation required).       Informed Consent: Patient understands risks  and agrees with Anesthesia plan.  Questions answered. Anesthesia consent signed with patient.  ASA Score: 2     Day of Surgery Review of History & Physical: I have interviewed and examined the patient. I have reviewed the patient's H&P dated:  There are no significant changes.  H&P update referred to the provider.         Ready For Surgery From Anesthesia Perspective.

## 2022-01-19 NOTE — PROVATION PATIENT INSTRUCTIONS
Discharge Summary/Instructions after an Endoscopic Procedure  Patient Name: Kathrine Ashraf  Patient MRN: [id number]  Patient YOB: 1970 Wednesday, January 19, 2022  Franky Murcia MD  Dear patient,  As a result of recent federal legislation (The Federal Cures Act), you may   receive lab or pathology results from your procedure in your [facility]   account before your physician is able to contact you. Your physician or   their representative will relay the results to you with their   recommendations at their soonest availability.  Thank you,  RESTRICTIONS:  During your procedure today, you received medications for sedation.  These   medications may affect your judgment, balance and coordination.  Therefore,   for 24 hours, you have the following restrictions:   - DO NOT drive a car, operate machinery, make legal/financial decisions,   sign important papers or drink alcohol.    ACTIVITY:  Today: no heavy lifting, straining or running due to procedural   sedation/anesthesia.  The following day: return to full activity including work.  DIET:  Eat and drink normally unless instructed otherwise.     TREATMENT FOR COMMON SIDE EFFECTS:  - Mild abdominal pain, nausea, belching, bloating or excessive gas:  rest,   eat lightly and use a heating pad.  - Sore Throat: treat with throat lozenges and/or gargle with warm salt   water.  - Because air was used during the procedure, expelling large amounts of air   from your rectum or belching is normal.  - If a bowel prep was taken, you may not have a bowel movement for 1-3 days.    This is normal.  SYMPTOMS TO WATCH FOR AND REPORT TO YOUR PHYSICIAN:  1. Abdominal pain or bloating, other than gas cramps.  2. Chest pain.  3. Back pain.  4. Signs of infection such as: chills or fever occurring within 24 hours   after the procedure.  5. Rectal bleeding, which would show as bright red, maroon, or black stools.   (A tablespoon of blood from the rectum is not serious, especially  if   hemorrhoids are present.)  6. Vomiting.  7. Weakness or dizziness.  GO DIRECTLY TO THE NEAREST EMERGENCY ROOM IF YOU HAVE ANY OF THE FOLLOWING:      Difficulty breathing              Chills and/or fever over 101 F   Persistent vomiting and/or vomiting blood   Severe abdominal pain   Severe chest pain   Black, tarry stools   Bleeding- more than one tablespoon   Any other symptom or condition that you feel may need urgent attention  Your doctor recommends these additional instructions:  If any biopsies were taken, your doctors clinic will contact you in 1 to 2   weeks with any results.  - Discharge patient to home.   - Resume previous diet.   - Continue present medications.   - Await pathology results.   - Repeat colonoscopy in 7 years for screening purposes.   - Return to referring physician as previously scheduled.  For questions, problems or results please call your physician - Franky Murcia MD at Work:  [phone].  [facility] EMERGENCY ROOM PHONE NUMBER: [phone]  IF A COMPLICATION OR EMERGENCY SITUATION ARISES AND YOU ARE UNABLE TO REACH   YOUR PHYSICIAN - GO DIRECTLY TO THE EMERGENCY ROOM.  MD Franky Lyn MD  1/19/2022 11:03:07 AM  This report has been verified and signed electronically.  Dear patient,  As a result of recent federal legislation (The Federal Cures Act), you may   receive lab or pathology results from your procedure in your [facility]   account before your physician is able to contact you. Your physician or   their representative will relay the results to you with their   recommendations at their soonest availability.  Thank you,  PROVATION

## 2022-01-19 NOTE — H&P
ENDOSCOPY H&P    Procedure : screening colonoscopy    asymptomatic screening exam  No prior colonoscopy    No FH of colon or rectal cancer    Past Medical History:   Diagnosis Date    Allergy     Anxiety      Sedation Problems: NO  Family History   Problem Relation Age of Onset    Hypertension Mother     Cancer Father     Breast cancer Maternal Aunt     Ovarian cancer Maternal Aunt     Melanoma Neg Hx      Fam Hx of Sedation Problems: NO  Social History     Socioeconomic History    Marital status:    Tobacco Use    Smoking status: Never Smoker    Smokeless tobacco: Never Used   Substance and Sexual Activity    Alcohol use: Not Currently     Comment: Socially    Drug use: Never    Sexual activity: Yes     Partners: Male   Other Topics Concern    Are you pregnant or think you may be? No       Review of Systems - Negative    Respiratory ROS: no cough, shortness of breath, or wheezing  Cardiovascular ROS: no chest pain or dyspnea on exertion  Gastrointestinal ROS: no abdominal pain, change in bowel habits, or black or bloody stools  Musculoskeletal ROS: negative  Neurological ROS: no TIA or stroke symptoms    Physical Exam:  General: no distress  Head: normocephalic  Airway:  normal oropharynx, airway normal  Neck: supple, symmetrical, trachea midline  Lungs:  clear to auscultation bilaterally and normal respiratory effort  Heart: regular rate and rhythm, S1, S2 normal, no murmur, rub or gallop  Abdomen: soft, non-tender non-distented; bowel sounds normal; no masses,  no organomegaly  Extremities: no cyanosis or edema, or clubbing    Deep Sedation: Mallampati Score per anesthesia     SedationPlan :Choice     ASA : II

## 2022-01-19 NOTE — TRANSFER OF CARE
"Anesthesia Transfer of Care Note    Patient: Kathrine Ashraf    Procedure(s) Performed: Procedure(s) (LRB):  COLONOSCOPY (N/A)    Patient location: GI    Anesthesia Type: general    Transport from OR: Transported from OR on room air with adequate spontaneous ventilation    Post pain: adequate analgesia    Post assessment: no apparent anesthetic complications and tolerated procedure well    Post vital signs: stable    Level of consciousness: sedated    Nausea/Vomiting: no nausea/vomiting    Complications: none    Transfer of care protocol was followed      Last vitals:   Visit Vitals  /76 (BP Location: Left arm, Patient Position: Lying)   Pulse (!) 56   Temp 36.3 °C (97.3 °F) (Temporal)   Resp 19   Ht 5' 2" (1.575 m)   Wt 103 kg (227 lb)   LMP 01/01/2012   SpO2 96%   Breastfeeding No   BMI 41.52 kg/m²     "
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
## 2022-01-26 ENCOUNTER — OFFICE VISIT (OUTPATIENT)
Dept: NEUROLOGY | Facility: CLINIC | Age: 52
End: 2022-01-26
Payer: MEDICAID

## 2022-01-26 VITALS
SYSTOLIC BLOOD PRESSURE: 103 MMHG | HEIGHT: 62 IN | DIASTOLIC BLOOD PRESSURE: 70 MMHG | WEIGHT: 234.56 LBS | BODY MASS INDEX: 43.16 KG/M2 | HEART RATE: 78 BPM

## 2022-01-26 DIAGNOSIS — M54.32 SCIATICA OF LEFT SIDE: ICD-10-CM

## 2022-01-26 DIAGNOSIS — R51.9 CHRONIC DAILY HEADACHE: ICD-10-CM

## 2022-01-26 DIAGNOSIS — G47.33 OBSTRUCTIVE SLEEP APNEA SYNDROME: Primary | ICD-10-CM

## 2022-01-26 DIAGNOSIS — R06.83 SNORING: ICD-10-CM

## 2022-01-26 DIAGNOSIS — G44.229 CHRONIC TENSION-TYPE HEADACHE, NOT INTRACTABLE: ICD-10-CM

## 2022-01-26 LAB
FINAL PATHOLOGIC DIAGNOSIS: NORMAL
GROSS: NORMAL
Lab: NORMAL

## 2022-01-26 PROCEDURE — 99999 PR PBB SHADOW E&M-EST. PATIENT-LVL IV: ICD-10-PCS | Mod: PBBFAC,,, | Performed by: STUDENT IN AN ORGANIZED HEALTH CARE EDUCATION/TRAINING PROGRAM

## 2022-01-26 PROCEDURE — 99999 PR PBB SHADOW E&M-EST. PATIENT-LVL IV: CPT | Mod: PBBFAC,,, | Performed by: STUDENT IN AN ORGANIZED HEALTH CARE EDUCATION/TRAINING PROGRAM

## 2022-01-26 PROCEDURE — 99214 OFFICE O/P EST MOD 30 MIN: CPT | Mod: PBBFAC | Performed by: STUDENT IN AN ORGANIZED HEALTH CARE EDUCATION/TRAINING PROGRAM

## 2022-01-26 PROCEDURE — 99214 OFFICE O/P EST MOD 30 MIN: CPT | Mod: S$PBB,,, | Performed by: STUDENT IN AN ORGANIZED HEALTH CARE EDUCATION/TRAINING PROGRAM

## 2022-01-26 PROCEDURE — 99214 PR OFFICE/OUTPT VISIT, EST, LEVL IV, 30-39 MIN: ICD-10-PCS | Mod: S$PBB,,, | Performed by: STUDENT IN AN ORGANIZED HEALTH CARE EDUCATION/TRAINING PROGRAM

## 2022-01-26 NOTE — ASSESSMENT & PLAN NOTE
- Patient refers morning headaches that improve throughout the day with no migrainous features   - Has fioricet prescribed as PRN, instructed not to take >2 in a week   - Patient is not interested in starting new medications   - High suspicion of headache being 2/2 JOSE MARIA, referral to sleep clinic sent

## 2022-01-26 NOTE — ASSESSMENT & PLAN NOTE
- Description of sensory changes suggestive of sciatica. Patient does not have any pain or weakness. No need to get imaging at this time   - Patient is overweight which puts her at higher risk   - Handout of stretching exercises for sciatica provided as well as for nutrition/diet   - If symptoms get worse or patient develops any new neurological deficits then will consider imaging.   - Will keep open appointment

## 2022-01-26 NOTE — PATIENT INSTRUCTIONS
Patient Education       Back Flexion Strengthening Exercises   About this topic   Back pain is a common problem for adults. Different exercises may help to lessen pain. One kind of exercise that may help is back flexion strengthening exercises. Back flexion means the back is bending forward. Based on the cause of your back pain, these exercises could make some back problems worse.  General   Before starting with a program, ask your doctor if you are healthy enough to do these exercises. Your doctor may have you work with a , chiropractor or physical therapist to make a safe exercise program to meet your needs.  Strengthening Exercises   Strengthening exercises keep your muscles firm and strong. Start by repeating each exercise 2 to 3 times. Work up to doing each exercise 10 times. Try to do the exercises 2 to 3 times each day. Hold each exercise for 3 to 5 seconds. Do all exercises slowly.  · Pelvic tilts ? Lie on your back with your knees bent and feet flat on the floor. Tighten your stomach muscles and press your lower back down to the floor. Relax.  · Straight leg raises lying down ? Lie on your back and tighten your stomach muscles. Keep lower back down or flat on the floor or bed. Straighten one leg and bend your other knee so the foot is flat on the bed. Keeping your leg straight, lift the leg up to the level of your other knee. Lower it back down. Repeat with the other leg.  · Knee flex lying down ? Lie on your back and tighten your stomach muscles. Keep lower back down or flat on the floor. Bend both knees and place your feet flat on the floor. Raise one leg up and back down as if you are marching in slow motion. Switch legs. To make this exercise harder, raise both arms straight up in the air. Tighten your belly muscles. When you raise one leg up, reach the opposite arm over your head. Switch, moving the opposite arm and leg until you have done 10 repetitions on each side.  · Abdominal crunches ? Lie  on your back with both knees bent. Keep your feet flat on the floor. Place your hands in one of these positions. Try starting with the first position since it is the easiest. As you get better, use the other positions to make it harder.  ? Crunches with arms at sides.  ? Crunches with arms across chest.  ? Crunches with arms behind head. Be careful not to interlock your fingers behind your neck or head while doing crunches. This may add tension to your neck and cause strain.  Look at the ceiling. Tighten your belly muscles and lift your shoulders and upper back off the floor. Breathe out while you are doing this. Lower your shoulders to the floor. Breathe in while you are doing this. Relax your belly muscles all the way before starting another crunch.  · Squats ? Stand with your feet spread shoulder width apart. Bend your knees while keeping your back straight. Go as far as you feel comfortable. The deeper you bend, the harder it is. Put your arms straight out in front for balance as you bend more. To make it harder, hold this position longer. Stand up and repeat. If you have knee problems, only squat down until your thighsare parallelor levelwith the floor.             What will the results be?   · Better strength and flexibility  · Less back pain  · Less back spasms  · Easier to walk and do other activities  · Less leg pain, numbness, and tingling  Helpful tips   · Stay active and work out to keep your muscles strong and flexible.  · Keep a healthy weight to avoid putting too much stress on your spine. Eat a healthy diet to keep your muscles healthy.  · Be sure you do not hold your breath when exercising. This can raise your blood pressure. If you tend to hold your breath, try counting out loud when exercising. If any exercise bothers you, stop right away.  · Try walking or cycling at an easy pace for a few minutes to warm up your muscles. Do this again after exercising.  · Exercise may be slightly uncomfortable,  but you should not have sharp pains. If you do get sharp pains, stop what you are doing. If the sharp pains continue, call your doctor.  Where can I learn more?   [employer]  http://orthoinfo.aaos.org/topic.cfm?mpyzq=N24180   Last Reviewed Date   2021-03-18  Consumer Information Use and Disclaimer   This information is not specific medical advice and does not replace information you receive from your health care provider. This is only a brief summary of general information. It does NOT include all information about conditions, illnesses, injuries, tests, procedures, treatments, therapies, discharge instructions or life-style choices that may apply to you. You must talk with your health care provider for complete information about your health and treatment options. This information should not be used to decide whether or not to accept your health care providers advice, instructions or recommendations. Only your health care provider has the knowledge and training to provide advice that is right for you.  Copyright   Copyright © 2021 UpToDate, Inc. and its affiliates and/or licensors. All rights reserved.  Patient Education       Sciatica Exercises   About this topic   Sciatica is pain, weakness, numbness, or tingling that runs from your buttocks down the back of your leg to your feet. It happens when something is pressing on, or bothering, the sciatic nerve. This large nerve starts in your lower back. It runs all the way down the back of your leg to your foot.  The exercises for sciatica may be different based on the cause of your pain. Exercise may be slightly uncomfortable, but you should not have sharp pains. If you do get sharp pains, stop what you are doing. If the sharp pains continue, call your doctor.  General   Before starting with a program, ask your doctor if you are healthy enough to do these exercises. Your doctor may have you work with a chiropractor, , or physical  therapist to make a safe exercise program to meet your needs.  Stretching Exercises   Stretching exercises keep your muscles flexible. They also stop them from getting tight. Start by doing each of these stretches 2 to 3 times. In order for your body to make changes, you will need to hold these stretches for 20 to 30 seconds. Try to do the stretches 2 to 3 times each day. Do all exercises slowly.  · Single knee to chest stretches ? Lie on your back. Pull one knee towards your chest until you feel a stretch in your lower back and buttock area. Repeat with the other knee. If you have knee problems, pull your knee up by grabbing the back of your thigh instead of the front of your knee. You can also do this exercise by grabbing both knees at the same time.  · Deep hip stretches lying down ? Lie on your back and bend one knee, keeping that foot flat on the floor. Cross the other leg over your knee. Grab the thigh of the leg that has the foot on the floor. Slowly, pull the bottom leg towards your chest until you feel a stretch in the other buttock. Repeat using the opposite leg as the bottom leg.  · Deep hip stretches sitting ? Sit on the floor with both legs straight. Take one leg and cross it over the other leg so that the foot of your top leg is next to your outer knee. Now, take the elbow on the opposite side of your bent knee and bring it to the outside of the bent knee. With your elbow, slowly push the bent knee further across the body to get a good stretch in the hip.  Strengthening Exercises   Strengthening exercises keep your muscles firm and strong. Start by repeating each exercise 2 to 3 times. Work up to doing each exercise 10 times. Try to do the exercises 2 to 3 times each day. Hold each exercise for 3 to 5 seconds. Do all exercises slowly.  · Hip lifts ? Lie on your back with your knees bent and feet flat on the floor. Tighten your stomach muscles and lift your buttocks off the floor. Relax.  · Arm and leg  lifts on hands and knees ? Start on your hands and knees. With all of these exercises, keep your back as level as possible. If you are having trouble with this, you may want to put a small object on your back such as a book. If it falls off, you are not keeping your back level enough during the exercise.  ? Lift one arm up to shoulder level and hold. Lower it back down. Now, lift up the other arm and hold.  ? Lift one leg up and kick it straight out until it is in line with your back and hold. Lower it back down. Now, lift up the other leg and hold.  ? Lift one arm and the OPPOSITE leg up at the same time and hold. Lower them down. Now, repeat using the other arm and leg. This is a very hard exercise. It may take time to be able to do this.             What will the results be?   · Better flexibility  · Less pain  · Less numbness and tingling  · More leg strength  · Easier to walk and do other activities  · Increased core strength  Helpful tips   · Stay active and work out to keep your muscles strong and flexible.  · Keep a healthy weight to avoid putting too much stress on your spine. Eat a healthy diet to keep your muscles healthy.  · Be sure you do not hold your breath when exercising. This can raise your blood pressure. If you tend to hold your breath, try counting out loud when exercising. If any exercise bothers you, stop right away.  · Always warm up before stretching. Heated muscles stretch much easier than cool muscles. Stretching cool muscles can lead to injury.  · Try walking or cycling at an easy pace for a few minutes to warm up your muscles. Do this again after exercising.  · Never bounce when doing stretches.  · Apply ice to the low back on the side of leg pain.  · Doing exercises before a meal may be a good way to get into a routine.  Where can I learn more?   [facility] Choices  http://www.nhs.uk/[facility]/Backpain/Pages/sciatica-exercises.aspx   Last Reviewed Date   2021-08-30  Consumer Information Use and  Disclaimer   This information is not specific medical advice and does not replace information you receive from your health care provider. This is only a brief summary of general information. It does NOT include all information about conditions, illnesses, injuries, tests, procedures, treatments, therapies, discharge instructions or life-style choices that may apply to you. You must talk with your health care provider for complete information about your health and treatment options. This information should not be used to decide whether or not to accept your health care providers advice, instructions or recommendations. Only your health care provider has the knowledge and training to provide advice that is right for you.  Copyright   Copyright © 2021 UpToDate, Inc. and its affiliates and/or licensors. All rights reserved.  Patient Education       DASH Diet   About this topic   DASH stands for Dietary Approaches to Stop Hypertension. The DASH diet may help you lower blood pressure. It may also help keep you from getting high blood pressure. You will eat less fat and more fiber on the DASH diet.  This diet gives you more minerals that fight high blood pressure. Some nutrients in this diet are:  · Potassium ? Acts to help you get rid of salt. This may help to lower blood pressure.  · Calcium ? Makes blood vessels and muscles work the right way  · Magnesium - Helps blood vessels relax  · Fiber ? Helps you feel full. It also helps digestion.  What will the results be?   The DASH diet may help you:  · Lower your blood pressure and cholesterol  · Lower your risk for cancer, heart disease, heart attack, and stroke. It may also lower your risk for heart failure, kidney stones, and diabetes.  · Lose weight or keep a healthy weight  What lifestyle changes are needed?   · Add regular exercise to get the most help from this diet.  · Try to lower stress. Find ways to relax.  · Stop smoking. Avoid secondhand smoke.  · Limit alcohol  intake.  What changes to diet are needed?   · Know about poor eating habits. Then, you can fix them as you work with the program.  · This diet encourages fruits and vegetables, whole grains, lean meats, healthy fats, and low-fat or fat-free dairy products.  · This diet is lower in saturated fats, trans-fats, cholesterol, added sugars, and sodium.  Who should use this diet?   This eating plan is good for the whole family. It is also good for people with high blood pressure and those at risk for high blood pressure.  What foods are good to eat?   · Grains: Try to eat 6 to 8 servings of whole grain, high fiber foods each day. These are bread, cereals, brown rice, or pasta.  · Fruits and vegetables: Eat 4 to 5 servings each day. Try to pick many kinds and colors. Fresh or frozen are best. Look for low sodium or salt-free if you choose canned.  · Dairy: Try to eat 2 to 3 servings of fat free and low fat milk products each day.  · Lean meats, poultry, and seafood: Try to eat 6 servings or less of lean meats, poultry, and seafood each day. Try to choose more low fat or lean meats like chicken and turkey. Eat less red meat. Eat more fish instead.  · Nuts, seeds, and legumes (dry beans and peas): Try to eat 4 to 5 servings each week. Try to pick nuts such as almonds and walnuts, sunflower seeds, peanut butter, soy beans, lentils, kidney beans, and split peas.  · Fats and oils: Try to eat 2 to 3 servings of fats and oils each day. Eat good fats found in fish, nuts, and avocados. Try using olive oil or vegetable oils such as canola oil. Other good oils to try are corn, safflower, sunflower, or soybean oils. Use low-sodium and low-fat salad dressing and mayonnaise.  · Condiments: Pepper, herbs, spices, vinegar, lemon or lime juices are great for seasoning. Be careful to choose low-sodium or salt-free products if you use broths, soups, or soy sauce.  · Sweets: Try to eat less than 5 servings each week. Choose low-fat and trans  fat-free desserts. These are things like fruit flavored gelatin, sorbet, jelly beans, stephen crackers, animal crackers, low-fat fig bars, and laisha snaps. Eat fruit to satisfy your desire for sweets.     What foods should be limited or avoided?   · Grains: Salted breads, rolls, crackers, quick breads, self-rising flours, biscuit mixes, regular bread crumbs, instant hot cereals, commercially-prepared rice, pasta, stuffing mixes  · Fruits and vegetables: Commercially-prepared potatoes and vegetable mixes, regular canned vegetables and juices, vegetables frozen with sauce or pickled vegetables, processed fruits with salt or sodium  · Milk: Whole milk, malted milk, chocolate milk, buttermilk, cheese, ice cream  · Meats and beans: Smoked, cured, salted, or canned fish; meats or poultry such as evans, sausages, sardines; high-fat cuts of meat like beef, lamb, or pork; chicken with the skin on it  · Fats: Cut back on solid fats like butter, lard, and margarine. Eat less food with high saturated fat, cholesterol and total fat.  · Condiments and snacks: Salted and canned peas, beans, and olives; salted snack foods; fried foods; soda or other sweetened drinks  · Sweets: High-fat baked goods such as muffins, donuts, pastries, commercial baked goods, candy bars  · If you choose to drink alcohol, limit the amount you drink. Women should have 1 drink or less per day and men should have 2 drinks or less per day.  Helpful tips   · Avoid eating canned vegetables and processed foods. These have a lot of salt in them. Look for a low-salt or low-sodium choice.  · Try baking or broiling instead of frying food.  · Write down the foods you eat. This will help you track what you have eaten each week.  · When you go to a grocery store, have a list or a meal plan. Do not shop when you are hungry to avoid cravings for foods.  · Read food labels with care. They will show you how much is in a serving. The amount is given as a percentage of the  total amount you need each day. Reading labels will help you make healthy food choices.       · Avoid fast foods.  · Talk to your doctor or dietitian to see if you need vitamin and mineral supplements to help you balance your diet.  · Talk to a dietitian for help.  Where can I learn more?   Academy of Nutrition and Dietetics  https://www.eatright.org/health/wellness/heart-and-cardiovascular-health/dash-diet-reducing-hypertension-through-diet-and-lifestyle   FamilyDoctor.org  http://familydoctor.org/familydoctor/en/prevention-wellness/food-nutrition/weight-loss/the-dash-diet-healthy-eating-to-control-your-blood-pressure.html   Last Reviewed Date   2021-03-15  Consumer Information Use and Disclaimer   This information is not specific medical advice and does not replace information you receive from your health care provider. This is only a brief summary of general information. It does NOT include all information about conditions, illnesses, injuries, tests, procedures, treatments, therapies, discharge instructions or life-style choices that may apply to you. You must talk with your health care provider for complete information about your health and treatment options. This information should not be used to decide whether or not to accept your health care providers advice, instructions or recommendations. Only your health care provider has the knowledge and training to provide advice that is right for you.  Copyright   Copyright © 2021 UpToDate, Inc. and its affiliates and/or licensors. All rights reserved.

## 2022-01-26 NOTE — ASSESSMENT & PLAN NOTE
- Patient has features and risk factors concerning for JOSE MARIA, including interrupted sleep, severe snoring, lack of rest, morning headaches, obesity   - Referral to sleep clinic sent

## 2022-01-31 ENCOUNTER — OFFICE VISIT (OUTPATIENT)
Dept: VASCULAR SURGERY | Facility: CLINIC | Age: 52
End: 2022-01-31
Payer: MEDICAID

## 2022-01-31 VITALS — SYSTOLIC BLOOD PRESSURE: 138 MMHG | HEART RATE: 93 BPM | DIASTOLIC BLOOD PRESSURE: 71 MMHG

## 2022-01-31 DIAGNOSIS — I83.813 VARICOSE VEINS OF BOTH LOWER EXTREMITIES WITH PAIN: ICD-10-CM

## 2022-01-31 DIAGNOSIS — I87.2 VENOUS INSUFFICIENCY: Primary | ICD-10-CM

## 2022-01-31 PROCEDURE — 99214 PR OFFICE/OUTPT VISIT, EST, LEVL IV, 30-39 MIN: ICD-10-PCS | Mod: S$GLB,,, | Performed by: SURGERY

## 2022-01-31 PROCEDURE — 3075F PR MOST RECENT SYSTOLIC BLOOD PRESS GE 130-139MM HG: ICD-10-PCS | Mod: CPTII,S$GLB,, | Performed by: SURGERY

## 2022-01-31 PROCEDURE — 1159F MED LIST DOCD IN RCRD: CPT | Mod: CPTII,S$GLB,, | Performed by: SURGERY

## 2022-01-31 PROCEDURE — 3078F DIAST BP <80 MM HG: CPT | Mod: CPTII,S$GLB,, | Performed by: SURGERY

## 2022-01-31 PROCEDURE — 3078F PR MOST RECENT DIASTOLIC BLOOD PRESSURE < 80 MM HG: ICD-10-PCS | Mod: CPTII,S$GLB,, | Performed by: SURGERY

## 2022-01-31 PROCEDURE — 1159F PR MEDICATION LIST DOCUMENTED IN MEDICAL RECORD: ICD-10-PCS | Mod: CPTII,S$GLB,, | Performed by: SURGERY

## 2022-01-31 PROCEDURE — 3075F SYST BP GE 130 - 139MM HG: CPT | Mod: CPTII,S$GLB,, | Performed by: SURGERY

## 2022-01-31 PROCEDURE — 99214 OFFICE O/P EST MOD 30 MIN: CPT | Mod: S$GLB,,, | Performed by: SURGERY

## 2022-01-31 NOTE — PROGRESS NOTES
Jamir Workman MD, RPVI                                 [facility]                           [facility]                             2022    HPI:  Kathrine Ashraf is a 51 y.o. female with   Patient Active Problem List   Diagnosis    Anxiety    Attention deficit disorder (ADD) in adult    Anxiety and depression    Chronic headache disorder    Impingement syndrome of left shoulder    Sciatica of left side    Snoring    being managed by PCP and specialists who is here today for evaluation of BLE pain.  Patient states location is BLE occurring for yrs.  Associated signs and symptoms include L shoulder pain and spider veins.  Quality is sharp and severity is 6/10.  Symptoms began yrs ago, recently worsening.  Alleviating factors include elevation.  Worsening factors include dependency.  Patient is not wearing compression stockings daily.  No FH of venous disease.  Symptoms do limit patient's functional status and daily activities.  No DVT history.  No venous interventions.  No low sodium diet.  No excessive water intake.    Migraine with aura: no  PFO/ASD/right to left shunt: no  Pregnant: no  Breastfeeding: no    no MI  no Stroke  Tobacco use: no    2022:  RLE pain improved.  L posterior knee painful intermittently, has not worn compression yet.    Past Medical History:   Diagnosis Date    Allergy     Anxiety      Past Surgical History:   Procedure Laterality Date     SECTION      COLONOSCOPY N/A 2022    Procedure: COLONOSCOPY;  Surgeon: Franky Murcia MD;  Location: [facility] ([address] [place] [address]);  Service: Endoscopy;  Laterality: N/A;  -[facility] portal-tb    HYSTERECTOMY      partial    TUBAL LIGATION       Family History   Problem Relation Age of Onset    Hypertension Mother     Cancer Father     Breast cancer Maternal Aunt     Ovarian cancer Maternal Aunt     Melanoma Neg Hx      Social History     Socioeconomic History    Marital  status:    Tobacco Use    Smoking status: Never Smoker    Smokeless tobacco: Never Used   Substance and Sexual Activity    Alcohol use: Not Currently     Comment: Socially    Drug use: Never    Sexual activity: Yes     Partners: Male   Other Topics Concern    Are you pregnant or think you may be? No       Current Outpatient Medications:     ibuprofen (ADVIL,MOTRIN) 800 MG tablet, Take 1 tablet (800 mg total) by mouth every meal as needed for Pain. Take with food, Disp: 20 tablet, Rfl: 0    multivitamin capsule, Take 1 capsule by mouth once daily., Disp: , Rfl:     paroxetine (PAXIL) 10 MG tablet, Take 1 tablet (10 mg total) by mouth every morning., Disp: 90 tablet, Rfl: 3    REVIEW OF SYSTEMS:  General: No fevers or chills; ENT: No sore throat; Allergy and Immunology: no persistent infections; Hematological and Lymphatic: No history of bleeding or easy bruising; Endocrine: negative; Respiratory: no cough, shortness of breath, or wheezing; Cardiovascular: no chest pain or dyspnea on exertion; Gastrointestinal: no abdominal pain/back, change in bowel habits, or bloody stools; Genito-Urinary: no dysuria, trouble voiding, or hematuria; Musculoskeletal: pain; Neurological: no TIA or stroke symptoms; Psychiatric: no nervousness, anxiety or depression.    PHYSICAL EXAM:      Pulse: 93         General appearance:  Alert, well-appearing, and in no distress.  Oriented to person, place, and time                    Neurological: Normal speech, no focal findings noted; CN II - XII grossly intact. RLE with sensation to light touch, LLE with sensation to light touch.            Musculoskeletal: Digits/nail without cyanosis/clubbing.  Strength 5/5 BLE.                    Neck: Supple, no significant adenopathy                  Chest:  No wheezes, symmetric air entry. No use of accessory muscles               Cardiac: Normal rate and regular rhythm            Abdomen: Soft, nontender, nondistended      Extremities:    2+ R DP pulse, 2+ L DP pulse      1+ RLE edema, 1+ LLE edema    Skin:  RLE no ulcer; LLE no ulcer      RLE + spider veins, LLE + spider veins      RLE no varicose veins, LLE no varicose veins    CEAP 3/3    VCSS 18    LAB RESULTS:  No results found for: CBC  No results found for: LABPROT, INR  Lab Results   Component Value Date     10/18/2021    K 4.2 10/18/2021     10/18/2021    CO2 24 10/18/2021    GLU 96 10/18/2021    BUN 14 10/18/2021    CREATININE 0.8 10/18/2021    CALCIUM 9.4 10/18/2021    ANIONGAP 7 (L) 10/18/2021    EGFRNONAA >60 10/18/2021     Lab Results   Component Value Date    WBC 5.03 10/18/2021    RBC 4.01 10/18/2021    HGB 12.2 10/18/2021    HCT 38.7 10/18/2021    MCV 97 10/18/2021    MCH 30.4 10/18/2021    MCHC 31.5 (L) 10/18/2021    RDW 13.1 10/18/2021     10/18/2021    MPV 10.8 10/18/2021    GRAN 2.4 10/18/2021    GRAN 48.1 10/18/2021    LYMPH 1.7 10/18/2021    LYMPH 33.0 10/18/2021    MONO 0.5 10/18/2021    MONO 10.7 10/18/2021    EOS 0.4 10/18/2021    BASO 0.03 10/18/2021    EOSINOPHIL 7.4 10/18/2021    BASOPHIL 0.6 10/18/2021    DIFFMETHOD Automated 10/18/2021     .  Lab Results   Component Value Date    HGBA1C 5.0 07/17/2020       IMAGING:  All pertinent imaging has been reviewed and interpreted independently.    Venous US 2021 Impression:  L calf GSV reflux    IMP/PLAN:  51 y.o. female with   Patient Active Problem List   Diagnosis    Anxiety    Attention deficit disorder (ADD) in adult    Anxiety and depression    Chronic headache disorder    Impingement syndrome of left shoulder    Sciatica of left side    Snoring    being managed by PCP and specialists who is here today for evaluation of BLE spider veins and pain.    -recommend compression with stockings, elevation, dietary changes associated with water and sodium intake discussed at length with patient  -Exercise   -L shoulder pain appears to be related to MSK etiology - follow Ortho recs  -Bilateral radiating  sharp pain appears to be neurogenic in etiology - follow Neuro recs  -RTC 6-12 months for further evaluation    I spent 12 minutes evaluating this patient and greater than 50% of the time was spent counseling, coordinator care and discussing the plan of care.  All questions were answered and patient stated understanding with agreement with the above treatment plan.    Jamir Workman MD [facility]  Vascular and Endovascular Surgery

## 2022-02-04 DIAGNOSIS — E66.9 OBESITY, UNSPECIFIED CLASSIFICATION, UNSPECIFIED OBESITY TYPE, UNSPECIFIED WHETHER SERIOUS COMORBIDITY PRESENT: Primary | ICD-10-CM

## 2022-03-13 DIAGNOSIS — Z12.31 OTHER SCREENING MAMMOGRAM: ICD-10-CM

## 2022-03-23 ENCOUNTER — HOSPITAL ENCOUNTER (OUTPATIENT)
Dept: RADIOLOGY | Facility: HOSPITAL | Age: 52
Discharge: HOME | End: 2022-03-23
Attending: FAMILY MEDICINE
Payer: MEDICAID

## 2022-03-23 VITALS — HEIGHT: 62 IN | BODY MASS INDEX: 43.43 KG/M2 | WEIGHT: 236 LBS

## 2022-03-23 DIAGNOSIS — Z12.31 OTHER SCREENING MAMMOGRAM: ICD-10-CM

## 2022-03-23 PROCEDURE — 77067 MAMMO DIGITAL SCREENING BILAT WITH TOMO: ICD-10-PCS | Mod: 26,,, | Performed by: RADIOLOGY

## 2022-03-23 PROCEDURE — 77067 SCR MAMMO BI INCL CAD: CPT | Mod: 26,,, | Performed by: RADIOLOGY

## 2022-03-23 PROCEDURE — 77063 MAMMO DIGITAL SCREENING BILAT WITH TOMO: ICD-10-PCS | Mod: 26,,, | Performed by: RADIOLOGY

## 2022-03-23 PROCEDURE — 77063 BREAST TOMOSYNTHESIS BI: CPT | Mod: TC,PN

## 2022-03-23 PROCEDURE — 77063 BREAST TOMOSYNTHESIS BI: CPT | Mod: 26,,, | Performed by: RADIOLOGY

## 2022-03-23 PROCEDURE — 77067 SCR MAMMO BI INCL CAD: CPT | Mod: TC,PN

## 2022-04-11 ENCOUNTER — PATIENT MESSAGE (OUTPATIENT)
Dept: PRIMARY CARE CLINIC | Facility: CLINIC | Age: 52
End: 2022-04-11
Payer: MEDICAID

## 2022-04-11 RX ORDER — PAROXETINE 10 MG/1
TABLET, FILM COATED ORAL
Qty: 90 TABLET | Refills: 3 | OUTPATIENT
Start: 2022-04-11

## 2022-04-11 NOTE — TELEPHONE ENCOUNTER
No new care gaps identified.  Powered by [employer]. Reference number: [id number].   4/11/2022 3:51:11 AM CDT

## 2022-04-11 NOTE — TELEPHONE ENCOUNTER
Refill Routing Note   Medication(s) are not appropriate for processing by [facility] for the following reason(s):      - Patient has not been seen in over 15 months by PCP  - Patient has been seen in the ED/Hospital since the last PCP visit    [facility] action(s):  Defer          Medication reconciliation completed: No     Appointments  past 12m or future 3m with PCP    Date Provider   Last Visit   3/30/2021 Marlyn Tripathi MD   Next Visit   Visit date not found Marlyn Tripathi MD   ED visits in past 90 days: 0        Note composed:1:08 PM 04/11/2022

## 2022-05-12 ENCOUNTER — OFFICE VISIT (OUTPATIENT)
Dept: OBSTETRICS AND GYNECOLOGY | Facility: CLINIC | Age: 52
End: 2022-05-12
Payer: MEDICAID

## 2022-05-12 VITALS
DIASTOLIC BLOOD PRESSURE: 70 MMHG | WEIGHT: 237 LBS | HEIGHT: 62 IN | BODY MASS INDEX: 43.61 KG/M2 | SYSTOLIC BLOOD PRESSURE: 95 MMHG

## 2022-05-12 DIAGNOSIS — R10.2 PELVIC PAIN: Primary | ICD-10-CM

## 2022-05-12 PROCEDURE — 3008F PR BODY MASS INDEX (BMI) DOCUMENTED: ICD-10-PCS | Mod: CPTII,,, | Performed by: OBSTETRICS & GYNECOLOGY

## 2022-05-12 PROCEDURE — 1160F RVW MEDS BY RX/DR IN RCRD: CPT | Mod: CPTII,,, | Performed by: OBSTETRICS & GYNECOLOGY

## 2022-05-12 PROCEDURE — 87086 URINE CULTURE/COLONY COUNT: CPT | Performed by: OBSTETRICS & GYNECOLOGY

## 2022-05-12 PROCEDURE — 1159F MED LIST DOCD IN RCRD: CPT | Mod: CPTII,,, | Performed by: OBSTETRICS & GYNECOLOGY

## 2022-05-12 PROCEDURE — 3074F PR MOST RECENT SYSTOLIC BLOOD PRESSURE < 130 MM HG: ICD-10-PCS | Mod: CPTII,,, | Performed by: OBSTETRICS & GYNECOLOGY

## 2022-05-12 PROCEDURE — 3078F DIAST BP <80 MM HG: CPT | Mod: CPTII,,, | Performed by: OBSTETRICS & GYNECOLOGY

## 2022-05-12 PROCEDURE — 87491 CHLMYD TRACH DNA AMP PROBE: CPT | Mod: 59 | Performed by: OBSTETRICS & GYNECOLOGY

## 2022-05-12 PROCEDURE — 87801 DETECT AGNT MULT DNA AMPLI: CPT | Performed by: OBSTETRICS & GYNECOLOGY

## 2022-05-12 PROCEDURE — 99999 PR PBB SHADOW E&M-EST. PATIENT-LVL III: ICD-10-PCS | Mod: PBBFAC,,, | Performed by: OBSTETRICS & GYNECOLOGY

## 2022-05-12 PROCEDURE — 3074F SYST BP LT 130 MM HG: CPT | Mod: CPTII,,, | Performed by: OBSTETRICS & GYNECOLOGY

## 2022-05-12 PROCEDURE — 99214 PR OFFICE/OUTPT VISIT, EST, LEVL IV, 30-39 MIN: ICD-10-PCS | Mod: S$PBB,,, | Performed by: OBSTETRICS & GYNECOLOGY

## 2022-05-12 PROCEDURE — 99214 OFFICE O/P EST MOD 30 MIN: CPT | Mod: S$PBB,,, | Performed by: OBSTETRICS & GYNECOLOGY

## 2022-05-12 PROCEDURE — 87481 CANDIDA DNA AMP PROBE: CPT | Mod: 59 | Performed by: OBSTETRICS & GYNECOLOGY

## 2022-05-12 PROCEDURE — 87591 N.GONORRHOEAE DNA AMP PROB: CPT | Performed by: OBSTETRICS & GYNECOLOGY

## 2022-05-12 PROCEDURE — 1159F PR MEDICATION LIST DOCUMENTED IN MEDICAL RECORD: ICD-10-PCS | Mod: CPTII,,, | Performed by: OBSTETRICS & GYNECOLOGY

## 2022-05-12 PROCEDURE — 99213 OFFICE O/P EST LOW 20 MIN: CPT | Mod: PBBFAC,PN | Performed by: OBSTETRICS & GYNECOLOGY

## 2022-05-12 PROCEDURE — 3008F BODY MASS INDEX DOCD: CPT | Mod: CPTII,,, | Performed by: OBSTETRICS & GYNECOLOGY

## 2022-05-12 PROCEDURE — 99999 PR PBB SHADOW E&M-EST. PATIENT-LVL III: CPT | Mod: PBBFAC,,, | Performed by: OBSTETRICS & GYNECOLOGY

## 2022-05-12 PROCEDURE — 3078F PR MOST RECENT DIASTOLIC BLOOD PRESSURE < 80 MM HG: ICD-10-PCS | Mod: CPTII,,, | Performed by: OBSTETRICS & GYNECOLOGY

## 2022-05-12 PROCEDURE — 1160F PR REVIEW ALL MEDS BY PRESCRIBER/CLIN PHARMACIST DOCUMENTED: ICD-10-PCS | Mod: CPTII,,, | Performed by: OBSTETRICS & GYNECOLOGY

## 2022-05-12 RX ORDER — KETOROLAC TROMETHAMINE 10 MG/1
10 TABLET, FILM COATED ORAL EVERY 6 HOURS PRN
Qty: 20 TABLET | Refills: 0 | Status: SHIPPED | OUTPATIENT
Start: 2022-05-12 | End: 2022-05-17

## 2022-05-12 NOTE — PROGRESS NOTES
"Chief Complaint   Patient presents with    Pelvic Pain       HPI:   52 y.o.  here today for intermittent LLQ pain for the past two months. She is s/p hysterectomy . Mostly on the left lower side, occasionally hurts in her back/left flank. Pain feels deep, not external. Has a history of arthritis. Went to [facility] in [facility], did an exam and sent her for a pelvic US, nonvisualization of bilateral ovaries. Treated with vaginal estrogen which she has not used regularly. Has chronic constipation. Not sexually active since December.     Labs / Significant Studies  Pap: N/A, s/p hyst, no history of abnormal paps.     No visits with results within 3 Month(s) from this visit.   Latest known visit with results is:   Admission on 2022, Discharged on 2022   Component Date Value Ref Range Status    Final Pathologic Diagnosis 2022    Final                    Value:Transverse colon polyp, biopsy:  Colonic mucosa with lymphoid aggregates      Interp By Keli Fleming D.O., Signed on 2022 at 09:40    Gross 2022    Final                    Value:Patient ID/ Pathology ID:  [id number]  Received in formalin, labeled "transverse", and are multiple tan-white soft  tissue fragments measuring 0.8 x 0.3 x 0.2 cm, in aggregate. Submitted  entirely in cassette GVG--1-A  Coco Reese      Disclaimer 2022    Final                    Value:Unless the case is a 'gross only' or additional testing only, the final  diagnosis for each specimen is based on a microscopic examination of  appropriate tissue sections.          Past Medical History:   Diagnosis Date    Allergy     Anxiety      Past Surgical History:   Procedure Laterality Date     SECTION      COLONOSCOPY N/A 2022    Procedure: COLONOSCOPY;  Surgeon: Franky Murcia MD;  Location: [facility] ([address] [place] [address]);  Service: Endoscopy;  Laterality: N/A;  -[facility] portal-tb    HYSTERECTOMY      partial "    TUBAL LIGATION         Current Outpatient Medications:     ibuprofen (ADVIL,MOTRIN) 800 MG tablet, Take 1 tablet (800 mg total) by mouth every meal as needed for Pain. Take with food, Disp: 20 tablet, Rfl: 0    metroNIDAZOLE (FLAGYL) 500 MG tablet, Take 1 tablet (500 mg total) by mouth every 12 (twelve) hours. for 7 days, Disp: 14 tablet, Rfl: 0    multivitamin capsule, Take 1 capsule by mouth once daily., Disp: , Rfl:   Review of patient's allergies indicates:   Allergen Reactions    Cyanocobalamin (vitamin b-12) Rash    Sulfamethoxazole-trimethoprim     Sulfa (sulfonamide antibiotics) Rash     OB History    Para Term  AB Living   3 3 3     3   SAB IAB Ectopic Multiple Live Births           3      # Outcome Date GA Lbr Vignesh/2nd Weight Sex Delivery Anes PTL Lv   3 Term  40w0d   M Vag-Spont EPI  DORENE   2 Term  40w0d   F Vag-Spont EPI  DORENE   1 Term  40w0d   M CS-Unspec EPI  DORENE     Social History     Tobacco Use    Smoking status: Never Smoker    Smokeless tobacco: Never Used   Substance Use Topics    Alcohol use: Yes     Comment: Socially    Drug use: Never     Family History   Problem Relation Age of Onset    Hypertension Mother     Cancer Father     Breast cancer Maternal Aunt     Melanoma Neg Hx     Ovarian cancer Neg Hx     Colon cancer Neg Hx        Review of Systems   Negative except as in HPI      Physical Exam   Vitals:    22 1431   BP: 95/70     Body mass index is 43.35 kg/m².    Physical Exam  Constitutional:       General: She is not in acute distress.     Appearance: Normal appearance.   HENT:      Head: Normocephalic and atraumatic.   Pulmonary:      Effort: Pulmonary effort is normal.   Abdominal:      General: Abdomen is flat. There is no distension.      Palpations: Abdomen is soft.      Tenderness: There is no abdominal tenderness. There is no right CVA tenderness, left CVA tenderness, guarding or rebound.   Musculoskeletal:         General: Normal  range of motion.      Cervical back: Normal range of motion.   Neurological:      General: No focal deficit present.      Mental Status: She is alert and oriented to person, place, and time.   Skin:     General: Skin is warm and dry.   Psychiatric:         Mood and Affect: Mood normal.         Behavior: Behavior normal.         Thought Content: Thought content normal.          Labs reviewed: TVUS, CBC, CMP    ASSESSMENT:   Patient Active Problem List   Diagnosis    Anxiety    Attention deficit disorder (ADD) in adult    Anxiety and depression    Chronic headache disorder    Impingement syndrome of left shoulder    Sciatica of left side    Snoring    Pelvic pain       PLAN:  Problem List Items Addressed This Visit        GI    Pelvic pain - Primary    Current Assessment & Plan     Uncertain etiology. Infectious workup performed including UA, Affirm, and GC/CT. No CVA or abdominal tenderness. Recently treated for UTI but pain did not improve. Reports constipation - recommend Miralax and Colace to regulate bowel movements. Toradol Rx written. TVUS wnl from [facility]. Patient advised to contact me with any worsening or persistent pain.            Relevant Orders    Urine culture (Completed)    Vaginosis Screen by DNA Probe (Completed)    C. trachomatis/N. gonorrhoeae by AMP DNA (Completed)           Total time spent on this encounter was 20 minutes.  This includes preparing to see the patient;  obtaining/reviewing separately obtained history;  performing a medical exam and/or evaluation;   counseling/educating the patient;  ordering medications, tests, of procedures;   referring/communicating with other health care professionals;  EMR documentation;  interpreting/communicating results to the patient;  and/or care coordination.    Follow up if symptoms worsen or fail to improve.       Coco Castellano MD  Department of Obstetrics & Gynecology  [facility]

## 2022-05-13 LAB
C TRACH DNA SPEC QL NAA+PROBE: NOT DETECTED
N GONORRHOEA DNA SPEC QL NAA+PROBE: NOT DETECTED

## 2022-05-14 LAB
BACTERIA UR CULT: NORMAL
BACTERIA UR CULT: NORMAL

## 2022-05-16 ENCOUNTER — PATIENT MESSAGE (OUTPATIENT)
Dept: OBSTETRICS AND GYNECOLOGY | Facility: CLINIC | Age: 52
End: 2022-05-16
Payer: MEDICAID

## 2022-05-16 ENCOUNTER — PATIENT MESSAGE (OUTPATIENT)
Dept: PRIMARY CARE CLINIC | Facility: CLINIC | Age: 52
End: 2022-05-16
Payer: MEDICAID

## 2022-05-16 DIAGNOSIS — B96.89 BACTERIAL VAGINOSIS: Primary | ICD-10-CM

## 2022-05-16 DIAGNOSIS — N76.0 BACTERIAL VAGINOSIS: Primary | ICD-10-CM

## 2022-05-16 LAB
BACTERIAL VAGINOSIS DNA: POSITIVE
CANDIDA GLABRATA DNA: NEGATIVE
CANDIDA KRUSEI DNA: NEGATIVE
CANDIDA RRNA VAG QL PROBE: NEGATIVE
T VAGINALIS RRNA GENITAL QL PROBE: NEGATIVE

## 2022-05-16 RX ORDER — METRONIDAZOLE 500 MG/1
500 TABLET ORAL EVERY 12 HOURS
Qty: 14 TABLET | Refills: 0 | Status: SHIPPED | OUTPATIENT
Start: 2022-05-16 | End: 2022-05-23

## 2022-05-17 ENCOUNTER — PATIENT MESSAGE (OUTPATIENT)
Dept: PRIMARY CARE CLINIC | Facility: CLINIC | Age: 52
End: 2022-05-17
Payer: MEDICAID

## 2022-05-19 PROBLEM — R10.2 PELVIC PAIN: Status: ACTIVE | Noted: 2022-05-19

## 2022-05-20 NOTE — ASSESSMENT & PLAN NOTE
Uncertain etiology. Infectious workup performed including UA, Affirm, and GC/CT. No CVA or abdominal tenderness. Recently treated for UTI but pain did not improve. Reports constipation - recommend Miralax and Colace to regulate bowel movements. Toradol Rx written. [place] [facility] Patient advised to contact me with any worsening or persistent pain.    While pt is sleeping her pulse ox drops to 85%, pt placed in a room and placed on nc 3liters, pt also given yony Ferrara RN  08/04/20 [id number]

## 2022-06-06 ENCOUNTER — HOSPITAL ENCOUNTER (EMERGENCY)
Facility: OTHER | Age: 52
Discharge: HOME | End: 2022-06-06
Attending: EMERGENCY MEDICINE
Payer: MEDICAID

## 2022-06-06 VITALS
WEIGHT: 239 LBS | OXYGEN SATURATION: 97 % | SYSTOLIC BLOOD PRESSURE: 131 MMHG | DIASTOLIC BLOOD PRESSURE: 69 MMHG | HEIGHT: 62 IN | BODY MASS INDEX: 43.98 KG/M2 | TEMPERATURE: 98 F | RESPIRATION RATE: 16 BRPM | HEART RATE: 78 BPM

## 2022-06-06 DIAGNOSIS — Z51.89 WOUND CHECK, ABSCESS: Primary | ICD-10-CM

## 2022-06-06 PROCEDURE — 99283 EMERGENCY DEPT VISIT LOW MDM: CPT | Mod: 25

## 2022-06-06 PROCEDURE — 25000003 PHARM REV CODE 250: Performed by: PHYSICIAN ASSISTANT

## 2022-06-06 RX ORDER — MUPIROCIN 20 MG/G
1 OINTMENT TOPICAL
Status: COMPLETED | OUTPATIENT
Start: 2022-06-06 | End: 2022-06-06

## 2022-06-06 RX ADMIN — MUPIROCIN 22 G: 20 OINTMENT TOPICAL at 12:06

## 2022-06-06 NOTE — Clinical Note
"Kathrine Hargrove" Pascale was seen and treated in our emergency department on 6/6/2022.  She may return to work on 06/08/2022.       If you have any questions or concerns, please don't hesitate to call.      KIRSTEN Bond"

## 2022-06-06 NOTE — ED PROVIDER NOTES
"  Source of History:  Patient     Chief complaint:  Wound Check (Pt reports she had an abscess drained from under her L arm yesterday.  States she was told to come to ER today due to "bright red blood on packing and red streaks coming from site.  Pt denies any pus drainage or fever)      HPI:  Kathrine Ashraf is a 52 y.o. female presenting to the ED for reassessment of abscess.  Patient states that she noted abscess development over the past several days.  She was seen in urgent care yesterday where area will was I&D.  She states that she was placed on doxycycline and provided anti-inflammatory.  She reports some improvement pain.  She reports that when changing dressing today she had noted drainage and bleeding on dressing.  She did discuss this with urgent care and as she reports this combined with some reported streaking from the site she was encouraged to go the ED for further evaluation.  She denies any fever, chills or additional complaints.  She does report that she has had similar eruptions and attributes that to deodorant use      This is the extent to the patients complaints today here in the emergency department.    ROS: As per HPI and below:  Constitutional: No fever.  No chills.  Eyes: No visual changes.   ENT: No sore throat. No ear pain.  Urinary: No abnormal urination.  MSK:  Left axilla pain  Integument:  Abscess to left axilla. No rashes or lesions.    Review of patient's allergies indicates:   Allergen Reactions    Cyanocobalamin (vitamin b-12) Rash    Sulfamethoxazole-trimethoprim     Sulfa (sulfonamide antibiotics) Rash       PMH:  As per HPI and below:  Past Medical History:   Diagnosis Date    Allergy     Anxiety      Past Surgical History:   Procedure Laterality Date     SECTION      COLONOSCOPY N/A 2022    Procedure: COLONOSCOPY;  Surgeon: Franky Murcia MD;  Location: [address]);  Service: Endoscopy;  Laterality: N/A;  -[facility] portal-tb    " "HYSTERECTOMY      partial    TUBAL LIGATION         Social History     Tobacco Use    Smoking status: Never Smoker    Smokeless tobacco: Never Used   Substance Use Topics    Alcohol use: Yes     Comment: Socially    Drug use: Never       Physical Exam:    /69 (BP Location: Right arm, Patient Position: Sitting)   Pulse 78   Temp 98.1 °F (36.7 °C) (Oral)   Resp 16   Ht 5' 2" (1.575 m)   Wt 108.4 kg (239 lb)   LMP 01/01/2012   SpO2 97%   BMI 43.71 kg/m²   Nursing note and vital signs reviewed.  Constitutional: No acute distress.  Eyes: No conjunctival injection.  Extraocular muscles are intact.  ENT: Normal phonation.  Musculoskeletal:  Fair range of motion of affected left upper extremity.  Large ample opening noted to the left lateral axilla with packing in place.  Purulent drainage noted from the site.  Surrounding area of induration erythema.  No extending erythema or streaking  Skin: No rashes seen.  Good turgor.  No abrasions.  No ecchymoses.  Psych: Appropriate, conversant.        I decided to obtain the patient's medical records.      Imaging Results    None         MDM:    52 y.o. female with abscess.   Fair range of motion of affected left upper extremity.  Large ample opening noted to the left lateral axilla with packing in place.  Purulent drainage noted from the site.  Surrounding area of induration erythema.  No extending erythema or streaking. FROM of all joints on affected extremities, sensation and capillary refill intact.     DDX: abscess, cellulitis, folliculitis    ED management:  Reassured patient area appears to have continued healing.  Discuss continued drainage from the site will likely occur.  As she has ample opening did not feel additional I and D warranted.  Encouraged warm compress.  Encouraged continued antibiotic use as previously prescribed.  Instructed to have close wound check as previously discussed with packing removal at that time.       Impression/Plan: The " encounter diagnosis was Wound check, abscess.   Patient will follow up with Primary for wound check in 2-3 days.  Patient cautioned on when to return to ED.  Pt. Understands and agrees with current treatment plan                   Diagnostic Impression:    1. Wound check, abscess         ED Disposition Condition    Discharge Stable          ED Prescriptions     None        Follow-up Information     Follow up With Specialties Details Why Contact Info    Chela Rivas MD Family Medicine Go in 2 days For wound re-check [id number] [facility] [address]  [phone]            Please pardon typos or dictation errors, as this note was transcribed using M*Modal fluency direct dictation software.      KIRSTEN Bond  06/06/22 [id number]

## 2022-06-13 ENCOUNTER — TELEPHONE (OUTPATIENT)
Dept: BARIATRICS | Facility: CLINIC | Age: 52
End: 2022-06-13
Payer: MEDICAID

## 2022-06-13 ENCOUNTER — LAB VISIT (OUTPATIENT)
Dept: LAB | Facility: HOSPITAL | Age: 52
End: 2022-06-13
Attending: FAMILY MEDICINE
Payer: MEDICAID

## 2022-06-13 ENCOUNTER — OFFICE VISIT (OUTPATIENT)
Dept: PRIMARY CARE CLINIC | Facility: CLINIC | Age: 52
End: 2022-06-13
Payer: MEDICAID

## 2022-06-13 VITALS
BODY MASS INDEX: 44.63 KG/M2 | SYSTOLIC BLOOD PRESSURE: 125 MMHG | WEIGHT: 242.5 LBS | RESPIRATION RATE: 19 BRPM | HEIGHT: 62 IN | OXYGEN SATURATION: 98 % | HEART RATE: 80 BPM | DIASTOLIC BLOOD PRESSURE: 74 MMHG

## 2022-06-13 DIAGNOSIS — G44.229 CHRONIC TENSION-TYPE HEADACHE, NOT INTRACTABLE: ICD-10-CM

## 2022-06-13 DIAGNOSIS — Z11.59 NEED FOR HEPATITIS C SCREENING TEST: ICD-10-CM

## 2022-06-13 DIAGNOSIS — Z76.89 ENCOUNTER FOR WEIGHT MANAGEMENT: ICD-10-CM

## 2022-06-13 DIAGNOSIS — M54.32 SCIATICA OF LEFT SIDE: ICD-10-CM

## 2022-06-13 DIAGNOSIS — E66.01 CLASS 3 SEVERE OBESITY DUE TO EXCESS CALORIES WITH SERIOUS COMORBIDITY AND BODY MASS INDEX (BMI) OF 40.0 TO 44.9 IN ADULT: ICD-10-CM

## 2022-06-13 DIAGNOSIS — M75.42 IMPINGEMENT SYNDROME OF LEFT SHOULDER: ICD-10-CM

## 2022-06-13 DIAGNOSIS — F98.8 ATTENTION DEFICIT DISORDER (ADD) IN ADULT: ICD-10-CM

## 2022-06-13 DIAGNOSIS — F32.A ANXIETY AND DEPRESSION: ICD-10-CM

## 2022-06-13 DIAGNOSIS — Z00.00 ANNUAL PHYSICAL EXAM: Primary | ICD-10-CM

## 2022-06-13 DIAGNOSIS — F41.9 ANXIETY AND DEPRESSION: ICD-10-CM

## 2022-06-13 PROBLEM — R10.2 PELVIC PAIN: Status: RESOLVED | Noted: 2022-05-19 | Resolved: 2022-06-13

## 2022-06-13 PROCEDURE — 36415 COLL VENOUS BLD VENIPUNCTURE: CPT | Mod: PN | Performed by: FAMILY MEDICINE

## 2022-06-13 PROCEDURE — 1160F RVW MEDS BY RX/DR IN RCRD: CPT | Mod: CPTII,,, | Performed by: FAMILY MEDICINE

## 2022-06-13 PROCEDURE — 99215 OFFICE O/P EST HI 40 MIN: CPT | Mod: PBBFAC,PN | Performed by: FAMILY MEDICINE

## 2022-06-13 PROCEDURE — 99396 PR PREVENTIVE VISIT,EST,40-64: ICD-10-PCS | Mod: S$PBB,,, | Performed by: FAMILY MEDICINE

## 2022-06-13 PROCEDURE — 1159F MED LIST DOCD IN RCRD: CPT | Mod: CPTII,,, | Performed by: FAMILY MEDICINE

## 2022-06-13 PROCEDURE — 3008F PR BODY MASS INDEX (BMI) DOCUMENTED: ICD-10-PCS | Mod: CPTII,,, | Performed by: FAMILY MEDICINE

## 2022-06-13 PROCEDURE — 3074F PR MOST RECENT SYSTOLIC BLOOD PRESSURE < 130 MM HG: ICD-10-PCS | Mod: CPTII,,, | Performed by: FAMILY MEDICINE

## 2022-06-13 PROCEDURE — 3074F SYST BP LT 130 MM HG: CPT | Mod: CPTII,,, | Performed by: FAMILY MEDICINE

## 2022-06-13 PROCEDURE — 1160F PR REVIEW ALL MEDS BY PRESCRIBER/CLIN PHARMACIST DOCUMENTED: ICD-10-PCS | Mod: CPTII,,, | Performed by: FAMILY MEDICINE

## 2022-06-13 PROCEDURE — 99999 PR PBB SHADOW E&M-EST. PATIENT-LVL V: ICD-10-PCS | Mod: PBBFAC,,, | Performed by: FAMILY MEDICINE

## 2022-06-13 PROCEDURE — 99396 PREV VISIT EST AGE 40-64: CPT | Mod: S$PBB,,, | Performed by: FAMILY MEDICINE

## 2022-06-13 PROCEDURE — 3078F PR MOST RECENT DIASTOLIC BLOOD PRESSURE < 80 MM HG: ICD-10-PCS | Mod: CPTII,,, | Performed by: FAMILY MEDICINE

## 2022-06-13 PROCEDURE — 3008F BODY MASS INDEX DOCD: CPT | Mod: CPTII,,, | Performed by: FAMILY MEDICINE

## 2022-06-13 PROCEDURE — 86803 HEPATITIS C AB TEST: CPT | Performed by: FAMILY MEDICINE

## 2022-06-13 PROCEDURE — 1159F PR MEDICATION LIST DOCUMENTED IN MEDICAL RECORD: ICD-10-PCS | Mod: CPTII,,, | Performed by: FAMILY MEDICINE

## 2022-06-13 PROCEDURE — 99999 PR PBB SHADOW E&M-EST. PATIENT-LVL V: CPT | Mod: PBBFAC,,, | Performed by: FAMILY MEDICINE

## 2022-06-13 PROCEDURE — 3078F DIAST BP <80 MM HG: CPT | Mod: CPTII,,, | Performed by: FAMILY MEDICINE

## 2022-06-13 RX ORDER — ERGOCALCIFEROL 1.25 MG/1
CAPSULE ORAL
COMMUNITY

## 2022-06-13 RX ORDER — ESTRADIOL 0.1 MG/G
CREAM VAGINAL
COMMUNITY
Start: 2022-03-28 | End: 2023-02-02 | Stop reason: SDUPTHER

## 2022-06-13 NOTE — PROGRESS NOTES
Subjective:       Patient ID: Kathrine Ashraf is a 52 y.o. female.    Chief Complaint: Annual Exam      51 yo female presents for annual physical.     She is interested in the weight management program. No new issues or concerns.    Labs reviewed: due for hepatitis C screening according to guidelines.    No adverse medication events.    The following portions of the patient's history were reviewed and updated as appropriate: allergies, current medications, past family history, past medical history, past social history, past surgical history and problem list.    Review of Systems   Constitutional: Negative for activity change, appetite change, chills, diaphoresis, fatigue, fever and unexpected weight change.   HENT: Negative for nasal congestion, dental problem, facial swelling, nosebleeds, postnasal drip, rhinorrhea, sore throat, tinnitus and trouble swallowing.    Eyes: Negative for pain, discharge, itching and visual disturbance.   Respiratory: Negative for apnea, chest tightness, shortness of breath, wheezing and stridor.    Cardiovascular: Negative for chest pain, palpitations and leg swelling.   Gastrointestinal: Negative for abdominal distention, abdominal pain, constipation, diarrhea, nausea, rectal pain and vomiting.   Endocrine: Negative for cold intolerance, heat intolerance and polyuria.   Genitourinary: Negative for difficulty urinating, dysuria, frequency, hematuria and urgency.   Musculoskeletal: Negative for arthralgias, gait problem, myalgias, neck pain and neck stiffness.   Integumentary:  Negative for color change and rash.   Neurological: Negative for dizziness, tremors, seizures, syncope, facial asymmetry, weakness and headaches.   Hematological: Negative for adenopathy. Does not bruise/bleed easily.   Psychiatric/Behavioral: Positive for dysphoric mood. Negative for agitation, confusion, hallucinations, self-injury and suicidal ideas. The patient is not hyperactive.           Objective:       Physical Exam  Constitutional:       General: She is not in acute distress.     Appearance: She is well-developed. She is not diaphoretic.   HENT:      Head: Normocephalic and atraumatic.      Right Ear: External ear normal.      Left Ear: External ear normal.   Eyes:      General: No scleral icterus.        Right eye: No discharge.         Left eye: No discharge.      Conjunctiva/sclera: Conjunctivae normal.      Pupils: Pupils are equal, round, and reactive to light.   Neck:      Thyroid: No thyromegaly.   Cardiovascular:      Rate and Rhythm: Normal rate and regular rhythm.      Heart sounds: Normal heart sounds. No murmur heard.    No friction rub. No gallop.   Pulmonary:      Effort: Pulmonary effort is normal. No respiratory distress.      Breath sounds: Normal breath sounds.   Chest:      Chest wall: No tenderness.   Abdominal:      General: Bowel sounds are normal. There is no distension.      Palpations: Abdomen is soft. There is no mass.      Tenderness: There is no abdominal tenderness. There is no guarding or rebound.   Musculoskeletal:         General: Normal range of motion.      Cervical back: Normal range of motion and neck supple.   Lymphadenopathy:      Cervical: No cervical adenopathy.   Skin:     General: Skin is warm.      Capillary Refill: Capillary refill takes less than 2 seconds.      Findings: No rash.   Neurological:      Mental Status: She is alert and oriented to person, place, and time.      Cranial Nerves: No cranial nerve deficit.      Motor: No abnormal muscle tone.      Coordination: Coordination normal.      Deep Tendon Reflexes: Reflexes normal.   Psychiatric:         Thought Content: Thought content normal.         Judgment: Judgment normal.         Assessment:       1. Annual physical exam    2. Encounter for weight management    3. Class 3 severe obesity due to excess calories with serious comorbidity and body mass index (BMI) of 40.0 to 44.9 in adult    4. Sciatica of left  side    5. Impingement syndrome of left shoulder    6. Chronic tension-type headache, not intractable    7. Attention deficit disorder (ADD) in adult    8. Anxiety and depression    9. Need for hepatitis C screening test        Plan:       1. Annual physical exam  Age appropriate prevention guidelines implemented, unless patient refused  Encourage Advanced directives  Labs ordered   Immunizations reviewed. Encourage yearly influenza vaccine.  Patient Counseling:  --Nutrition: Encourage healthy food choices, adequate water intake, moderate sodium/caffeine intake, diet low in saturated fat and cholesterol. Importance of caloric balance and sufficient intake of fresh fruits, vegetables, fiber, calcium, iron, and 1 mg of folate supplement per day (for females capable of pregnancy).  --Exercise: Stressed the importance of regular exercise.   --Substance Abuse: Abstinence from tobacco products. No more than 2 alcoholic drinks per day in men or 1 alcoholic drink per day in women. Avoid illicit drugs, driving or other dangerous activities under the influence. In the event of abuse, treatment offered.   --Sexuality: Safe sex practices to avoid sexually transmitted diseases; careful partner selection, use of condoms and contraceptive alternatives, avoidance of unintended pregnancy in fertile women of child-bearing age  --Injury prevention: encourage safety belts, safety helmets, smoke detector.  --Dental health: Discussed importance of regular tooth brushing X2 daily, flossing X1 daily, and routine dental visits.  --Encourage use of sun screen, wear sun protective clothing  --Encourage routine eye exams    2. Encounter for weight management  -     Ambulatory referral/consult to Weight Management Program; Future; Expected date: 06/20/2022    3. Class 3 severe obesity due to excess calories with serious comorbidity and body mass index (BMI) of 40.0 to 44.9 in adult  The importance of optimum diet & exercise discussed. The goal  for weight management is 5-10 % of total body weight reduction in 3 months.     4. Sciatica of left side  5. Impingement syndrome of left shoulder  Managed conservatively    6. Chronic tension-type headache, not intractable  No alarm symptoms requiring immediate imaging.    7. Attention deficit disorder (ADD) in adult  No longer taking stimulant medication    8. Anxiety and depression  Managing without pharmacotherapy.    9. Need for hepatitis C screening test  -     Hepatitis C Antibody; Future; Expected date: 06/13/2022    Disclaimer: This note has been generated using voice-recognition software. There may be typographical errors that have been missed during proof-reading

## 2022-06-14 LAB — HCV AB SERPL QL IA: NEGATIVE

## 2022-11-30 ENCOUNTER — TELEPHONE (OUTPATIENT)
Dept: OBSTETRICS AND GYNECOLOGY | Facility: CLINIC | Age: 52
End: 2022-11-30
Payer: MEDICAID

## 2022-11-30 NOTE — TELEPHONE ENCOUNTER
Pt wanted to see dr godfrey instead   RS her for an earlier time and added appt to wait list for something sooner

## 2022-12-02 ENCOUNTER — LAB VISIT (OUTPATIENT)
Dept: LAB | Facility: HOSPITAL | Age: 52
End: 2022-12-02
Payer: MEDICAID

## 2022-12-02 ENCOUNTER — OFFICE VISIT (OUTPATIENT)
Dept: FAMILY MEDICINE | Facility: CLINIC | Age: 52
End: 2022-12-02
Payer: MEDICAID

## 2022-12-02 VITALS
OXYGEN SATURATION: 99 % | SYSTOLIC BLOOD PRESSURE: 142 MMHG | WEIGHT: 227.06 LBS | DIASTOLIC BLOOD PRESSURE: 98 MMHG | HEIGHT: 62 IN | TEMPERATURE: 98 F | BODY MASS INDEX: 41.78 KG/M2 | HEART RATE: 78 BPM

## 2022-12-02 DIAGNOSIS — E78.2 MIXED HYPERLIPIDEMIA: ICD-10-CM

## 2022-12-02 DIAGNOSIS — G57.93 NEUROPATHY INVOLVING BOTH LOWER EXTREMITIES: ICD-10-CM

## 2022-12-02 DIAGNOSIS — I87.2 VENOUS INSUFFICIENCY OF BOTH LOWER EXTREMITIES: Primary | ICD-10-CM

## 2022-12-02 DIAGNOSIS — R03.0 ELEVATED BLOOD PRESSURE READING IN OFFICE WITHOUT DIAGNOSIS OF HYPERTENSION: ICD-10-CM

## 2022-12-02 LAB
ALBUMIN SERPL BCP-MCNC: 4.2 G/DL (ref 3.5–5.2)
ALP SERPL-CCNC: 98 U/L (ref 55–135)
ALT SERPL W/O P-5'-P-CCNC: 62 U/L (ref 10–44)
ANION GAP SERPL CALC-SCNC: 11 MMOL/L (ref 8–16)
AST SERPL-CCNC: 37 U/L (ref 10–40)
BASOPHILS # BLD AUTO: 0.03 K/UL (ref 0–0.2)
BASOPHILS NFR BLD: 0.6 % (ref 0–1.9)
BILIRUB SERPL-MCNC: 0.6 MG/DL (ref 0.1–1)
BUN SERPL-MCNC: 10 MG/DL (ref 6–20)
CALCIUM SERPL-MCNC: 10.4 MG/DL (ref 8.7–10.5)
CHLORIDE SERPL-SCNC: 103 MMOL/L (ref 95–110)
CHOLEST SERPL-MCNC: 283 MG/DL (ref 120–199)
CHOLEST/HDLC SERPL: 2.7 {RATIO} (ref 2–5)
CO2 SERPL-SCNC: 26 MMOL/L (ref 23–29)
CREAT SERPL-MCNC: 0.8 MG/DL (ref 0.5–1.4)
DIFFERENTIAL METHOD: ABNORMAL
EOSINOPHIL # BLD AUTO: 0.3 K/UL (ref 0–0.5)
EOSINOPHIL NFR BLD: 6.5 % (ref 0–8)
ERYTHROCYTE [DISTWIDTH] IN BLOOD BY AUTOMATED COUNT: 13.9 % (ref 11.5–14.5)
EST. GFR  (NO RACE VARIABLE): >60 ML/MIN/1.73 M^2
GLUCOSE SERPL-MCNC: 94 MG/DL (ref 70–110)
HCT VFR BLD AUTO: 43.3 % (ref 37–48.5)
HDLC SERPL-MCNC: 106 MG/DL (ref 40–75)
HDLC SERPL: 37.5 % (ref 20–50)
HGB BLD-MCNC: 14 G/DL (ref 12–16)
IMM GRANULOCYTES # BLD AUTO: 0.01 K/UL (ref 0–0.04)
IMM GRANULOCYTES NFR BLD AUTO: 0.2 % (ref 0–0.5)
LDLC SERPL CALC-MCNC: 163 MG/DL (ref 63–159)
LYMPHOCYTES # BLD AUTO: 1.8 K/UL (ref 1–4.8)
LYMPHOCYTES NFR BLD: 35 % (ref 18–48)
MCH RBC QN AUTO: 31.2 PG (ref 27–31)
MCHC RBC AUTO-ENTMCNC: 32.3 G/DL (ref 32–36)
MCV RBC AUTO: 96 FL (ref 82–98)
MONOCYTES # BLD AUTO: 0.5 K/UL (ref 0.3–1)
MONOCYTES NFR BLD: 10.5 % (ref 4–15)
NEUTROPHILS # BLD AUTO: 2.4 K/UL (ref 1.8–7.7)
NEUTROPHILS NFR BLD: 47.2 % (ref 38–73)
NONHDLC SERPL-MCNC: 177 MG/DL
NRBC BLD-RTO: 0 /100 WBC
PLATELET # BLD AUTO: 236 K/UL (ref 150–450)
PMV BLD AUTO: 11.1 FL (ref 9.2–12.9)
POTASSIUM SERPL-SCNC: 4.4 MMOL/L (ref 3.5–5.1)
PROT SERPL-MCNC: 8.1 G/DL (ref 6–8.4)
RBC # BLD AUTO: 4.49 M/UL (ref 4–5.4)
SODIUM SERPL-SCNC: 140 MMOL/L (ref 136–145)
TRIGL SERPL-MCNC: 70 MG/DL (ref 30–150)
WBC # BLD AUTO: 5.05 K/UL (ref 3.9–12.7)

## 2022-12-02 PROCEDURE — 80053 COMPREHEN METABOLIC PANEL: CPT | Performed by: NURSE PRACTITIONER

## 2022-12-02 PROCEDURE — 3077F PR MOST RECENT SYSTOLIC BLOOD PRESSURE >= 140 MM HG: ICD-10-PCS | Mod: CPTII,,, | Performed by: NURSE PRACTITIONER

## 2022-12-02 PROCEDURE — 3080F DIAST BP >= 90 MM HG: CPT | Mod: CPTII,,, | Performed by: NURSE PRACTITIONER

## 2022-12-02 PROCEDURE — 99999 PR PBB SHADOW E&M-EST. PATIENT-LVL IV: ICD-10-PCS | Mod: PBBFAC,,, | Performed by: NURSE PRACTITIONER

## 2022-12-02 PROCEDURE — 82607 VITAMIN B-12: CPT | Performed by: NURSE PRACTITIONER

## 2022-12-02 PROCEDURE — 36415 COLL VENOUS BLD VENIPUNCTURE: CPT | Mod: PO | Performed by: NURSE PRACTITIONER

## 2022-12-02 PROCEDURE — 1160F RVW MEDS BY RX/DR IN RCRD: CPT | Mod: CPTII,,, | Performed by: NURSE PRACTITIONER

## 2022-12-02 PROCEDURE — 99214 PR OFFICE/OUTPT VISIT, EST, LEVL IV, 30-39 MIN: ICD-10-PCS | Mod: S$PBB,,, | Performed by: NURSE PRACTITIONER

## 2022-12-02 PROCEDURE — 3008F BODY MASS INDEX DOCD: CPT | Mod: CPTII,,, | Performed by: NURSE PRACTITIONER

## 2022-12-02 PROCEDURE — 80061 LIPID PANEL: CPT | Performed by: NURSE PRACTITIONER

## 2022-12-02 PROCEDURE — 99999 PR PBB SHADOW E&M-EST. PATIENT-LVL IV: CPT | Mod: PBBFAC,,, | Performed by: NURSE PRACTITIONER

## 2022-12-02 PROCEDURE — 1159F MED LIST DOCD IN RCRD: CPT | Mod: CPTII,,, | Performed by: NURSE PRACTITIONER

## 2022-12-02 PROCEDURE — 3008F PR BODY MASS INDEX (BMI) DOCUMENTED: ICD-10-PCS | Mod: CPTII,,, | Performed by: NURSE PRACTITIONER

## 2022-12-02 PROCEDURE — 85025 COMPLETE CBC W/AUTO DIFF WBC: CPT | Performed by: NURSE PRACTITIONER

## 2022-12-02 PROCEDURE — 1160F PR REVIEW ALL MEDS BY PRESCRIBER/CLIN PHARMACIST DOCUMENTED: ICD-10-PCS | Mod: CPTII,,, | Performed by: NURSE PRACTITIONER

## 2022-12-02 PROCEDURE — 3077F SYST BP >= 140 MM HG: CPT | Mod: CPTII,,, | Performed by: NURSE PRACTITIONER

## 2022-12-02 PROCEDURE — 1159F PR MEDICATION LIST DOCUMENTED IN MEDICAL RECORD: ICD-10-PCS | Mod: CPTII,,, | Performed by: NURSE PRACTITIONER

## 2022-12-02 PROCEDURE — 99214 OFFICE O/P EST MOD 30 MIN: CPT | Mod: S$PBB,,, | Performed by: NURSE PRACTITIONER

## 2022-12-02 PROCEDURE — 3080F PR MOST RECENT DIASTOLIC BLOOD PRESSURE >= 90 MM HG: ICD-10-PCS | Mod: CPTII,,, | Performed by: NURSE PRACTITIONER

## 2022-12-02 PROCEDURE — 99214 OFFICE O/P EST MOD 30 MIN: CPT | Mod: PBBFAC,PO | Performed by: NURSE PRACTITIONER

## 2022-12-02 RX ORDER — METHOCARBAMOL 500 MG/1
500 TABLET, FILM COATED ORAL NIGHTLY PRN
COMMUNITY
Start: 2022-08-02

## 2022-12-02 RX ORDER — MELOXICAM 15 MG/1
15 TABLET ORAL
COMMUNITY
Start: 2022-09-21 | End: 2022-12-02

## 2022-12-02 RX ORDER — LIDOCAINE AND PRILOCAINE 25; 25 MG/G; MG/G
CREAM TOPICAL
COMMUNITY
Start: 2022-10-05

## 2022-12-02 RX ORDER — METRONIDAZOLE 500 MG/1
500 TABLET ORAL 2 TIMES DAILY
COMMUNITY
Start: 2022-11-22 | End: 2023-09-22

## 2022-12-02 RX ORDER — DICLOFENAC SODIUM 50 MG/1
50 TABLET, DELAYED RELEASE ORAL 2 TIMES DAILY PRN
Qty: 30 TABLET | Refills: 2 | Status: SHIPPED | OUTPATIENT
Start: 2022-12-02 | End: 2023-06-05

## 2022-12-02 RX ORDER — GABAPENTIN 100 MG/1
100 CAPSULE ORAL NIGHTLY
Qty: 30 CAPSULE | Refills: 3 | Status: SHIPPED | OUTPATIENT
Start: 2022-12-02 | End: 2023-12-02

## 2022-12-02 NOTE — PROGRESS NOTES
Subjective:       Patient ID: Kathrine Ashraf is a 52 y.o. female.    Chief Complaint: Numbness    HPI    Kathrine Ashraf is a 52 y.o. female patient that presents to clinic with a chief complaint of numbness to bilateral lower extremities. Past medical and surgical history reviewed as listed. She is known to me. Reports ongoing numbness, tingling, and back pain that has worsened over the last several months. History of venous insufficiency. She has been evaluated by Dr. Workman and recommendations were to wear compression socks daily. Pt reports adherence with compression socks. Denies nausea, vomiting, abnormal gait, weakness, falls, slurred speech, or stool incontinence. Reports occasional stress urinary incontinence.      Back Pain  This is a new problem. The current episode started more than 1 month ago. The pain is present in the lumbar spine. Associated symptoms include numbness. Pertinent negatives include no chest pain, headaches or weakness.     ROS as listed.   Past Medical History:   Diagnosis Date    Allergy     Anxiety       Past Surgical History:   Procedure Laterality Date     SECTION      COLONOSCOPY N/A 2022    Procedure: COLONOSCOPY;  Surgeon: Franky Murcia MD;  Location: [address];  Service: Endoscopy;  Laterality: N/A;  -covid [place]-[facility] portal-tb    HYSTERECTOMY      partial    TUBAL LIGATION        Family History   Problem Relation Age of Onset    Hypertension Mother     Cancer Father     Breast cancer Maternal Aunt     Melanoma Neg Hx     Ovarian cancer Neg Hx     Colon cancer Neg Hx       Review of patient's allergies indicates:   Allergen Reactions    Cyanocobalamin (vitamin b-12) Rash    Penicillins Anaphylaxis    Sulfamethoxazole-trimethoprim     Sulfa (sulfonamide antibiotics) Rash     Review of Systems   Respiratory:  Negative for shortness of breath, wheezing and stridor.    Cardiovascular:  Negative for chest pain.   Musculoskeletal:  Positive for  "back pain.   Neurological:  Positive for numbness. Negative for dizziness, tremors, syncope, facial asymmetry, weakness, light-headedness and headaches.     Objective:      Vitals:    12/02/22 1413   BP: (!) 142/98   BP Location: Left arm   Patient Position: Sitting   BP Method: Medium (Manual)   Pulse: 78   Temp: 98.2 °F (36.8 °C)   TempSrc: Oral   SpO2: 99%   Weight: 103 kg (227 lb 1.2 oz)   Height: 5' 2" (1.575 m)      Physical Exam  Vitals and nursing note reviewed.   Constitutional:       General: She is not in acute distress.     Appearance: Normal appearance. She is obese.   HENT:      Head: Normocephalic and atraumatic.      Nose: Nose normal.   Eyes:      Extraocular Movements: Extraocular movements intact.      Conjunctiva/sclera: Conjunctivae normal.      Pupils: Pupils are equal, round, and reactive to light.   Cardiovascular:      Rate and Rhythm: Normal rate and regular rhythm.      Heart sounds: Normal heart sounds. No murmur heard.  Pulmonary:      Effort: Pulmonary effort is normal. No respiratory distress.      Breath sounds: Normal breath sounds.   Abdominal:      General: Bowel sounds are normal.      Palpations: Abdomen is soft.      Tenderness: There is no abdominal tenderness.   Musculoskeletal:         General: Normal range of motion.      Cervical back: Normal range of motion and neck supple. No rigidity.      Comments: Scattered varicose veins noted to posterior lower extremities and popliteal fossa   Skin:     General: Skin is warm and dry.   Neurological:      Mental Status: She is alert and oriented to person, place, and time.      Motor: No weakness.      Gait: Gait normal.   Psychiatric:         Mood and Affect: Mood normal.         Behavior: Behavior normal.       Lab Results   Component Value Date    WBC 5.05 12/02/2022    HGB 14.0 12/02/2022    HCT 43.3 12/02/2022     12/02/2022    CHOL 283 (H) 12/02/2022    TRIG 70 12/02/2022     (H) 12/02/2022    ALT 62 (H) " 12/02/2022    AST 37 12/02/2022     12/02/2022    K 4.4 12/02/2022     12/02/2022    CREATININE 0.8 12/02/2022    BUN 10 12/02/2022    CO2 26 12/02/2022    TSH 0.687 07/17/2020    HGBA1C 5.4 04/21/2022      Assessment:       1. Venous insufficiency of both lower extremities    2. Neuropathy involving both lower extremities    3. Mixed hyperlipidemia    4. Elevated blood pressure reading in office without diagnosis of hypertension        Plan:       Venous insufficiency of both lower extremities  Unstable, recommend continue adherence with compression stockings.   Follow up with Vascular medicine.     Neuropathy involving both lower extremities  -     CBC W/ AUTO DIFFERENTIAL; Future; Expected date: 12/02/2022  -     VITAMIN B12; Future; Expected date: 12/02/2022  -     COMPREHENSIVE METABOLIC PANEL; Future; Expected date: 12/02/2022  -     US Lower Extremity Arteries Bilateral; Future; Expected date: 12/02/2022  -     gabapentin (NEURONTIN) 100 MG capsule; Take 1 capsule (100 mg total) by mouth every evening.  Dispense: 30 capsule; Refill: 3  -     diclofenac (VOLTAREN) 50 MG EC tablet; Take 1 tablet (50 mg total) by mouth 2 (two) times daily as needed (pain). With food  Dispense: 30 tablet; Refill: 2    Mixed hyperlipidemia  Consider adding statin based on ASCVD score.   -     LIPID PANEL; Future; Expected date: 12/02/2022    Elevated blood pressure reading in office without diagnosis of hypertension  See NP in 2 weeks for BP check and possible initiation of medication.      Medication List with Changes/Refills   New Medications    DICLOFENAC (VOLTAREN) 50 MG EC TABLET    Take 1 tablet (50 mg total) by mouth 2 (two) times daily as needed (pain). With food    GABAPENTIN (NEURONTIN) 100 MG CAPSULE    Take 1 capsule (100 mg total) by mouth every evening.   Current Medications    ERGOCALCIFEROL (ERGOCALCIFEROL) 50,000 UNIT CAP    Take by mouth.    ESTRADIOL (ESTRACE) 0.01 % (0.1 MG/GRAM) VAGINAL CREAM     SMARTSI Gram(s) Vaginal 3 Times a Week    LIDOCAINE-PRILOCAINE (EMLA) CREAM    SMARTSI Topical Daily PRN    METHOCARBAMOL (ROBAXIN) 500 MG TAB    Take 500 mg by mouth nightly as needed.    METRONIDAZOLE (FLAGYL) 500 MG TABLET    Take 500 mg by mouth 2 (two) times daily.    MULTIVITAMIN CAPSULE    Take 1 capsule by mouth once daily.   Discontinued Medications    IBUPROFEN (ADVIL,MOTRIN) 800 MG TABLET    Take 1 tablet (800 mg total) by mouth every meal as needed for Pain. Take with food    MELOXICAM (MOBIC) 15 MG TABLET    Take 15 mg by mouth.       Follow up in about 2 weeks (around 2022) for ISAIAH Amezquita NP.     Yojana Mcgee, DNP, APRN, FNP-C  formerly [facility]

## 2022-12-02 NOTE — PATIENT INSTRUCTIONS
Schedule with Dr. Guardado to establish care.    Follow up with Vascular Medicine.    [phone]  to schedule Ultrasound.

## 2022-12-03 LAB — VIT B12 SERPL-MCNC: 1221 PG/ML (ref 210–950)

## 2022-12-22 ENCOUNTER — HOSPITAL ENCOUNTER (OUTPATIENT)
Dept: RADIOLOGY | Facility: HOSPITAL | Age: 52
Discharge: HOME | End: 2022-12-22
Attending: NURSE PRACTITIONER
Payer: MEDICAID

## 2022-12-22 DIAGNOSIS — G57.93 NEUROPATHY INVOLVING BOTH LOWER EXTREMITIES: ICD-10-CM

## 2022-12-22 PROCEDURE — 93925 LOWER EXTREMITY STUDY: CPT | Mod: 26,,, | Performed by: RADIOLOGY

## 2022-12-22 PROCEDURE — 93925 LOWER EXTREMITY STUDY: CPT | Mod: TC

## 2022-12-22 PROCEDURE — 93925 US LOWER EXTREMITY ARTERIES BILATERAL: ICD-10-PCS | Mod: 26,,, | Performed by: RADIOLOGY

## 2022-12-28 ENCOUNTER — OFFICE VISIT (OUTPATIENT)
Dept: URGENT CARE | Facility: CLINIC | Age: 52
End: 2022-12-28
Payer: MEDICAID

## 2022-12-28 VITALS
HEART RATE: 73 BPM | SYSTOLIC BLOOD PRESSURE: 138 MMHG | DIASTOLIC BLOOD PRESSURE: 86 MMHG | BODY MASS INDEX: 41.77 KG/M2 | RESPIRATION RATE: 16 BRPM | OXYGEN SATURATION: 98 % | WEIGHT: 227 LBS | HEIGHT: 62 IN | TEMPERATURE: 98 F

## 2022-12-28 DIAGNOSIS — Z76.89 ENCOUNTER TO ESTABLISH CARE: ICD-10-CM

## 2022-12-28 DIAGNOSIS — L02.412 ABSCESS OF LEFT AXILLA: Primary | ICD-10-CM

## 2022-12-28 PROCEDURE — 1160F PR REVIEW ALL MEDS BY PRESCRIBER/CLIN PHARMACIST DOCUMENTED: ICD-10-PCS | Mod: CPTII,S$GLB,, | Performed by: FAMILY MEDICINE

## 2022-12-28 PROCEDURE — 10060 I&D ABSCESS SIMPLE/SINGLE: CPT | Mod: S$GLB,,, | Performed by: FAMILY MEDICINE

## 2022-12-28 PROCEDURE — 3075F PR MOST RECENT SYSTOLIC BLOOD PRESS GE 130-139MM HG: ICD-10-PCS | Mod: CPTII,S$GLB,, | Performed by: FAMILY MEDICINE

## 2022-12-28 PROCEDURE — 1160F RVW MEDS BY RX/DR IN RCRD: CPT | Mod: CPTII,S$GLB,, | Performed by: FAMILY MEDICINE

## 2022-12-28 PROCEDURE — 1159F MED LIST DOCD IN RCRD: CPT | Mod: CPTII,S$GLB,, | Performed by: FAMILY MEDICINE

## 2022-12-28 PROCEDURE — 1159F PR MEDICATION LIST DOCUMENTED IN MEDICAL RECORD: ICD-10-PCS | Mod: CPTII,S$GLB,, | Performed by: FAMILY MEDICINE

## 2022-12-28 PROCEDURE — 3079F PR MOST RECENT DIASTOLIC BLOOD PRESSURE 80-89 MM HG: ICD-10-PCS | Mod: CPTII,S$GLB,, | Performed by: FAMILY MEDICINE

## 2022-12-28 PROCEDURE — 10060 INCISION & DRAINAGE: ICD-10-PCS | Mod: S$GLB,,, | Performed by: FAMILY MEDICINE

## 2022-12-28 PROCEDURE — 3079F DIAST BP 80-89 MM HG: CPT | Mod: CPTII,S$GLB,, | Performed by: FAMILY MEDICINE

## 2022-12-28 PROCEDURE — 3008F BODY MASS INDEX DOCD: CPT | Mod: CPTII,S$GLB,, | Performed by: FAMILY MEDICINE

## 2022-12-28 PROCEDURE — 3075F SYST BP GE 130 - 139MM HG: CPT | Mod: CPTII,S$GLB,, | Performed by: FAMILY MEDICINE

## 2022-12-28 PROCEDURE — 99213 PR OFFICE/OUTPT VISIT, EST, LEVL III, 20-29 MIN: ICD-10-PCS | Mod: 25,S$GLB,, | Performed by: FAMILY MEDICINE

## 2022-12-28 PROCEDURE — 3008F PR BODY MASS INDEX (BMI) DOCUMENTED: ICD-10-PCS | Mod: CPTII,S$GLB,, | Performed by: FAMILY MEDICINE

## 2022-12-28 PROCEDURE — 99213 OFFICE O/P EST LOW 20 MIN: CPT | Mod: 25,S$GLB,, | Performed by: FAMILY MEDICINE

## 2022-12-28 RX ORDER — DOXYCYCLINE HYCLATE 100 MG
100 TABLET ORAL 2 TIMES DAILY
Qty: 14 TABLET | Refills: 0 | Status: SHIPPED | OUTPATIENT
Start: 2022-12-28 | End: 2023-01-04

## 2022-12-28 RX ORDER — MUPIROCIN 20 MG/G
OINTMENT TOPICAL 3 TIMES DAILY
Qty: 15 G | Refills: 0 | Status: SHIPPED | OUTPATIENT
Start: 2022-12-28 | End: 2023-01-04

## 2022-12-29 NOTE — PATIENT INSTRUCTIONS
General Discharge Instructions   PLEASE READ YOUR DISCHARGE INSTRUCTIONS ENTIRELY AS IT CONTAINS IMPORTANT INFORMATION.  If you were prescribed a narcotic or controlled medication, do not drive or operate heavy equipment or machinery while taking these medications.  If you were prescribed antibiotics, please take them to completion.  You must understand that you've received an Urgent Care treatment only and that you may be released before all your medical problems are known or treated. You, the patient, will arrange for follow up care as instructed.    OVER THE COUNTER RECOMMENDATIONS/SUGGESTIONS.    Make sure to stay well hydrated.    Use Nasal Saline to mechanically move any post nasal drip from your eustachian tube or from the back of your throat.    Use warm salt water gargles to ease your throat pain. Warm salt water gargles as needed for sore throat- 1/2 tsp salt to 1 cup warm water, gargle as desired.    Use an antihistamine such as Claritin, Zyrtec or Allegra to dry you out.    Use pseudoephedrine (behind the counter) to decongest. Pseudoephedrine 30 mg up to 240 mg /day. It can raise your blood pressure and give you palpitations.    Use mucinex (guaifenesin) to break up mucous up to 2400mg/day to loosen any mucous.    The mucinex DM pill has a cough suppressant that can be sedating. It can be used at night to stop the tickle at the back of your throat.    You can use Mucinex D (it has guaifenesin and a high dose of pseudoephedrine) in the mornings to help decongest.    Use Afrin in each nare for no longer than 3 days, as it is addictive. It can also dry out your mucous membranes and cause elevated blood pressure. This is especially useful if you are flying.    Use Flonase 1-2 sprays/nostril per day. It is a local acting steroid nasal spray, if you develop a bloody nose, stop using the medication immediately.    Sometimes Nyquil at night is beneficial to help you get some rest, however it is sedating and it  does have an antihistamine, and tylenol.    Honey is a natural cough suppressant that can be used.    Tylenol up to 4,000 mg a day is safe for short periods and can be used for body aches, pain, and fever. However in high doses and prolonged use it can cause liver irritation.    Ibuprofen is a non-steroidal anti-inflammatory that can be used for body aches, pain, and fever.However it can also cause stomach irritation if over used.     Follow up with your PCP or specialty clinic as instructed in the next 2-3 days if not improved or as needed. You can call [phone] to schedule an appointment with appropriate provider.      If you condition worsens, we recommend that you receive another evaluation at the emergency room immediately or contact your primary medical clinic's after hours call service to discuss your concerns.      Please return here or go to the Emergency Department for any concerns or worsening condition.   You can also call [phone] to schedule an appointment with the appropriate provider.    Please return here or go to the Emergency Department for any concerns or worsening of condition.    Thank you for choosing [facility]!    Our goal in the Urgent Care is to always provide outstanding medical care. You may receive a survey by mail or e-mail in the next week regarding your experience today. We would greatly appreciate you completing and returning the survey. Your feedback provides us with a way to recognize our staff who provide very good care, and it helps us learn how to improve when your experience was below our aspiration of excellence.      We appreciate you trusting us with your medical care. We hope you feel better soon. We will be happy to take care of you for all of your future medical needs.    Sincerely,    LIBBY Moreau                                             Abscess/Cellulitis   If your condition worsens or fails to improve we recommend that you receive  another evaluation at the ER immediately or contact your PCP to discuss your concerns or return here. You must understand that you've received an urgent care treatment only and that you may be released before all your medical problems are known or treated. You the patient will arrange for follouwp care as instructed.     Soak affected in warm water with epsom salts several times a day. Dilute 2 cups per gallon of water. If you cannot soak  apply warm compresses to the affected area for 20 min, 3-5 times per day and apply warm compresses frequently. If you cannot soak, use the shower head.  Use warm compresses for 20 minutes 3-5 times a day. Avoid picking or manipulating the wound. Clean the wound twice a day and put the antibiotic ointment on it. You should seek ER treatment if fever, increase pain, swelling or other signs of increasing infection.   If you were prescribed antibiotics, please take them to completion  If you are female and on BCP use additional methods to prevent pregnancy while on antibiotics and for one cycle after.   If you were given narcotics do not drive or operate heavy equipment or machinery while taking these medications.   Tylenol or ibuprofen can also be used as directed for pain unless you have an allergy to them or medical condition such as stomach ulcers, kidney or liver disease or blood thinners etc for which you should not be taking these type of medications.   Return in 48- 72 hours for recheck.         Access Navigator team who handles Medicaid referrals INTO [facility]. The main line for that team (for your referrals into [facility]) is [phone] Access Line - helps Medicaid patients to find Medicaid care OUTSIDE of [facility]. Medicaid Access Line contact is: [phone]  [email or web address] [place] [address].org - 7921394842 - a community line that can help refer   [facility]:  Marketplace and Medicaid Enrollment Assistance, Free or Low-Cost Care  Name Address Phone # Type of  Care  [facility]  [facility] [place] [address] [place] [address] [phone] Primary Care  [facility] [place] [address], [place] [address] [phone] Primary Care  [facility] [place] [address], [place] [address] [phone] Primary Care  [facility] [place] [address] [facility], [place] [address] [phone] Primary Care  [facility] [place] [address] [place] [address] [phone] Primary Care  [facility] [address], [place]  [address]  [phone] Primary Care  [facility] - [facility]  [facility] ([place])  [address], [place] [address]  [phone] Primary Care  [facility] ([place]) [address] [place] [address] [phone] Primary Care  [facility] [place] [address] [facility] [place]  [address]  [phone] Primary Care  [facility] -  [facility] [address] [place] [place] [address] [phone] Primary Care  [facility] [address], [place]  [address]  [phone] Primary Care  [facility] [address] [place] [address] [phone] Primary Care  [facility] [place] [address], [place] [address]  [phone] Primary Care  [facility] [address], [place] [address]  [phone] Primary Care  [facility] ([place]) [address] [place] [address] [phone] Primary Care  [facility] [address] [place], [place] [id number] [phone] Primary Care  [place] [facility] ([place]) [address] [place]  [address]  [phone] Primary Care  [facility] ([facility]) [address] [place]  [address]  [phone] HIV/AIDS care  [facility] ([facility])  [address] [facility], [place] [address]  [phone] Primary Care  [facility] ([place])  [address] [place] [address] [phone] Primary Care  [facility] ([place]) [address] [place] [address] [phone] Primary Care  [facility] ([place]) [address] [place] [address] [phone] Primary Care  [facility]  ([facility])  [address] [place] [address] [phone] Primary Care  [facility] ([facility]) [address] [place] [address] [phone] Primary Care  [facility] [address] [address] [phone] Primary Care  [facility] [address], [facility] [address]  [place] [address]  [phone] Primary Care  Affordable Care Act Navigators  Navigators are individuals or organizations that are trained to help consumers, small businesses, and  their employees as they look for health coverage options through the Marketplace, including completing  eligibility and enrollment forms. These individuals and organizations are required to be unbiased. Their  services are free to consumers.  [facility] ([facility])  Request an appointment through:  [email or web address] or [phone]  [address] [place] [address] [place] [address] (office covers all of [facility])  [facility]  [address] [place] [address]  10am -7pm Monday to Friday  10am to 2pm on Saturdays  [phone]  [email or web address]

## 2022-12-29 NOTE — PROCEDURES
Incision & Drainage    Date/Time: 12/28/2022 5:00 PM  Performed by: Nivia Gonzalez NP  Authorized by: Nivia Gonzalez NP       Type:  Abscess  Body area:  Upper extremity  Location details:  Left arm  Anesthesia:  Local infiltration  Local anesthetic: Lidocaine 1% without epinephrine  Anesthetic total (ml):  2  Scalpel size:  11  Incision type:  Single straight  Incision depth: dermal    Complexity:  Simple  Drainage:  Pus  Drainage amount:  Moderate  Wound treatment:  Incision, drainage and wound left open  Packing material:  None  Patient tolerance:  Patient tolerated the procedure well with no immediate complications

## 2023-01-12 ENCOUNTER — OFFICE VISIT (OUTPATIENT)
Dept: PRIMARY CARE CLINIC | Facility: CLINIC | Age: 53
End: 2023-01-12
Payer: MEDICAID

## 2023-01-12 VITALS
HEART RATE: 87 BPM | DIASTOLIC BLOOD PRESSURE: 80 MMHG | RESPIRATION RATE: 18 BRPM | TEMPERATURE: 99 F | WEIGHT: 229.5 LBS | SYSTOLIC BLOOD PRESSURE: 122 MMHG | HEIGHT: 62 IN | BODY MASS INDEX: 42.23 KG/M2 | OXYGEN SATURATION: 98 %

## 2023-01-12 DIAGNOSIS — E66.01 MORBID OBESITY WITH BMI OF 40.0-44.9, ADULT: ICD-10-CM

## 2023-01-12 DIAGNOSIS — F32.A ANXIETY AND DEPRESSION: ICD-10-CM

## 2023-01-12 DIAGNOSIS — G47.33 OBSTRUCTIVE SLEEP APNEA SYNDROME: ICD-10-CM

## 2023-01-12 DIAGNOSIS — F41.9 ANXIETY AND DEPRESSION: ICD-10-CM

## 2023-01-12 DIAGNOSIS — L02.412 ABSCESS OF LEFT AXILLA: Primary | ICD-10-CM

## 2023-01-12 PROCEDURE — 3079F PR MOST RECENT DIASTOLIC BLOOD PRESSURE 80-89 MM HG: ICD-10-PCS | Mod: CPTII,,, | Performed by: FAMILY MEDICINE

## 2023-01-12 PROCEDURE — 99214 PR OFFICE/OUTPT VISIT, EST, LEVL IV, 30-39 MIN: ICD-10-PCS | Mod: S$PBB,,, | Performed by: FAMILY MEDICINE

## 2023-01-12 PROCEDURE — 99213 OFFICE O/P EST LOW 20 MIN: CPT | Mod: PBBFAC,PN | Performed by: FAMILY MEDICINE

## 2023-01-12 PROCEDURE — 1159F PR MEDICATION LIST DOCUMENTED IN MEDICAL RECORD: ICD-10-PCS | Mod: CPTII,,, | Performed by: FAMILY MEDICINE

## 2023-01-12 PROCEDURE — 1160F RVW MEDS BY RX/DR IN RCRD: CPT | Mod: CPTII,,, | Performed by: FAMILY MEDICINE

## 2023-01-12 PROCEDURE — 99999 PR PBB SHADOW E&M-EST. PATIENT-LVL III: ICD-10-PCS | Mod: PBBFAC,,, | Performed by: FAMILY MEDICINE

## 2023-01-12 PROCEDURE — 1159F MED LIST DOCD IN RCRD: CPT | Mod: CPTII,,, | Performed by: FAMILY MEDICINE

## 2023-01-12 PROCEDURE — 3079F DIAST BP 80-89 MM HG: CPT | Mod: CPTII,,, | Performed by: FAMILY MEDICINE

## 2023-01-12 PROCEDURE — 3008F BODY MASS INDEX DOCD: CPT | Mod: CPTII,,, | Performed by: FAMILY MEDICINE

## 2023-01-12 PROCEDURE — 3074F SYST BP LT 130 MM HG: CPT | Mod: CPTII,,, | Performed by: FAMILY MEDICINE

## 2023-01-12 PROCEDURE — 3074F PR MOST RECENT SYSTOLIC BLOOD PRESSURE < 130 MM HG: ICD-10-PCS | Mod: CPTII,,, | Performed by: FAMILY MEDICINE

## 2023-01-12 PROCEDURE — 99999 PR PBB SHADOW E&M-EST. PATIENT-LVL III: CPT | Mod: PBBFAC,,, | Performed by: FAMILY MEDICINE

## 2023-01-12 PROCEDURE — 1160F PR REVIEW ALL MEDS BY PRESCRIBER/CLIN PHARMACIST DOCUMENTED: ICD-10-PCS | Mod: CPTII,,, | Performed by: FAMILY MEDICINE

## 2023-01-12 PROCEDURE — 99214 OFFICE O/P EST MOD 30 MIN: CPT | Mod: S$PBB,,, | Performed by: FAMILY MEDICINE

## 2023-01-12 PROCEDURE — 3008F PR BODY MASS INDEX (BMI) DOCUMENTED: ICD-10-PCS | Mod: CPTII,,, | Performed by: FAMILY MEDICINE

## 2023-01-12 RX ORDER — BUPROPION HYDROCHLORIDE 150 MG/1
150 TABLET ORAL DAILY
Qty: 30 TABLET | Refills: 11 | Status: SHIPPED | OUTPATIENT
Start: 2023-01-12 | End: 2023-06-05 | Stop reason: SDUPTHER

## 2023-01-12 RX ORDER — BUSPIRONE HYDROCHLORIDE 10 MG/1
10 TABLET ORAL 3 TIMES DAILY PRN
Qty: 90 TABLET | Refills: 2 | Status: SHIPPED | OUTPATIENT
Start: 2023-01-12 | End: 2023-06-15

## 2023-01-12 RX ORDER — DOXYCYCLINE 100 MG/1
100 CAPSULE ORAL EVERY 12 HOURS
Qty: 14 CAPSULE | Refills: 0 | Status: SHIPPED | OUTPATIENT
Start: 2023-01-12 | End: 2023-06-05

## 2023-01-12 NOTE — PROGRESS NOTES
Subjective:       Patient ID: Kathrine Ashraf is a 52 y.o. female.    Chief Complaint: No chief complaint on file.    HPI:  Patient is a 52-year-old female with a history of morbid obesity, anxiety, depression, and obstructive sleep apnea here to establish care.  Patient was recently treated in urgent care or recurrent abscess no in the left axilla.  She completed a course of doxycycline.  She had persistent induration in the area.  Denies pus drainage.  She reports at least 2-3 abscesses per year, but is not interested in surgery at this time.  Patient also with a history of chronic anxiety and depression which has been worse over the past 3 years.  Patient has been unemployed since March of 2021.  She reports feeling anxious about getting a job and her finances.  She reports a no motivation and feeling hopeless at times.  She has taken Paxil in the past but stated it caused her to gain a lot of weight.  She did well Paxil.  She denies suicidal ideation.  Review of Systems   Constitutional:  Positive for activity change. Negative for appetite change, fatigue, fever and unexpected weight change.   Integumentary:  Positive for color change.   Psychiatric/Behavioral:  Positive for agitation, confusion (forgetful), decreased concentration, dysphoric mood and sleep disturbance. Negative for hallucinations and suicidal ideas. The patient is nervous/anxious.        Objective:      Physical Exam  Constitutional:       Appearance: She is obese.   HENT:      Head: Normocephalic.   Skin:     Comments: Area of induration left axilla, no drainage   Neurological:      Mental Status: She is alert.   Psychiatric:         Attention and Perception: Attention normal.         Mood and Affect: Mood is anxious and depressed.         Speech: Speech normal.         Behavior: Behavior normal.         Thought Content: Thought content normal.         Cognition and Memory: Cognition normal.       Assessment:       Problem List Items Addressed  This Visit          Psychiatric    Anxiety and depression    Relevant Medications    busPIRone (BUSPAR) 10 MG tablet    buPROPion (WELLBUTRIN XL) 150 MG TB24 tablet       Other    Obstructive sleep apnea syndrome     Other Visit Diagnoses       Abscess of left axilla    -  Primary    Relevant Medications    doxycycline (MONODOX) 100 MG capsule    Morbid obesity with BMI of 40.0-44.9, adult                  Plan:     Diagnoses and all orders for this visit:    Abscess of left axilla  -     doxycycline (MONODOX) 100 MG capsule; Take 1 capsule (100 mg total) by mouth every 12 (twelve) hours.    Anxiety and depression  -     busPIRone (BUSPAR) 10 MG tablet; Take 1 tablet (10 mg total) by mouth 3 (three) times daily as needed.  -     buPROPion (WELLBUTRIN XL) 150 MG TB24 tablet; Take 1 tablet (150 mg total) by mouth once daily.    Morbid obesity with BMI of 40.0-44.9, adult  Encouraged continued exercise and diet    Obstructive sleep apnea syndrome  Patient had a sleep study at [facility].  She will follow-up with physician over there to obtain order for CPAP

## 2023-02-02 ENCOUNTER — OFFICE VISIT (OUTPATIENT)
Dept: OBSTETRICS AND GYNECOLOGY | Facility: CLINIC | Age: 53
End: 2023-02-02
Payer: MEDICAID

## 2023-02-02 VITALS
HEIGHT: 62 IN | SYSTOLIC BLOOD PRESSURE: 118 MMHG | DIASTOLIC BLOOD PRESSURE: 68 MMHG | WEIGHT: 227.31 LBS | BODY MASS INDEX: 41.83 KG/M2

## 2023-02-02 DIAGNOSIS — Z12.31 SCREENING MAMMOGRAM, ENCOUNTER FOR: ICD-10-CM

## 2023-02-02 DIAGNOSIS — Z01.419 ENCOUNTER FOR WELL WOMAN EXAM WITH ROUTINE GYNECOLOGICAL EXAM: Primary | ICD-10-CM

## 2023-02-02 DIAGNOSIS — N95.2 VAGINAL ATROPHY: ICD-10-CM

## 2023-02-02 PROCEDURE — 3008F BODY MASS INDEX DOCD: CPT | Mod: CPTII,,, | Performed by: OBSTETRICS & GYNECOLOGY

## 2023-02-02 PROCEDURE — 3074F PR MOST RECENT SYSTOLIC BLOOD PRESSURE < 130 MM HG: ICD-10-PCS | Mod: CPTII,,, | Performed by: OBSTETRICS & GYNECOLOGY

## 2023-02-02 PROCEDURE — 99999 PR PBB SHADOW E&M-EST. PATIENT-LVL III: CPT | Mod: PBBFAC,,, | Performed by: OBSTETRICS & GYNECOLOGY

## 2023-02-02 PROCEDURE — 1160F RVW MEDS BY RX/DR IN RCRD: CPT | Mod: CPTII,,, | Performed by: OBSTETRICS & GYNECOLOGY

## 2023-02-02 PROCEDURE — 99396 PREV VISIT EST AGE 40-64: CPT | Mod: S$PBB,,, | Performed by: OBSTETRICS & GYNECOLOGY

## 2023-02-02 PROCEDURE — 1159F PR MEDICATION LIST DOCUMENTED IN MEDICAL RECORD: ICD-10-PCS | Mod: CPTII,,, | Performed by: OBSTETRICS & GYNECOLOGY

## 2023-02-02 PROCEDURE — 3078F DIAST BP <80 MM HG: CPT | Mod: CPTII,,, | Performed by: OBSTETRICS & GYNECOLOGY

## 2023-02-02 PROCEDURE — 99213 OFFICE O/P EST LOW 20 MIN: CPT | Mod: PBBFAC | Performed by: OBSTETRICS & GYNECOLOGY

## 2023-02-02 PROCEDURE — 3078F PR MOST RECENT DIASTOLIC BLOOD PRESSURE < 80 MM HG: ICD-10-PCS | Mod: CPTII,,, | Performed by: OBSTETRICS & GYNECOLOGY

## 2023-02-02 PROCEDURE — 3008F PR BODY MASS INDEX (BMI) DOCUMENTED: ICD-10-PCS | Mod: CPTII,,, | Performed by: OBSTETRICS & GYNECOLOGY

## 2023-02-02 PROCEDURE — 1159F MED LIST DOCD IN RCRD: CPT | Mod: CPTII,,, | Performed by: OBSTETRICS & GYNECOLOGY

## 2023-02-02 PROCEDURE — 1160F PR REVIEW ALL MEDS BY PRESCRIBER/CLIN PHARMACIST DOCUMENTED: ICD-10-PCS | Mod: CPTII,,, | Performed by: OBSTETRICS & GYNECOLOGY

## 2023-02-02 PROCEDURE — 99396 PR PREVENTIVE VISIT,EST,40-64: ICD-10-PCS | Mod: S$PBB,,, | Performed by: OBSTETRICS & GYNECOLOGY

## 2023-02-02 PROCEDURE — 3074F SYST BP LT 130 MM HG: CPT | Mod: CPTII,,, | Performed by: OBSTETRICS & GYNECOLOGY

## 2023-02-02 PROCEDURE — 99999 PR PBB SHADOW E&M-EST. PATIENT-LVL III: ICD-10-PCS | Mod: PBBFAC,,, | Performed by: OBSTETRICS & GYNECOLOGY

## 2023-02-02 RX ORDER — ESTRADIOL 0.1 MG/G
CREAM VAGINAL
Qty: 42.5 G | Refills: 12 | Status: SHIPPED | OUTPATIENT
Start: 2023-02-02

## 2023-02-02 NOTE — PROGRESS NOTES
History & Physical  Gynecology      SUBJECTIVE:     Chief Complaint: Well Woman       History of Present Illness:    Kathrine Ashraf is a 52 y.o. female  here for annual routine Pap and checkup. Patient's last menstrual period was 2012.  Had PAVITHRA/BS.  Not having any pain recently.    Not having menses now in menopause  denies break through bleeding.   denies vaginal itching or irritation.  denies vaginal discharge.  Having vaginal dryness    She is not currently sexually active    History of abnormal pap: No  Last Pap: none on file  Last MMG: Yes - 3/23/2022  Last Colonoscopy:  2022, repeat in 7 years        Review of patient's allergies indicates:   Allergen Reactions    Cyanocobalamin (vitamin b-12) Rash    Penicillins Anaphylaxis    Sulfamethoxazole-trimethoprim     Sulfa (sulfonamide antibiotics) Rash       Past Medical History:   Diagnosis Date    Allergy     Anxiety      Past Surgical History:   Procedure Laterality Date     SECTION      COLONOSCOPY N/A 2022    Procedure: COLONOSCOPY;  Surgeon: Franky Murcia MD;  Location: [address]);  Service: Endoscopy;  Laterality: N/A;  -[facility]-inst portal-tb    HYSTERECTOMY      partial    TUBAL LIGATION       OB History          3    Para   3    Term   3            AB        Living   3         SAB        IAB        Ectopic        Multiple        Live Births   3               Family History   Problem Relation Age of Onset    Hypertension Mother     Cancer Father     Breast cancer Maternal Aunt     Melanoma Neg Hx     Ovarian cancer Neg Hx     Colon cancer Neg Hx      Social History     Tobacco Use    Smoking status: Never    Smokeless tobacco: Never   Substance Use Topics    Alcohol use: Yes     Comment: Socially    Drug use: Never       Current Outpatient Medications   Medication Sig    buPROPion (WELLBUTRIN XL) 150 MG TB24 tablet Take 1 tablet (150 mg total) by mouth once daily.    busPIRone (BUSPAR)  10 MG tablet Take 1 tablet (10 mg total) by mouth 3 (three) times daily as needed.    ergocalciferol (ERGOCALCIFEROL) 50,000 unit Cap Take by mouth.    gabapentin (NEURONTIN) 100 MG capsule Take 1 capsule (100 mg total) by mouth every evening.    multivitamin capsule Take 1 capsule by mouth once daily.    diclofenac (VOLTAREN) 50 MG EC tablet Take 1 tablet (50 mg total) by mouth 2 (two) times daily as needed (pain). With food (Patient not taking: Reported on 2023)    doxycycline (MONODOX) 100 MG capsule Take 1 capsule (100 mg total) by mouth every 12 (twelve) hours.    estradioL (ESTRACE) 0.01 % (0.1 mg/gram) vaginal cream Apply 1-2g (pea-sized amount) vaginally every night for 2-4 weeks, followed by 2-3x/week    LIDOcaine-prilocaine (EMLA) cream SMARTSI Topical Daily PRN    methocarbamoL (ROBAXIN) 500 MG Tab Take 500 mg by mouth nightly as needed.    metroNIDAZOLE (FLAGYL) 500 MG tablet Take 500 mg by mouth 2 (two) times daily.     No current facility-administered medications for this visit.         Review of Systems:  Review of Systems   Constitutional:  Negative for activity change, appetite change, fatigue, fever and unexpected weight change.   Respiratory:  Negative for cough and shortness of breath.    Cardiovascular:  Negative for chest pain and leg swelling.   Gastrointestinal:  Negative for abdominal pain, blood in stool, constipation, diarrhea, nausea and vomiting.   Endocrine: Negative for diabetes, hair loss and hot flashes.   Genitourinary:  Negative for pelvic pain, postcoital bleeding and postmenopausal bleeding.   Musculoskeletal:  Negative for back pain.   Integumentary:  Negative for hair changes, breast mass, nipple discharge and breast skin changes.   Psychiatric/Behavioral:  Negative for sleep disturbance. The patient is not nervous/anxious.    Breast: Negative for mass, mastodynia, nipple discharge and skin changes     OBJECTIVE:     Physical Exam:  Physical Exam  Constitutional:        Appearance: She is well-developed.   HENT:      Head: Normocephalic and atraumatic.   Eyes:      General: No scleral icterus.        Right eye: No discharge.         Left eye: No discharge.      Conjunctiva/sclera: Conjunctivae normal.   Pulmonary:      Effort: Pulmonary effort is normal.      Breath sounds: No stridor.   Chest:      Chest wall: No mass or tenderness.   Breasts:     Breasts are symmetrical.      Right: No inverted nipple, mass, nipple discharge, skin change or tenderness.      Left: No inverted nipple, mass, nipple discharge, skin change or tenderness.   Abdominal:      General: There is no distension.      Palpations: Abdomen is soft.      Tenderness: There is no abdominal tenderness.   Genitourinary:     Labia:         Right: No rash, tenderness, lesion or injury.         Left: No rash, tenderness, lesion or injury.       Vagina: Normal.      Cervix: No discharge.      Adnexa:         Right: No mass, tenderness or fullness.          Left: No mass, tenderness or fullness.        Comments: Normal external genitalia.  Normal hair distribution.  Urethral meatus normal. Uterus, cervix surgically absent.  No adnexal masses or tenderness. Vaginal mucosa with mild atrophy but otherwise normal.  Cuff intact  Musculoskeletal:         General: Normal range of motion.   Skin:     General: Skin is warm and dry.   Neurological:      Mental Status: She is alert and oriented to person, place, and time.   Psychiatric:         Behavior: Behavior normal.         Thought Content: Thought content normal.         Judgment: Judgment normal.         ASSESSMENT:       ICD-10-CM ICD-9-CM    1. Encounter for well woman exam with routine gynecological exam  Z01.419 V72.31       2. Vaginal atrophy  N95.2 627.3 estradioL (ESTRACE) 0.01 % (0.1 mg/gram) vaginal cream      3. Screening mammogram, encounter for  Z12.31 V76.12 Mammo Digital Screening Bilat w/ Bulmaro             Plan:      Kathrine was seen today for well  woman.    Diagnoses and all orders for this visit:    Encounter for well woman exam with routine gynecological exam  - pap smears no longer indicated due to PAVITHRA    Vaginal atrophy  -     estradioL (ESTRACE) 0.01 % (0.1 mg/gram) vaginal cream; Apply 1-2g (pea-sized amount) vaginally every night for 2-4 weeks, followed by 2-3x/week    Screening mammogram, encounter for  -     Mammo Digital Screening Bilat w/ Bulmaro; Future        Orders Placed This Encounter   Procedures    Mammo Digital Screening Bilat w/ Bulmaro       Follow up in about 1 year (around 2/2/2024) for annual.     Counseling time: 15 minutes    Maryellen Lange

## 2023-02-06 ENCOUNTER — PATIENT MESSAGE (OUTPATIENT)
Dept: OBSTETRICS AND GYNECOLOGY | Facility: CLINIC | Age: 53
End: 2023-02-06
Payer: MEDICAID

## 2023-03-24 ENCOUNTER — HOSPITAL ENCOUNTER (OUTPATIENT)
Dept: RADIOLOGY | Facility: OTHER | Age: 53
Discharge: HOME | End: 2023-03-24
Attending: OBSTETRICS & GYNECOLOGY
Payer: MEDICAID

## 2023-03-24 DIAGNOSIS — Z12.31 SCREENING MAMMOGRAM, ENCOUNTER FOR: ICD-10-CM

## 2023-03-24 PROCEDURE — 77067 MAMMO DIGITAL SCREENING BILAT WITH TOMO: ICD-10-PCS | Mod: 26,,, | Performed by: RADIOLOGY

## 2023-03-24 PROCEDURE — 77063 BREAST TOMOSYNTHESIS BI: CPT | Mod: 26,,, | Performed by: RADIOLOGY

## 2023-03-24 PROCEDURE — 77063 MAMMO DIGITAL SCREENING BILAT WITH TOMO: ICD-10-PCS | Mod: 26,,, | Performed by: RADIOLOGY

## 2023-03-24 PROCEDURE — 77067 SCR MAMMO BI INCL CAD: CPT | Mod: 26,,, | Performed by: RADIOLOGY

## 2023-03-24 PROCEDURE — 77067 SCR MAMMO BI INCL CAD: CPT | Mod: TC

## 2023-05-26 ENCOUNTER — TELEPHONE (OUTPATIENT)
Dept: ADMINISTRATIVE | Facility: HOSPITAL | Age: 53
End: 2023-05-26
Payer: COMMERCIAL

## 2023-06-01 ENCOUNTER — HOSPITAL ENCOUNTER (OUTPATIENT)
Dept: RADIOLOGY | Facility: HOSPITAL | Age: 53
Discharge: HOME | End: 2023-06-01
Attending: PODIATRIST
Payer: COMMERCIAL

## 2023-06-01 ENCOUNTER — OFFICE VISIT (OUTPATIENT)
Dept: PODIATRY | Facility: CLINIC | Age: 53
End: 2023-06-01
Payer: COMMERCIAL

## 2023-06-01 VITALS
WEIGHT: 227.31 LBS | BODY MASS INDEX: 41.83 KG/M2 | DIASTOLIC BLOOD PRESSURE: 83 MMHG | HEIGHT: 62 IN | SYSTOLIC BLOOD PRESSURE: 128 MMHG | HEART RATE: 74 BPM

## 2023-06-01 DIAGNOSIS — M79.672 BILATERAL FOOT PAIN: ICD-10-CM

## 2023-06-01 DIAGNOSIS — M79.671 BILATERAL FOOT PAIN: Primary | ICD-10-CM

## 2023-06-01 DIAGNOSIS — R26.81 GAIT INSTABILITY: Primary | ICD-10-CM

## 2023-06-01 DIAGNOSIS — M79.672 BILATERAL FOOT PAIN: Primary | ICD-10-CM

## 2023-06-01 DIAGNOSIS — M79.671 BILATERAL FOOT PAIN: ICD-10-CM

## 2023-06-01 PROCEDURE — 1159F MED LIST DOCD IN RCRD: CPT | Mod: CPTII,S$GLB,, | Performed by: PODIATRIST

## 2023-06-01 PROCEDURE — 73630 XR FOOT COMPLETE 3 VIEW BILATERAL: ICD-10-PCS | Mod: 26,50,, | Performed by: RADIOLOGY

## 2023-06-01 PROCEDURE — 3074F SYST BP LT 130 MM HG: CPT | Mod: CPTII,S$GLB,, | Performed by: PODIATRIST

## 2023-06-01 PROCEDURE — 3079F DIAST BP 80-89 MM HG: CPT | Mod: CPTII,S$GLB,, | Performed by: PODIATRIST

## 2023-06-01 PROCEDURE — 3079F PR MOST RECENT DIASTOLIC BLOOD PRESSURE 80-89 MM HG: ICD-10-PCS | Mod: CPTII,S$GLB,, | Performed by: PODIATRIST

## 2023-06-01 PROCEDURE — 1160F RVW MEDS BY RX/DR IN RCRD: CPT | Mod: CPTII,S$GLB,, | Performed by: PODIATRIST

## 2023-06-01 PROCEDURE — 99999 PR PBB SHADOW E&M-EST. PATIENT-LVL IV: CPT | Mod: PBBFAC,,, | Performed by: PODIATRIST

## 2023-06-01 PROCEDURE — 1160F PR REVIEW ALL MEDS BY PRESCRIBER/CLIN PHARMACIST DOCUMENTED: ICD-10-PCS | Mod: CPTII,S$GLB,, | Performed by: PODIATRIST

## 2023-06-01 PROCEDURE — 73630 X-RAY EXAM OF FOOT: CPT | Mod: TC,50

## 2023-06-01 PROCEDURE — 99999 PR PBB SHADOW E&M-EST. PATIENT-LVL IV: ICD-10-PCS | Mod: PBBFAC,,, | Performed by: PODIATRIST

## 2023-06-01 PROCEDURE — 1159F PR MEDICATION LIST DOCUMENTED IN MEDICAL RECORD: ICD-10-PCS | Mod: CPTII,S$GLB,, | Performed by: PODIATRIST

## 2023-06-01 PROCEDURE — 99203 OFFICE O/P NEW LOW 30 MIN: CPT | Mod: S$GLB,,, | Performed by: PODIATRIST

## 2023-06-01 PROCEDURE — 99203 PR OFFICE/OUTPT VISIT, NEW, LEVL III, 30-44 MIN: ICD-10-PCS | Mod: S$GLB,,, | Performed by: PODIATRIST

## 2023-06-01 PROCEDURE — 3008F PR BODY MASS INDEX (BMI) DOCUMENTED: ICD-10-PCS | Mod: CPTII,S$GLB,, | Performed by: PODIATRIST

## 2023-06-01 PROCEDURE — 3008F BODY MASS INDEX DOCD: CPT | Mod: CPTII,S$GLB,, | Performed by: PODIATRIST

## 2023-06-01 PROCEDURE — 73630 X-RAY EXAM OF FOOT: CPT | Mod: 26,50,, | Performed by: RADIOLOGY

## 2023-06-01 PROCEDURE — 3074F PR MOST RECENT SYSTOLIC BLOOD PRESSURE < 130 MM HG: ICD-10-PCS | Mod: CPTII,S$GLB,, | Performed by: PODIATRIST

## 2023-06-01 RX ORDER — IBUPROFEN 800 MG/1
800 TABLET ORAL 3 TIMES DAILY
COMMUNITY
Start: 2023-01-12 | End: 2023-06-05 | Stop reason: ALTCHOICE

## 2023-06-05 ENCOUNTER — HOSPITAL ENCOUNTER (OUTPATIENT)
Dept: RADIOLOGY | Facility: OTHER | Age: 53
Discharge: HOME | End: 2023-06-05
Attending: FAMILY MEDICINE
Payer: COMMERCIAL

## 2023-06-05 ENCOUNTER — OFFICE VISIT (OUTPATIENT)
Dept: PRIMARY CARE CLINIC | Facility: CLINIC | Age: 53
End: 2023-06-05
Payer: COMMERCIAL

## 2023-06-05 VITALS
RESPIRATION RATE: 20 BRPM | BODY MASS INDEX: 43.3 KG/M2 | DIASTOLIC BLOOD PRESSURE: 76 MMHG | HEIGHT: 62 IN | HEART RATE: 69 BPM | SYSTOLIC BLOOD PRESSURE: 138 MMHG | WEIGHT: 235.31 LBS

## 2023-06-05 DIAGNOSIS — F32.A ANXIETY AND DEPRESSION: ICD-10-CM

## 2023-06-05 DIAGNOSIS — M25.551 ACUTE RIGHT HIP PAIN: Primary | ICD-10-CM

## 2023-06-05 DIAGNOSIS — E66.01 MORBID OBESITY WITH BMI OF 40.0-44.9, ADULT: ICD-10-CM

## 2023-06-05 DIAGNOSIS — F41.9 ANXIETY AND DEPRESSION: ICD-10-CM

## 2023-06-05 DIAGNOSIS — M25.551 ACUTE RIGHT HIP PAIN: ICD-10-CM

## 2023-06-05 PROCEDURE — 99214 OFFICE O/P EST MOD 30 MIN: CPT | Mod: S$GLB,,, | Performed by: FAMILY MEDICINE

## 2023-06-05 PROCEDURE — 1159F MED LIST DOCD IN RCRD: CPT | Mod: CPTII,S$GLB,, | Performed by: FAMILY MEDICINE

## 2023-06-05 PROCEDURE — 1160F RVW MEDS BY RX/DR IN RCRD: CPT | Mod: CPTII,S$GLB,, | Performed by: FAMILY MEDICINE

## 2023-06-05 PROCEDURE — 99214 PR OFFICE/OUTPT VISIT, EST, LEVL IV, 30-39 MIN: ICD-10-PCS | Mod: S$GLB,,, | Performed by: FAMILY MEDICINE

## 2023-06-05 PROCEDURE — 99999 PR PBB SHADOW E&M-EST. PATIENT-LVL IV: ICD-10-PCS | Mod: PBBFAC,,, | Performed by: FAMILY MEDICINE

## 2023-06-05 PROCEDURE — 3075F SYST BP GE 130 - 139MM HG: CPT | Mod: CPTII,S$GLB,, | Performed by: FAMILY MEDICINE

## 2023-06-05 PROCEDURE — 1160F PR REVIEW ALL MEDS BY PRESCRIBER/CLIN PHARMACIST DOCUMENTED: ICD-10-PCS | Mod: CPTII,S$GLB,, | Performed by: FAMILY MEDICINE

## 2023-06-05 PROCEDURE — 1159F PR MEDICATION LIST DOCUMENTED IN MEDICAL RECORD: ICD-10-PCS | Mod: CPTII,S$GLB,, | Performed by: FAMILY MEDICINE

## 2023-06-05 PROCEDURE — 3075F PR MOST RECENT SYSTOLIC BLOOD PRESS GE 130-139MM HG: ICD-10-PCS | Mod: CPTII,S$GLB,, | Performed by: FAMILY MEDICINE

## 2023-06-05 PROCEDURE — 73502 X-RAY EXAM HIP UNI 2-3 VIEWS: CPT | Mod: 26,RT,, | Performed by: RADIOLOGY

## 2023-06-05 PROCEDURE — 73502 X-RAY EXAM HIP UNI 2-3 VIEWS: CPT | Mod: TC,FY,RT

## 2023-06-05 PROCEDURE — 3078F DIAST BP <80 MM HG: CPT | Mod: CPTII,S$GLB,, | Performed by: FAMILY MEDICINE

## 2023-06-05 PROCEDURE — 99999 PR PBB SHADOW E&M-EST. PATIENT-LVL IV: CPT | Mod: PBBFAC,,, | Performed by: FAMILY MEDICINE

## 2023-06-05 PROCEDURE — 3078F PR MOST RECENT DIASTOLIC BLOOD PRESSURE < 80 MM HG: ICD-10-PCS | Mod: CPTII,S$GLB,, | Performed by: FAMILY MEDICINE

## 2023-06-05 PROCEDURE — 3008F PR BODY MASS INDEX (BMI) DOCUMENTED: ICD-10-PCS | Mod: CPTII,S$GLB,, | Performed by: FAMILY MEDICINE

## 2023-06-05 PROCEDURE — 73502 XR HIP WITH PELVIS WHEN PERFORMED, 2 OR 3  VIEWS RIGHT: ICD-10-PCS | Mod: 26,RT,, | Performed by: RADIOLOGY

## 2023-06-05 PROCEDURE — 3008F BODY MASS INDEX DOCD: CPT | Mod: CPTII,S$GLB,, | Performed by: FAMILY MEDICINE

## 2023-06-05 RX ORDER — BUPROPION HYDROCHLORIDE 300 MG/1
300 TABLET ORAL DAILY
Qty: 30 TABLET | Refills: 11 | Status: SHIPPED | OUTPATIENT
Start: 2023-06-05 | End: 2023-10-19 | Stop reason: ALTCHOICE

## 2023-06-05 RX ORDER — KETOROLAC TROMETHAMINE 10 MG/1
10 TABLET, FILM COATED ORAL EVERY 6 HOURS
Qty: 20 TABLET | Refills: 0 | Status: SHIPPED | OUTPATIENT
Start: 2023-06-05 | End: 2023-06-10

## 2023-06-05 NOTE — PROGRESS NOTES
Subjective     Patient ID: Kathrine Ashraf is a 53 y.o. female.    Chief Complaint: Hip Pain    Hip Pain   The incident occurred more than 1 week ago. There was no injury mechanism. The pain is present in the right hip (groin). The pain is at a severity of 9/10. The pain has been Intermittent since onset. Associated symptoms include an inability to bear weight, numbness (hands and feet) and tingling. Pertinent negatives include no loss of sensation. The symptoms are aggravated by weight bearing. She has tried NSAIDs for the symptoms. The treatment provided no relief.   Patient with morbid obesity.  Recent weight loss of 3 lbs.  Walks casually for 1 hour during lunch.  Also reports persistent symptoms of depression.  Review of Systems   Musculoskeletal:  Positive for arthralgias (posterior knee) and leg pain (posterior thigh).   Neurological:  Positive for tingling and numbness (hands and feet).        Objective     Physical Exam  Constitutional:       Appearance: She is obese.   Musculoskeletal:      Right hip: Tenderness (groin) present. Normal range of motion.   Neurological:      Mental Status: She is alert.          Assessment and Plan     1. Acute right hip pain  No injury.  Suspect underlying arthritis  -     X-Ray Hip 2 or 3 views Right (with Pelvis when performed); Future; Expected date: 06/05/2023  -     ketorolac (TORADOL) 10 mg tablet; Take 1 tablet (10 mg total) by mouth every 6 (six) hours. for 5 days  Dispense: 20 tablet; Refill: 0  -    Tylenol Arthritis 650 mg 2 tablet every 8 hours as needed for pain     2. Morbid obesity with BMI of 40.0-44.9, adult  Encourage weight loss.      Anxiety and depression  Persistent symptoms of depression.  Will increase Wellbutrin.  -     buPROPion (WELLBUTRIN XL) 300 MG 24 hr tablet; Take 1 tablet (300 mg total) by mouth once daily.         No follow-ups on file.

## 2023-06-05 NOTE — PROGRESS NOTES
PODIATRY NOTE     PATIENT ID:  Katrhine Ashraf is a 53 y.o. female.     CHIEF CONCERN:   Foot Pain (Bilateral foot pain)           MEDICAL DECISION MAKING:        ICD-10-CM ICD-9-CM    1. Gait instability  R26.81 781.2 Ambulatory referral/consult to Physical/Occupational Therapy      EMG W/ ULTRASOUND AND NERVE CONDUCTION TEST 2 Extremities          I counseled the patient on the patient's conditions, their implications and medical management.    EMG.   Physical therapy referral.   Shoe and activity modification as needed for relief.                  HISTORY OF PRESENT ILLNESS:  Kathrine Ashraf is a 53 y.o. female with concerns regarding: Foot Pain (Bilateral foot pain)  Patient reports symptoms/signs have been present awhile. .   Trauma/injury to the area:  none recalled.   Patient states the pain occurs when weight bearing and standing, direct pressure.   No self treatment yet.         Patient Active Problem List   Diagnosis    Attention deficit disorder (ADD) in adult    Anxiety and depression    Chronic headache disorder    Impingement syndrome of left shoulder    Sciatica of left side    Snoring    Obstructive sleep apnea syndrome           Current Outpatient Medications on File Prior to Visit   Medication Sig Dispense Refill    busPIRone (BUSPAR) 10 MG tablet Take 1 tablet (10 mg total) by mouth 3 (three) times daily as needed. 90 tablet 2    ergocalciferol (ERGOCALCIFEROL) 50,000 unit Cap Take by mouth.      estradioL (ESTRACE) 0.01 % (0.1 mg/gram) vaginal cream Apply 1-2g (pea-sized amount) vaginally every night for 2-4 weeks, followed by 2-3x/week 42.5 g 12    gabapentin (NEURONTIN) 100 MG capsule Take 1 capsule (100 mg total) by mouth every evening. 30 capsule 3    LIDOcaine-prilocaine (EMLA) cream SMARTSI Topical Daily PRN      methocarbamoL (ROBAXIN) 500 MG Tab Take 500 mg by mouth nightly as needed.      multivitamin capsule Take 1 capsule by mouth once daily.      buPROPion (WELLBUTRIN XL) 150 MG  "TB24 tablet Take 1 tablet (150 mg total) by mouth once daily. 30 tablet 11    diclofenac (VOLTAREN) 50 MG EC tablet Take 1 tablet (50 mg total) by mouth 2 (two) times daily as needed (pain). With food (Patient not taking: Reported on 1/12/2023) 30 tablet 2    doxycycline (MONODOX) 100 MG capsule Take 1 capsule (100 mg total) by mouth every 12 (twelve) hours. (Patient not taking: Reported on 6/1/2023) 14 capsule 0    ibuprofen (ADVIL,MOTRIN) 800 MG tablet Take 800 mg by mouth 3 (three) times daily.      metroNIDAZOLE (FLAGYL) 500 MG tablet Take 500 mg by mouth 2 (two) times daily.       No current facility-administered medications on file prior to visit.           Review of patient's allergies indicates:   Allergen Reactions    Cyanocobalamin (vitamin b-12) Rash    Penicillins Anaphylaxis    Sulfamethoxazole-trimethoprim     Sulfa (sulfonamide antibiotics) Rash               ROS:   General ROS: negative for - chills, fever or night sweats  Respiratory ROS: no cough, shortness of breath, or wheezing  Cardiovascular ROS: no chest pain or dyspnea on exertion  Musculoskeletal ROS: positive for - pain in foot - bilateral  Neurological ROS: negative for - impaired coordination/balance and numbness/tingling  Dermatological ROS: negative for pruritus and rash      EXAM:     Vitals:    06/01/23 1445   BP: 128/83   Pulse: 74   Weight: 103.1 kg (227 lb 4.7 oz)   Height: 5' 2" (1.575 m)        General:  Alert and Oriented x 3;  No acute distress      Bilateral  Lower extremity exam:    Vascular:   Dorsalis Pedis:  present   Posterior Tibial:  present  Capillary refill time:  3 seconds  Temperature of toes warm to touch  Edema:  Trace       Neurological:     Sharp touch:  normal  Light touch: normal  Tinels Sign:  Absent  Mulders Click:   Absent        Dermatological:   Skin: warm, moist, and appropriate for age  Wounds/Ulcers:  Absent  Bruising:  Absent  Erythema:  Absent      Musculoskeletal:   Metatarsophalangeal range of " motion:   diminished range of motion  Subtalar joint range of motion: diminished range of motion  Ankle joint range of motion:  full range of motion  Bunions:  minimal  Hammertoes: Present; flexible      6/1/2023   bilateral foot xrays:  FINDINGS:  Limited hypertrophic change left posterior calcaneus Achilles tendon insertion site.     Impression:    No fracture or dislocation.

## 2023-06-10 ENCOUNTER — PATIENT MESSAGE (OUTPATIENT)
Dept: PRIMARY CARE CLINIC | Facility: CLINIC | Age: 53
End: 2023-06-10
Payer: COMMERCIAL

## 2023-06-10 ENCOUNTER — PATIENT MESSAGE (OUTPATIENT)
Dept: PODIATRY | Facility: CLINIC | Age: 53
End: 2023-06-10
Payer: COMMERCIAL

## 2023-06-10 DIAGNOSIS — M25.551 ACUTE RIGHT HIP PAIN: Primary | ICD-10-CM

## 2023-06-12 ENCOUNTER — PATIENT MESSAGE (OUTPATIENT)
Dept: PRIMARY CARE CLINIC | Facility: CLINIC | Age: 53
End: 2023-06-12
Payer: COMMERCIAL

## 2023-06-14 DIAGNOSIS — F41.9 ANXIETY AND DEPRESSION: ICD-10-CM

## 2023-06-14 DIAGNOSIS — F32.A ANXIETY AND DEPRESSION: ICD-10-CM

## 2023-06-15 ENCOUNTER — OFFICE VISIT (OUTPATIENT)
Dept: SPORTS MEDICINE | Facility: CLINIC | Age: 53
End: 2023-06-15
Payer: COMMERCIAL

## 2023-06-15 VITALS — WEIGHT: 234 LBS | BODY MASS INDEX: 43.06 KG/M2 | HEIGHT: 62 IN

## 2023-06-15 DIAGNOSIS — M25.551 ACUTE RIGHT HIP PAIN: ICD-10-CM

## 2023-06-15 PROCEDURE — 99999 PR PBB SHADOW E&M-EST. PATIENT-LVL IV: CPT | Mod: PBBFAC,,, | Performed by: ORTHOPAEDIC SURGERY

## 2023-06-15 PROCEDURE — 1160F RVW MEDS BY RX/DR IN RCRD: CPT | Mod: CPTII,S$GLB,, | Performed by: ORTHOPAEDIC SURGERY

## 2023-06-15 PROCEDURE — 99204 PR OFFICE/OUTPT VISIT, NEW, LEVL IV, 45-59 MIN: ICD-10-PCS | Mod: S$GLB,,, | Performed by: ORTHOPAEDIC SURGERY

## 2023-06-15 PROCEDURE — 99999 PR PBB SHADOW E&M-EST. PATIENT-LVL IV: ICD-10-PCS | Mod: PBBFAC,,, | Performed by: ORTHOPAEDIC SURGERY

## 2023-06-15 PROCEDURE — 1159F MED LIST DOCD IN RCRD: CPT | Mod: CPTII,S$GLB,, | Performed by: ORTHOPAEDIC SURGERY

## 2023-06-15 PROCEDURE — 3008F PR BODY MASS INDEX (BMI) DOCUMENTED: ICD-10-PCS | Mod: CPTII,S$GLB,, | Performed by: ORTHOPAEDIC SURGERY

## 2023-06-15 PROCEDURE — 1160F PR REVIEW ALL MEDS BY PRESCRIBER/CLIN PHARMACIST DOCUMENTED: ICD-10-PCS | Mod: CPTII,S$GLB,, | Performed by: ORTHOPAEDIC SURGERY

## 2023-06-15 PROCEDURE — 3008F BODY MASS INDEX DOCD: CPT | Mod: CPTII,S$GLB,, | Performed by: ORTHOPAEDIC SURGERY

## 2023-06-15 PROCEDURE — 1159F PR MEDICATION LIST DOCUMENTED IN MEDICAL RECORD: ICD-10-PCS | Mod: CPTII,S$GLB,, | Performed by: ORTHOPAEDIC SURGERY

## 2023-06-15 PROCEDURE — 99204 OFFICE O/P NEW MOD 45 MIN: CPT | Mod: S$GLB,,, | Performed by: ORTHOPAEDIC SURGERY

## 2023-06-15 RX ORDER — BUSPIRONE HYDROCHLORIDE 10 MG/1
TABLET ORAL
Qty: 90 TABLET | Refills: 5 | Status: SHIPPED | OUTPATIENT
Start: 2023-06-15

## 2023-06-15 NOTE — PROGRESS NOTES
Subjective:     Chief Complaint: Kathrine Ashraf is a 53 y.o. female who had concerns including Pain of the Right Hip.    HPI    Patient presents to clinic with right hip pain x 2 weeks. Patient states she is a 1 year history of pain within the right leg that originated within the foot and has since radiated up to her hip.  She denies any JAY or traumatic event. Pain is localized along the lateral aspect of the hip and made worse with ambulation.  She is currently seeing a podiatrist and begins formal PT in the next several weeks.  She has also been working on weight loss.. She has attempted multiple conservative measures that include activity modification, ice & elevation, and anti-inflammatories.  She denies any mechanical symptoms to include locking and catching or instability.  Is affecting ADLs and limiting desired level of activity. Denies numbness, tingling, radiation, and inability to bear weight. She is here today to discuss treatment options.    No previous surgeries or trauma on bilateral hip    Patient's BMI today was 42.8       Review of Systems   Constitutional: Negative.   HENT: Negative.     Eyes: Negative.    Cardiovascular: Negative.    Respiratory: Negative.     Endocrine: Negative.    Hematologic/Lymphatic: Negative.    Skin: Negative.    Musculoskeletal:  Positive for joint pain, muscle weakness and stiffness. Negative for falls and joint swelling.   Neurological: Negative.    Psychiatric/Behavioral: Negative.     Allergic/Immunologic: Negative.                Objective:     General: Kathrine is well-developed, well-nourished, appears stated age, in no acute distress, alert and oriented to time, place and person.     General    Nursing note and vitals reviewed.  Constitutional: She is oriented to person, place, and time. She appears well-developed and well-nourished. No distress.   HENT:   Head: Normocephalic and atraumatic.   Nose: Nose normal.   Eyes: EOM are normal.   Cardiovascular:  Intact  distal pulses.            Pulmonary/Chest: Effort normal. No respiratory distress.   Neurological: She is alert and oriented to person, place, and time.   Psychiatric: She has a normal mood and affect. Her behavior is normal. Judgment and thought content normal.           Right Knee Exam     Inspection   Alignment:  normal  Effusion: absent    Left Knee Exam     Inspection   Alignment:  normal  Effusion: absent    Right Hip Exam     Inspection   Scars: absent  Swelling: absent  Bruising: absent  No deformity of hip.  Quadriceps Atrophy:  Negative  Erythema: absent    Range of Motion   Abduction:  45   Adduction:  30   Extension:  20   Flexion:  110   External rotation:  50   Internal rotation:  30     Tests   Pain w/ forced internal rotation (ABHIJIT): present  Pain w/ forced external rotation (FADIR): present  Halina: negative  Trendelenburg Test: negative  Circumduction test: negative  Stinchfield test: positive  Log Roll: negative    Other   Sensation: normal  Left Hip Exam     Inspection   Scars: absent  Swelling: absent  No deformity of hip.  Quadriceps Atrophy:  negative  Erythema: absent  Bruising: absent    Range of Motion   Abduction:  45 normal   Adduction:  30 normal   Extension:  20 normal   Flexion:  110 normal   External rotation:  50 normal   Internal rotation: 30 normal     Tests   Pain w/ forced internal rotation (ABHIJIT): absent  Pain w/ forced external rotation (FADIR): absent  Halina: negative  Trendelenburg Test: negative  Circumduction test: negative  Stinchfield test: negative  Log Roll: negative    Other   Sensation: normal          Muscle Strength   Right Lower Extremity   Hip Abduction: 4/5   Hip Adduction: 4/5   Hip Flexion: 4/5   Ankle Dorsiflexion:  5/5   Left Lower Extremity   Hip Abduction: 5/5   Hip Adduction: 5/5   Hip Flexion: 5/5   Ankle Dorsiflexion:  5/5     Vascular Exam     Right Pulses  Dorsalis Pedis:      2+  Posterior Tibial:      2+        Left Pulses  Dorsalis Pedis:       2+  Posterior Tibial:      2+        Capillary Refill  Left Hand: normal capillary refill        Edema  Right Upper Leg: absent  Left Upper Leg: absent    Radiographs right hip    My interpretation:     No signs of fracture, contusion, swelling, DJD, or any other bony abnormalities noted.      Assessment:     Encounter Diagnoses   Name Primary?    Acute right hip pain     BMI 40.0-44.9, adult Yes        Plan:       1. I made the decision to order new imaging of the extremity or extremities evaluated. I independently reviewed and interpreted the radiographs and/or MRIs today. These images were shown to the patient where I then discussed my findings in detail.    2. We discussed at length different treatment options including conservative vs surgical management. These include anti-inflammatories, acetaminophen, rest, ice, heat, formal physical therapy including strengthening and stretching exercises, home exercise programs, dry needling, and finally surgical intervention.  I recommended she continue formal physical therapy for her foot to see if this addresses hip pain.  I will then see her back upon completion to see how her hip pain is doing.  We can always consider formal physical therapy for her right hip at this point in time.    3. Referral placed to see Trinidad Nichole RD, LDN to discuss diet and weight loss.    4. RTC to see Ramirez Zapata PA-C in 8 weeks for follow-up.  Will reassess hip pain and determine if referral to PT is warranted at that time.      All of the patient's questions were answered. Patient was advised to call the clinic or contact me through the patient portal for any questions or concerns.       Medical Dictation software was used during the dictation of portions or the entirety of this medical record.  Phonetic or grammatic errors may exist due to the use of this software. For clarification, refer to the author of the document.          Patient questionnaires may have been collected.

## 2023-06-21 ENCOUNTER — OFFICE VISIT (OUTPATIENT)
Dept: PODIATRY | Facility: CLINIC | Age: 53
End: 2023-06-21
Payer: COMMERCIAL

## 2023-06-21 ENCOUNTER — TELEPHONE (OUTPATIENT)
Dept: PODIATRY | Facility: CLINIC | Age: 53
End: 2023-06-21
Payer: COMMERCIAL

## 2023-06-21 DIAGNOSIS — M79.672 BILATERAL FOOT PAIN: ICD-10-CM

## 2023-06-21 DIAGNOSIS — M79.671 BILATERAL FOOT PAIN: ICD-10-CM

## 2023-06-21 DIAGNOSIS — R26.81 GAIT INSTABILITY: Primary | ICD-10-CM

## 2023-06-21 PROCEDURE — 1159F PR MEDICATION LIST DOCUMENTED IN MEDICAL RECORD: ICD-10-PCS | Mod: CPTII,95,, | Performed by: PODIATRIST

## 2023-06-21 PROCEDURE — 1160F RVW MEDS BY RX/DR IN RCRD: CPT | Mod: CPTII,95,, | Performed by: PODIATRIST

## 2023-06-21 PROCEDURE — 99213 OFFICE O/P EST LOW 20 MIN: CPT | Mod: 95,,, | Performed by: PODIATRIST

## 2023-06-21 PROCEDURE — 1160F PR REVIEW ALL MEDS BY PRESCRIBER/CLIN PHARMACIST DOCUMENTED: ICD-10-PCS | Mod: CPTII,95,, | Performed by: PODIATRIST

## 2023-06-21 PROCEDURE — 1159F MED LIST DOCD IN RCRD: CPT | Mod: CPTII,95,, | Performed by: PODIATRIST

## 2023-06-21 PROCEDURE — 99213 PR OFFICE/OUTPT VISIT, EST, LEVL III, 20-29 MIN: ICD-10-PCS | Mod: 95,,, | Performed by: PODIATRIST

## 2023-06-21 NOTE — PROGRESS NOTES
PODIATRY NOTE     VIRTUAL VISIT:      Audio Only Telehealth Visit     The patient location is: [place]   The chief complaint leading to telehealth visit is: bilateral foot pain    Visit type: Virtual visit with audio only (telephone)  Total time spent with patient: 22 minutes (17mins phone and 5 minutes chart review and charting)     The reason for the audio only service rather than synchronous audio and video virtual visit was related to technical difficulties or patient preference/necessity.     Each patient to whom I provide medical services by telemedicine is:  (1) informed of the relationship between the physician and patient and the respective role of any other health care provider with respect to management of the patient; and (2) notified that they may decline to receive medical services by telemedicine and may withdraw from such care at any time. Patient verbally consented to receive this service via voice-only telephone call.       HPI:   Kathrine Ashraf is a 53 y.o. female with concerns regarding: Foot Pain (Bilateral foot pain right worse than left ).   Patient reports symptoms/signs have been present awhile. .   Trauma/injury to the area:  none recalled.   Patient states the pain occurs when weight bearing and standing, direct pressure.    No self treatment yet.        Assessment and plan:    Gait instability    Bilateral foot pain        Xrays reviewed.  No significant abnormalities.   Start Physical therapy.  She is scheduled.   EMG order.  Needs to be scheduled.   Patient will follow up with Neurology.   Shoe and activity modification as needed for relief.   Discussed proper shoewear.  No flips flops or flats.   No barefoot walking.                 This service was not originating from a related E/M service provided within the previous 7 days nor will  to an E/M service or procedure within the next 24 hours or my soonest available appointment.  Prevailing standard of care was able to be met  in this audio-only visit.

## 2023-06-21 NOTE — TELEPHONE ENCOUNTER
----- Message from Mila England sent at 6/21/2023  4:24 PM CDT -----  Name of Who is Calling:KIMBERLEY BADILLO [[id number]]              What is the request in detail:Patient is stuck in traffic and won't be able to make it in time to get on the call. If she does it from her car it will just be a phone call. Please assist.               Can the clinic reply by [facility]:              What Number to Call Back if not in [facility]:[phone]

## 2023-06-22 ENCOUNTER — TELEPHONE (OUTPATIENT)
Dept: SPORTS MEDICINE | Facility: CLINIC | Age: 53
End: 2023-06-22
Payer: COMMERCIAL

## 2023-06-22 NOTE — TELEPHONE ENCOUNTER
Called and spoke to patient. In regards to appointment with Dr. Serrano today at 4:00.  Patient states she would like to wait till she sees her PCP

## 2023-07-03 ENCOUNTER — CLINICAL SUPPORT (OUTPATIENT)
Dept: REHABILITATION | Facility: OTHER | Age: 53
End: 2023-07-03
Attending: PODIATRIST
Payer: COMMERCIAL

## 2023-07-03 DIAGNOSIS — R26.81 GAIT INSTABILITY: ICD-10-CM

## 2023-07-03 DIAGNOSIS — R29.898 WEAKNESS OF BOTH LOWER EXTREMITIES: ICD-10-CM

## 2023-07-03 PROCEDURE — 97110 THERAPEUTIC EXERCISES: CPT | Mod: PN

## 2023-07-03 PROCEDURE — 97161 PT EVAL LOW COMPLEX 20 MIN: CPT | Mod: PN

## 2023-07-03 NOTE — PROGRESS NOTES
[facility]  Physical Therapy Initial Evaluation    Name: Kathrine Ashraf  Clinic Number: [id number]    Therapy Diagnosis:   Encounter Diagnosis   Name Primary?    Gait instability      Physician: Monse Daniels DPM    Physician Orders: Physical Therapy Evaluate and Treat  Medical Diagnosis from Referral: gait disturbance  Evaluation Date: 7/3/23  Authorization Period Expiration: 24  Plan of Care Expiration: 7/3/2023 to 10/3/23  Visit # / Visits authorized:  (pending additional authorization following initial evaluation)   FOTO: 1/3  on 7/3/23      Time In: 455p  Time Out: 550p  Total Billable Time: 55 minutes    Precautions: standard    Subjective     History of current condition - Kathrine reports: mid line Lower back pain, right hip pain, back of knee pain, and forefoot pain    Walking from parking garage to office is painful    Foot pain is worst, hip pain is next    Foot pain and posterior knee pain x 1 year, hip/groin pain x 1 month       Medical History:   Past Medical History:   Diagnosis Date    Allergy     Anxiety        Surgical History:   Kathrine Ashraf  has a past surgical history that includes Tubal ligation; Hysterectomy;  section; and Colonoscopy (N/A, 2022).    Medications:   Kathrine has a current medication list which includes the following prescription(s): bupropion, buspirone, ergocalciferol, estradiol, gabapentin, lidocaine-prilocaine, methocarbamol, metronidazole, and multivitamin.    Allergies:   Review of patient's allergies indicates:   Allergen Reactions    Cyanocobalamin (vitamin b-12) Rash    Penicillins Anaphylaxis    Sulfamethoxazole-trimethoprim     Sulfa (sulfonamide antibiotics) Rash        Imaging: No evidence of an acute fracture or dislocation.  Mild acetabular osteophytosis.  No radiopaque foreign body.    Prior Therapy: No  Social History: 54 yo female  Occupation: works at a desk on the computer  Prior Level of Function: Ind with ADLs,  work tasks, and recreational activity  Current Level of Function:  limited with community distance ambulation and recreational activity 2nd to pain    Pain:  Current 8/10, worst 10/10, best 3/10   Location:  right foot, right anterior hip  Description: Aching  Aggravating Factors: walking, AM worst, after sitting a long time, flat shoes  Easing Factors: tumeric, NSAIDs, shoes with heel    Pts goals: Pt would like to return to walking with no increase in foot, knee, hip, low back pain.    Objective     WNL=within normal limits  WFL=within functional limits  NT=not tested  *=pain    Posture: WNL  Palpation: tenderness to palpation at bilateral L3-5 multifidi  Sensation: intact  Deep tendon reflexes: NT    Lumbar Active range of motion  Pain/dysfunction with movement:   Flexion 75    Extension 50    Right side bending 100    Left side bending 75  Left LBP   Right rotation 100    Left rotation 100          Right LE   Left LE      Iliopsoas:  L2 3/5 Iliopsoas: L2 4/5    Quadriceps:  L3 - femoral nerve 3/5 Quadriceps: L3 - femoral nerve 4/5    Hip adduction:  L3 - obterator 4/5 Hip adduction: L3 - obterator 5/5    Hamstrings:  S2 4/5 Hamstrings: S2 4/5    Ankle DF/EV:  L4 4/5 Ankle DF/EV: L4 4/5    GT Ext:  L5 4/5 GT Ext L5 4/5    Hip Abduction:  L5-S1 - Superior gluteal nerve 3/5  Hip Abduction: L5-S1 - Superior gluteal nerve 3/5    Hip extension:  L5-S2 - Inferior gluteal nerve 3/5 Hip extension: L5-S2 - Inferior gluteal nerve 3/5    Ankle PF:   S1 - tibial nerve NT Ankle PF S1 - tibial nerve NT         Slump R: positive for right anterior thigh pain and gastroc tightness  Slump L: positive for left gastroc and posterior knee tightness    SLR R: positive for right anterior thigh pain and gastroc tightness  SLR L: positive for left gastroc and posterior knee tightness      Joint mobility:   Thoracic: NT  Lumbar: hypermobility with segmental PA      Gait analysis: WNL    + thigh thrust bilateral    Tenderness to  palpation at bilateral L3-5 S1-2 multifidi, bilateral posterior hip, bilateral greater trochanter     No tenderness to palpation at medial calcaneus bilaterally      CMS Impairment/Limitation/Restriction for FOTO Survey    Therapist reviewed FOTO scores for Kathrine Ashraf on 7/3/2023.   FOTO documents entered into [employer] - see Media section.    Limitation Score: 77%  Predicted Goal: 57%    Category: Mobility     TREATMENT     Treatment Time In: 540p  Treatment Time Out: 555p  Total Treatment time separate from Evaluation: 15 minutes    Therapeutic Exercises were provided for 15 minutes to improve strength and AROM including:    TA activation x10  Bridge with belt x10  Marching with TA activation x10  SLR x10  SL hip abduction x10  SL clamshell x10  Prone hip extension     Home Exercises and Patient Education Provided:    Education provided:   - Findings; prognosis and plan of care (POC)  - Home exercise program (HEP)  - Modality options  - Therapist contact information    Written Home Exercises Provided: Yes  Exercises were reviewed and Kathrine was able to demonstrate them prior to the end of the session.  Kathrine demonstrated good understanding of the education provided.       Assessment     Kathrine is a 53 y.o. female referred to outpatient Physical Therapy with a medical diagnosis of gait disturbance. Pt presents to PT with pain, decreased lumbar ROM, decreased strength and flexibility, poor posture, and functional deficits with standing/walking. These deficits are negatively impacting this patient's ability to complete their work duties and activities of daily living.     Pt prognosis is Good.   Pt will benefit from skilled outpatient Physical Therapy to address the deficits stated above and in the chart below, provide pt/family education, and to maximize pt's level of independence.     Plan of care discussed with patient: Yes  Pt's spiritual, cultural and educational needs considered and pt agreeable to plan of care and  goals as stated below:     Anticipated Barriers for therapy: None      Medical Necessity is demonstrated by the following  History  Co-morbidities and personal factors that may impact the plan of care Co-morbidities:   NA    Personal Factors:   no deficits     low   Examination  Body Structures and Functions, activity limitations and participation restrictions that may impact the plan of care Body Regions:   back  lower extremities    Body Systems:    ROM  strength  transfers  transitions  motor control    Participation Restrictions:   Walking    Activity limitations:   Learning and applying knowledge  no deficits    General Tasks and Commands  No Deficits    Communication  No Deficits    Mobility  walking    Self care  no deficits    Domestic Life  No Deficits    Interactions/Relationships  No Deficits    Life Areas  No Deficits    Community and Social Life  No Deficits         low   Clinical Presentation stable and uncomplicated low   Decision Making/ Complexity Score: low     GOALS:  Short Term Goals (4 Weeks):  1. Patient will be compliant with home exercise program to supplement therapy in promoting functional mobility. (progressing, not met)    2. Patient will perform transfers/walking with good control to demonstrate improved core strength. (progressing, not met)    3. Patient will report no pain during thoracolumbar active range of motion to promote functional mobility.  (progressing, not met)    4. Patient will improve impaired lower extremity manual muscle tests  to >/=4/5 to improve strength for functional tasks. (progressing, not met)        Long Term Goals (6 Weeks):   1. Patient will improve FOTO score to </= 57% limited to decrease perceived limitation with maintaining/changing body position. (progressing, not met)    2. Patient will perform walking with good control to demonstrate improved core strength.  (progressing, not met)    3. Patient will improve impaired lower extremity manual muscle tests  to >/=4+/5 to improve strength for functional tasks.  (progressing, not met)    4. Patient will tolerate standing for 60 minutes with no increase in low back pain to return to PLOF.  (progressing, not met)          Plan     Plan of care Certification: 7/3/2023 to 10/3/23    Outpatient Physical Therapy 2 times weekly for 12 weeks to include the following interventions: Therapeutic Exercises, Manual Therapeutic Technique, Neuromuscular Re Education, Therapeutic Activities. Modalities, Kinesiotape prn, and Functional Dry Needling as needed.    Lico Arriaga, PT,  DPT, OCS

## 2023-07-05 PROBLEM — R29.898 LEG WEAKNESS: Status: ACTIVE | Noted: 2023-07-05

## 2023-07-10 ENCOUNTER — CLINICAL SUPPORT (OUTPATIENT)
Dept: REHABILITATION | Facility: OTHER | Age: 53
End: 2023-07-10
Payer: COMMERCIAL

## 2023-07-10 DIAGNOSIS — R29.898 WEAKNESS OF BOTH LOWER EXTREMITIES: Primary | ICD-10-CM

## 2023-07-10 PROCEDURE — 97112 NEUROMUSCULAR REEDUCATION: CPT | Mod: PN

## 2023-07-10 PROCEDURE — 97110 THERAPEUTIC EXERCISES: CPT | Mod: PN

## 2023-07-10 PROCEDURE — 97530 THERAPEUTIC ACTIVITIES: CPT | Mod: PN

## 2023-07-10 NOTE — PROGRESS NOTES
"[facility]   Physical Therapy Treatment Note     Name: Kathrine Ashraf  Clinic Number: [id number]    Therapy Diagnosis:   Encounter Diagnosis   Name Primary?    Weakness of both lower extremities Yes     Physician: Monse Daniels DPM    Visit Date: 7/10/2023    Physician Orders: Physical Therapy Evaluate and Treat  Medical Diagnosis from Referral: gait disturbance  Evaluation Date: 7/3/23  Authorization Period Expiration: 5/31/24  Plan of Care Expiration: 7/3/2023 to 10/3/23  Visit # / Visits authorized: 1/20   PTA Visit #: 0/5     Precautions: Standard    Time In: 4:00 pm  Time Out: 5:00 pm  Total Billable Time: 60 minutes      SUBJECTIVE     Pt reports: she continues to feel the pain in her low back-most notable after sitting at work   She was compliant with home exercise program.  Response to previous treatment: No change   Functional change: None    Pain: 3/10  Location: low back     OBJECTIVE     Objective Measures updated at progress report unless specified.       TREATMENT     Kathrine received the treatments listed below:        therapeutic activities to improve functional performance for 15 minutes, including:  +Education and practice on proper core activation w/ transitional movements (supine<>sit, sit to stand)     received therapeutic exercises to develop strength and ROM for 15 minutes including:  JAYCOB x 4 minutes   Prone hip extension x15 B   Standing extensions to tolerance x 10       neuromuscular re-education activities to improve: Coordination, Sense, and Proprioception for 30 minutes. The following activities were included:  TA activation x10-heavy cues for correct technique   Bridge with belt 10x5"  Marching with TA activation 2x10  SLR w/ TrA activation x10 B  SL hip abduction x15  SL clamshell x15    received the following manual therapy techniques: Soft tissue Mobilization were applied to the: 0 minutes, including:      PATIENT EDUCATION AND HOME EXERCISES     Home " Exercises Provided and Patient Education Provided     Education provided:       Written Home Exercises Provided: yes. Exercises were reviewed and Kathrine was able to demonstrate them prior to the end of the session.  Kathrine demonstrated good  understanding of the education provided. See EMR under Patient Instructions for exercises provided during therapy sessions    ASSESSMENT   Ms. Ashraf returned to PT for her first follow up since initial evaluation. Significant symptom improvement noted with prone on elbows. Added to HEP with return demonstration observed. Plan to continue to progress extension based exercises, functional strengthening, and core stabilization exercises per pts tolerance.       Kathrine Is progressing well towards her goals.   Pt prognosis is Good.     Pt will continue to benefit from skilled outpatient physical therapy to address the deficits listed in the problem list box on initial evaluation, provide pt/family education and to maximize pt's level of independence in the home and community environment.     Pt's spiritual, cultural and educational needs considered and pt agreeable to plan of care and goals.     Anticipated barriers to physical therapy: None      GOALS:  Short Term Goals (4 Weeks):  1. Patient will be compliant with home exercise program to supplement therapy in promoting functional mobility. (progressing, not met)    2. Patient will perform transfers/walking with good control to demonstrate improved core strength. (progressing, not met)    3. Patient will report no pain during thoracolumbar active range of motion to promote functional mobility.  (progressing, not met)    4. Patient will improve impaired lower extremity manual muscle tests  to >/=4/5 to improve strength for functional tasks. (progressing, not met)          Long Term Goals (6 Weeks):   1. Patient will improve FOTO score to </= 57% limited to decrease perceived limitation with maintaining/changing body position.  (progressing, not met)    2. Patient will perform walking with good control to demonstrate improved core strength.  (progressing, not met)    3. Patient will improve impaired lower extremity manual muscle tests to >/=4+/5 to improve strength for functional tasks.  (progressing, not met)    4. Patient will tolerate standing for 60 minutes with no increase in low back pain to return to PLOF.  (progressing, not met)      PLAN   Plan of care Certification: 7/3/2023 to 10/3/23    Norris Haley PT

## 2023-07-12 ENCOUNTER — CLINICAL SUPPORT (OUTPATIENT)
Dept: NUTRITION | Facility: CLINIC | Age: 53
End: 2023-07-12
Payer: COMMERCIAL

## 2023-07-12 PROCEDURE — 97802 PR MED NUTR THER, 1ST, INDIV, EA 15 MIN: ICD-10-PCS | Mod: 95,,, | Performed by: DIETITIAN, REGISTERED

## 2023-07-12 PROCEDURE — 97802 MEDICAL NUTRITION INDIV IN: CPT | Mod: 95,,, | Performed by: DIETITIAN, REGISTERED

## 2023-07-14 NOTE — PROGRESS NOTES
"The patient location is: pt car during lunch break  The chief complaint leading to consultation is: desired weight loss and decreasing joint pain    Visit type: audiovisual    Face to Face time with patient: 60 minutes  90 minutes of total time spent on the encounter, which includes face to face time and non-face to face time preparing to see the patient (eg, review of tests), Obtaining and/or reviewing separately obtained history, Documenting clinical information in the electronic or other health record, Independently interpreting results (not separately reported) and communicating results to the patient/family/caregiver, or Care coordination (not separately reported).         Each patient to whom he or she provides medical services by telemedicine is:  (1) informed of the relationship between the physician and patient and the respective role of any other health care provider with respect to management of the patient; and (2) notified that he or she may decline to receive medical services by telemedicine and may withdraw from such care at any time.    Notes:   Nutrition Assessment    Visit Type: Insurance initial  Session Time:  1 Hour 30 Minutes  Reason for MNT visit: Pt in for education and nutrition counseling regarding Obesity and desired 60 lb weight loss and navigating menopause .       Age: 53 y.o.  Wt:   Wt Readings from Last 1 Encounters:   06/15/23 106.1 kg (234 lb)     Ht:   Ht Readings from Last 1 Encounters:   06/15/23 5' 2" (1.575 m)     BMI:   BMI Readings from Last 1 Encounters:   06/15/23 42.80 kg/m²       Client states:  Pt reports that she is noticing that she is having a hard time moving around as easy as she used to. Her joints are becoming more achy. She stated that she goes through periods of doing well for two weeks, and then goes back to her sugar cravings and indulging    Medical History  Problem List             Resolved    Attention deficit disorder (ADD) in adult         Anxiety and " depression         Chronic headache disorder         Impingement syndrome of left shoulder         Sciatica of left side         Snoring         Obstructive sleep apnea syndrome         Leg weakness            Past Medical History:   Diagnosis Date    Allergy     Anxiety        Past Surgical History:   Procedure Laterality Date     SECTION      COLONOSCOPY N/A 2022    Procedure: COLONOSCOPY;  Surgeon: Franky Murcia MD;  Location: [address]);  Service: Endoscopy;  Laterality: N/A;  -covid [facility]    HYSTERECTOMY      partial    TUBAL LIGATION            Medications    Prior to Admission medications    Medication Sig Start Date End Date Taking? Authorizing Provider   buPROPion (WELLBUTRIN XL) 300 MG 24 hr tablet Take 1 tablet (300 mg total) by mouth once daily. 23   Naomi Franklin MD   busPIRone (BUSPAR) 10 MG tablet TAKE 1 TABLET(10 MG) BY MOUTH THREE TIMES DAILY AS NEEDED 6/15/23   Naomi Franklin MD   ergocalciferol (ERGOCALCIFEROL) 50,000 unit Cap Take by mouth.    Historical Provider   estradioL (ESTRACE) 0.01 % (0.1 mg/gram) vaginal cream Apply 1-2g (pea-sized amount) vaginally every night for 2-4 weeks, followed by 2-3x/week 23   Maryellen Lange MD   gabapentin (NEURONTIN) 100 MG capsule Take 1 capsule (100 mg total) by mouth every evening. 22  Yojana Mcgee DNP   LIDOcaine-prilocaine (EMLA) cream SMARTSI Topical Daily PRN 10/5/22   Historical Provider   methocarbamoL (ROBAXIN) 500 MG Tab Take 500 mg by mouth nightly as needed. 22   Historical Provider   metroNIDAZOLE (FLAGYL) 500 MG tablet Take 500 mg by mouth 2 (two) times daily. 22   Historical Provider   multivitamin capsule Take 1 capsule by mouth once daily.    Historical Provider        Vitamins, Minerals, and/or Supplements:  MVI, Mg, fish oil, vitamin C, turmeric     Food Allergies or Intolerances:  NKFA     Social History    Marital status:       Social History     Tobacco Use    Smoking status: Never    Smokeless tobacco: Never   Substance Use Topics    Alcohol use: Yes     Comment: Socially     Current Alcohol use: 1-2 x per week 1 glass wine, sometimes on the weekend    Lab Reports   Reviewed and noted    Lab Results   Component Value Date    TSH 0.687 07/17/2020    FREET4 1.04 09/23/2004    CRP 2.7 07/17/2020    SEDRATE 24 07/17/2020    AST 37 12/02/2022    ALT 62 (H) 12/02/2022     (H) 12/02/2022    LDLCALC 163.0 (H) 12/02/2022    TRIG 70 12/02/2022    HGBA1C 5.4 04/21/2022    HGBA1C 5.0 07/17/2020         BP Readings from Last 1 Encounters:   06/05/23 138/76        24-hour Recall        Food choices (After Midnight)  Patient eating midnight to 4am: (P) Never                  Food choices (Early Morning)  Patient eats 4am to 8am: (P) Once or twice a month       Fast food meals (4am - 8am): (P) Fried Chicken   Snacks (4am - 8am): (P) Chocolate candy, ice cream, cake and cookies      Food choices (Morning)  Patient eats 8am to noon: (P) Every day   Home cooked meals (8am - noon): (P) Boiled Egg, Premiere Protein Shake, [place] [place] a, Subway, Salad   Fast food meals (8am - midnight): (P) Popeyes, [employer] [place] a, subway   Snacks (8am - noon): (P) Chocolate candy, ice cream, cake and cookies     Food choices (Afternoon)  Patient eats noon to 4pm: (P) Every day   Home cooked meals (Noon - 4pm): (P) None   Snacks (Noon to 4pm): (P) Chocolate candy, ice cream, cake and cookies     Food choices (Evening)   Patient eats 4pm to 8pm: (P) Every day   Home cooked meals (4pm - 8pm): (P) [place], spaghetti & meat sauce, canned beans, soup   Fast food meals (4pm - 8pm): (P) Popeyes, kfc, taco bell   Snacks (4pm - 8pm): (P) Chocolate candy, ice cream, cake and cookies     Food choices (Late evening)  R [facility] PEQ NUTRITION HOW OFTEN EATING LATE EVENING: (P) Every day                  Beverages  How much water consumed (per day?): (P) 5   Cups of milk consumed  (per day?): (P) 0       Cups of  juice consumed (per day?): (P) 0   Number of supplement shakes (per day?): (P) 1   Types of Supplemental Shakes: (P) Premiere Protein   Number of cups of coffee (per day?): (P) 2   Coffee: (P) Caffinated, Flavorings (Vanilla, Mocha, Caramel etc.)   Tea:: (P) Lemon   Cups of soda consumed (per day?): (P) 0   Other non-alcoholic beverages consumed (per day?): (P) 1   Types of other beverages: (P) Simpley Lemonade, ice tea, water, coffee, red or white wine      LIFESTYLE FACTORS    Dinning out: 4-6 x per month, mostly fast food    Meal preparation/shopping: Who does the grocery shopping:: (P) Me Who prepares the meals: (P) Me     Sleep: fair, struggle to fall asleep, and often waking in the middle of the night, often getting about 5 hours of sleep a night    Stress Level: high    Support System:   Self , pt reports having a challenging relationship with her children    Exercise Regimen:  Pt reports doing 40-60 minutes of walking a day collectively. Would like to do more once she loses a little weight.      Diagnosis    Obesity related to excessive caloric intake as evidenced by high consumption of fast food and sweets reviewed in food recall.      Intervention    Estimated Energy Requirements:   Calories: 1700  Protein: 130-150 g  Carbohydrates: 150-170 g  Total Fat: 55-65 g  Baseline for fluids: 120 fl ounces    Recommendations & Goals:  Patient goals and recommendations are tailored to the specific patient's needs, readiness to change, lifestyle, culture, skills, resources, & abilities. Strategies to help achieve these nutrition-related goals were discussed which can include but are not limited to SMART goal setting & mindful eating.     Aim for a minimum of 7 hours sleep   Exercise 60 minutes most days  Eat breakfast within 1-2 hours of waking up  Try not to skip any meals or snacks, not going more than 3-4 hours without eating   At each meal and snack, try to include a source of  fiber + lean protein + healthy fat     Written Materials Provided  These resources are intended to assist the patient in making it easier to choose recommended options when eating out & to identify better-for-you brands at the grocery store:    Meal Planning Guide with recommendations discussed along with portion sizes and a customized meal plan   Fueling Well On-the-Go Food Guide  Eat Fit Shopping Guide  Lifestyle Nutrition Meal Guide  RD contact information    Goals:  1.  Implement nutrition guidelines   2.  Decrease added sugar  3.  Meal prep and go to the grocery store      Monitoring/Evaluation    Monitor the following:  Weight  Sleep  Stress Management  Movement  Nutrient intake in reference to meal plan    Communicated with healthcare provider and documented plan for referral to appropriate agency/healthcare provider as needed    Patient motivation, anticipated barriers, expected compliance: Patient is motivated and has verbalized understanding and intent to comply.     Comprehension: good     Follow-up: 4-6 weeks

## 2023-07-14 NOTE — PATIENT INSTRUCTIONS
Name: Kathrine Ashraf   Date: 07/12/2023    Recommended daily energy requirements to reach your goals:  1700 Calories  130-150 grams Protein  150-170 grams Carbohydrates  55-65 grams Fat  120 ounces of fluid per day

## 2023-07-16 ENCOUNTER — OFFICE VISIT (OUTPATIENT)
Dept: URGENT CARE | Facility: CLINIC | Age: 53
End: 2023-07-16
Payer: COMMERCIAL

## 2023-07-16 VITALS
SYSTOLIC BLOOD PRESSURE: 134 MMHG | BODY MASS INDEX: 43.06 KG/M2 | WEIGHT: 234 LBS | RESPIRATION RATE: 16 BRPM | HEIGHT: 62 IN | TEMPERATURE: 98 F | HEART RATE: 65 BPM | DIASTOLIC BLOOD PRESSURE: 78 MMHG | OXYGEN SATURATION: 100 %

## 2023-07-16 DIAGNOSIS — R53.83 FATIGUE, UNSPECIFIED TYPE: ICD-10-CM

## 2023-07-16 DIAGNOSIS — B34.9 ACUTE VIRAL SYNDROME: ICD-10-CM

## 2023-07-16 DIAGNOSIS — E66.01 MORBID OBESITY: ICD-10-CM

## 2023-07-16 DIAGNOSIS — M16.11 ARTHRITIS OF RIGHT HIP: ICD-10-CM

## 2023-07-16 DIAGNOSIS — R10.31 RIGHT GROIN PAIN: Primary | ICD-10-CM

## 2023-07-16 DIAGNOSIS — R11.0 NAUSEA WITHOUT VOMITING: ICD-10-CM

## 2023-07-16 LAB
BILIRUB UR QL STRIP: NEGATIVE
GLUCOSE UR QL STRIP: NEGATIVE
KETONES UR QL STRIP: NEGATIVE
LEUKOCYTE ESTERASE UR QL STRIP: NEGATIVE
PH, POC UA: 6.5 (ref 5–8)
POC BLOOD, URINE: NEGATIVE
POC NITRATES, URINE: NEGATIVE
PROT UR QL STRIP: NEGATIVE
SP GR UR STRIP: 1.02 (ref 1–1.03)
UROBILINOGEN UR STRIP-ACNC: NORMAL (ref 0.1–1.1)

## 2023-07-16 PROCEDURE — 81003 URINALYSIS AUTO W/O SCOPE: CPT | Mod: QW,S$GLB,, | Performed by: PHYSICIAN ASSISTANT

## 2023-07-16 PROCEDURE — 99214 OFFICE O/P EST MOD 30 MIN: CPT | Mod: S$GLB,,, | Performed by: PHYSICIAN ASSISTANT

## 2023-07-16 PROCEDURE — 74019 RADEX ABDOMEN 2 VIEWS: CPT | Mod: S$GLB,,, | Performed by: RADIOLOGY

## 2023-07-16 PROCEDURE — 81003 POCT URINALYSIS, DIPSTICK, AUTOMATED, W/O SCOPE: ICD-10-PCS | Mod: QW,S$GLB,, | Performed by: PHYSICIAN ASSISTANT

## 2023-07-16 PROCEDURE — 99214 PR OFFICE/OUTPT VISIT, EST, LEVL IV, 30-39 MIN: ICD-10-PCS | Mod: S$GLB,,, | Performed by: PHYSICIAN ASSISTANT

## 2023-07-16 PROCEDURE — 74019 XR ABDOMEN FLAT AND ERECT: ICD-10-PCS | Mod: S$GLB,,, | Performed by: RADIOLOGY

## 2023-07-16 RX ORDER — ONDANSETRON 4 MG/1
4 TABLET, ORALLY DISINTEGRATING ORAL EVERY 12 HOURS PRN
Qty: 8 TABLET | Refills: 0 | Status: SHIPPED | OUTPATIENT
Start: 2023-07-16 | End: 2023-09-22

## 2023-07-16 NOTE — PATIENT INSTRUCTIONS
PLEASE READ YOUR DISCHARGE INSTRUCTIONS ENTIRELY AS IT CONTAINS IMPORTANT INFORMATION.  - take Zofran as needed for nausea  - OTC Tylenol/anti-inflammatory as needed for pain (see below). These medications can be obtained over the counter at any local pharmacy without requiring a prescription.   OTC ORAL medications for pain reliever or fever reducing:  - Acetaminophen (tylenol 650mg every 8 hour as needed with food) or Ibuprofen (advil,motrin 400-600mg every 8 hour with food as needed) as directed as needed for fever/pain. Avoid tylenol if you have a history of liver disease or allergic reactions. Do not take ibuprofen if you have a history of allergic reactions, stomach bleeding ulcers, kidney disease, or if you take blood thinners.  -The patient was advised that NSAID-type medications have two very important potential side effects: gastrointestinal irritation including hemorrhage and renal injuries. patient was asked to take the medication with food and to stop if patient experiences any GI upset. I asked patient to call for vomiting, abdominal pain or black/bloody stools. The patient expresses understanding of these issues and all questions were answered.  OTC TOPICAL meds for pain reliever :  -You can apply OTC diclofenac or Volatren Gel 2-3 times daily to muscle or joints.  -You can also apply OTC lidocaine 4-5% with OR without menthol ointment 2-3 daily to muscle or joints.  -Please let one absorb before using the other. Do not use both at the same time as it may decrease efficacy. Please stop using if you develop any skin rash or irritation.  -Please wash your hands after application of these topical products.   - continue heat (muscle) /ice (bone/joint) compression, rice therapy, and muscle stretches.   - Radiographs and all diagnostic testing reviewed with patient  - if no improvement or worsening symptoms, recommend follow-up with *orthopedics or PCP for further evaluation.  Please call the number below  to set up appointment; if a referral has been placed.  - Follow up with your PCP or specialty clinic as directed.  You can call [phone] or [phone] to schedule an appointment with the appropriate provider.      - discussed weight loss given obesity  -You must understand that you've received an Urgent Care treatment only and that you may be released before all your medical problems are known or treated. You, the patient, will arrange for follow up care as instructed. Please arrange follow up with your primary medical clinic within 2-5 days if your signs and symptoms have not resolved or worsen.   - If your condition worsens or fails to improve we recommend that you receive another evaluation at the emergency room immediately or contact your primary medical clinic to discuss your concerns in next 2-5 days.  Strict ER versus clinic precautions given.      RED FLAGS/WARNING SYMPTOMS DISCUSSED WITH PATIENT THAT WOULD WARRANT EMERGENT MEDICAL ATTENTION. Patient aware and verbalized understanding.      RICE     Rest an injury, elevate it, and use ice and compression as directed.   RICE stands for rest, ice, compression, and elevation. These can limit pain and swelling after an injury. RICE may be recommended to help treat fractures, sprains, strains, and bruises or bumps.   Home care  The following explain the details of RICE:  Rest. Limit the use of the injured body part. This helps prevent further damage to the body part and gives it time to heal. In some cases, you may need a sling, brace, splint, or cast to help keep the body part still until it has healed.  Ice. Applying ice right after an injury helps relieve pain and swelling. Wrap a bag of ice in a thin towel. Then, place it over the injured area. Do this for 10 to 15 minutes every 3 to 4 hours. Continue for the next 1 to 3 days or until your symptoms improve. Never put ice directly on your skin or ice an area longer than 15 minutes at a  time.  Compression. Putting pressure on an injury helps reduce swelling and provides support. Wrap the injured area firmly with an elastic bandage/wrap. Make sure not to wrap the bandage too tightly or you will cut off blood flow to the injured area. If your bandage loosens, rewrap it.  Elevation. Keeping an injury raised above the level of your heart reduces swelling, pain, and throbbing. For instance, if you have a broken leg, it may help to rest your leg on several pillows when sitting or lying down. Try to keep the injured area elevated for at least 2 to 3 hours per day.  Follow-up care  Follow up with your healthcare provider, or as advised.  When to seek medical advice  Call your healthcare provider right away if any of these occur:  Fever of 100.4°F (38°C) or higher, or as directed by your healthcare provider  Increased pain or swelling in the injured body part  Injured body part becomes cold, blue, numb, or tingly  Signs of infection. These include warmth in the skin, redness, drainage, or bad smell coming from the injured body part.  Date Last Reviewed: 1/18/2016  © 7148-9404 [employer]. [address] [place] [address], [place] [address]. All rights reserved. This information is not intended as a substitute for professional medical care. Always follow your healthcare professional's instructions.

## 2023-07-16 NOTE — PROGRESS NOTES
"Subjective:      Patient ID: Kathrine Ashraf is a 53 y.o. female.    Vitals:  height is 5' 2" (1.575 m) and weight is 106.1 kg (234 lb). Her oral temperature is 97.7 °F (36.5 °C). Her blood pressure is 134/78 and her pulse is 65. Her respiration is 16 and oxygen saturation is 100%.   Body mass index is 42.8 kg/m².    Chief Complaint: Abdominal Pain (Right lower quadrant abdominal pain )      53-year-old female with a history of morbid obesity BMI 42.8, obstructive sleep apnea, anxiety/depression, seasonal allergies, status post hysterectomy, and right hip arthritis who presents to urgent care clinic for evaluation.  She is here multiple complaints.  Has right hip pain into her groin that has been ongoing for 1 month.  This is treated by her PCP with outpatient physical therapy, gabapentin, lidocaine, and Robaxin.  Pain only occurs when she rotates her hip.  Has not seen orthopedic specialist yet.  Requesting referral.    Last 3 days she also has new onset of intermittent nausea not associated with eating.  Also has generalized fatigue and headache.  Normal bowel movements.  Denies any sick exposure or food exposure.  No heartburn, chest pain, cough, abdominal pain, urinary symptoms, flank pain, or focal weakness/deficits.  No sick contacts.  Declines COVID testing.    HPI    Constitution: Positive for fatigue. Negative for activity change, appetite change, chills, sweating, fever and generalized weakness.   HENT:  Negative for ear pain, hearing loss, facial swelling, congestion, postnasal drip, sinus pain, sinus pressure, sore throat, trouble swallowing and voice change.    Neck: Negative for neck pain, neck stiffness and painful lymph nodes.   Cardiovascular:  Negative for chest pain, leg swelling, palpitations, sob on exertion and passing out.   Eyes:  Negative for eye discharge, eye pain, photophobia, vision loss, double vision and blurred vision.   Respiratory:  Negative for chest tightness, cough, sputum " production, bloody sputum, COPD, shortness of breath, stridor, wheezing and asthma.    Gastrointestinal:  Positive for nausea. Negative for abdominal pain, vomiting, constipation, diarrhea, bright red blood in stool, rectal bleeding, heartburn and bowel incontinence.   Genitourinary:  Negative for dysuria, frequency, urgency, urine decreased, flank pain, bladder incontinence and hematuria.   Musculoskeletal:  Positive for joint pain and arthritis. Negative for trauma, joint swelling, abnormal ROM of joint, muscle cramps and muscle ache.   Skin:  Negative for color change, pale, rash and wound.   Allergic/Immunologic: Negative for seasonal allergies, asthma and immunocompromised state.   Neurological:  Positive for headaches. Negative for dizziness, history of vertigo, light-headedness, passing out, facial drooping, speech difficulty, coordination disturbances, loss of balance, disorientation, altered mental status, loss of consciousness, numbness, tingling and seizures.   Hematologic/Lymphatic: Negative for swollen lymph nodes, easy bruising/bleeding and trouble clotting. Does not bruise/bleed easily.   Psychiatric/Behavioral:  Negative for altered mental status and disorientation.     Objective:     Physical Exam   Constitutional: She is oriented to person, place, and time. She appears well-developed. She is cooperative. She does not appear ill. No distress.      Comments:Well-appearing   obesity  HENT:   Head: Normocephalic.   Ears:   Right Ear: Hearing, external ear and ear canal normal. No no drainage, swelling or tenderness. No mastoid tenderness.   Left Ear: Hearing, external ear and ear canal normal. No no drainage, swelling or tenderness. No mastoid tenderness.   Nose: Nose normal. No rhinorrhea or congestion. Right sinus exhibits no maxillary sinus tenderness and no frontal sinus tenderness. Left sinus exhibits no maxillary sinus tenderness and no frontal sinus tenderness.   Mouth/Throat: Uvula is midline,  oropharynx is clear and moist and mucous membranes are normal. Mucous membranes are moist. No oral lesions. No trismus in the jaw. No uvula swelling. No oropharyngeal exudate, posterior oropharyngeal edema or posterior oropharyngeal erythema. No tonsillar exudate. Oropharynx is clear.   Eyes: Conjunctivae, EOM and lids are normal. Right eye exhibits no discharge. Left eye exhibits no discharge. Right conjunctiva is not injected. Right conjunctiva has no hemorrhage. Left conjunctiva is not injected. Left conjunctiva has no hemorrhage. Extraocular movement intact vision grossly intact gaze aligned appropriately   Neck: Phonation normal. Neck supple. No neck rigidity present.   Cardiovascular: Normal rate, regular rhythm, normal heart sounds and normal pulses.   No murmur heard.  Pulmonary/Chest: Effort normal and breath sounds normal. No accessory muscle usage. No respiratory distress. She has no wheezes. She exhibits no tenderness.   Abdominal: Normal appearance. She exhibits no distension. Soft. There is no abdominal tenderness. There is no rebound, no guarding, no left CVA tenderness and no right CVA tenderness.   Musculoskeletal: Normal range of motion.         General: No tenderness. Normal range of motion.      Right lower leg: No edema.      Left lower leg: No edema.      Comments: Spinal exam:   Moves all extremities with good strength 5/5  BUE- deltoid, triceps, biceps, wrist ext/flexion, hand , and interossei  BLE- hip flexion, knee ext/flexion, dorsiflexion, plantar flexion, EHL, ankle inversion, and ankle eversion  Standing and sitting posture normal.  Gait normal.      Negative straight leg raise   Sensation intact to light touch throughout.  2+ DTR bilaterally.    There is no tenderness to palpation of midline spine.  There is no bony step-off, crepitus, or bony tenderness to palpation.      No tenderness to palpation of bilateral SI joint   Right hip pain on hip ROM and internal/external rotation.  No TTP trochanteric bursa.  Leonel's test positive on the right   Lymphadenopathy:     She has no cervical adenopathy.   Neurological: no focal deficit. She is alert, oriented to person, place, and time and at baseline. She has normal motor skills and normal sensation. She displays facial symmetry and no dysarthria. She exhibits normal muscle tone. Gait and coordination normal. Coordination normal. GCS eye subscore is 4. GCS verbal subscore is 5. GCS motor subscore is 6.   Skin: Skin is warm, dry and no rash. Capillary refill takes less than 2 seconds.   Psychiatric: She experiences Normal attention. Her speech is normal and behavior is normal. Mood and thought content normal.   Nursing note and vitals reviewed.    Results for orders placed or performed in visit on 07/16/23   POCT Urinalysis, Dipstick, Automated, W/O Scope   Result Value Ref Range    POC Blood, Urine Negative Negative    POC Bilirubin, Urine Negative Negative    POC Urobilinogen, Urine NORM 0.1 - 1.1    POC Ketones, Urine Negative Negative    POC Protein, Urine Negative Negative    POC Nitrates, Urine Negative Negative    POC Glucose, Urine Negative Negative    pH, UA 6.5 5 - 8    POC Specific Gravity, Urine 1.020 1.003 - 1.029    POC Leukocytes, Urine Negative Negative     X-Ray Abdomen Flat And Erect    Result Date: 7/16/2023  EXAMINATION: XR ABDOMEN FLAT AND ERECT CLINICAL HISTORY: Unspecified abdominal pain TECHNIQUE: Flat and erect AP views of the abdomen were performed. COMPARISON: Right hip series 06/05/2023, abdominal ultrasound 10/20/2021, MRI pelvis 04/03/2012, pelvic ultrasound 07/03/2006 FINDINGS: Nonobstructive bowel gas pattern.  No organomegaly or significant mass effect.  Stable subcentimeter dystrophic calcification at the inferior right hepatic lobe.  No large amount of free air.  Pelvic phleboliths noted.  Imaged lung bases are clear.  No acute osseous process seen.     Nonobstructive bowel gas pattern. Electronically signed  by: Rene Perez MD Date:    07/16/2023 Time:    15:48     Assessment:     1. Right groin pain    2. Arthritis of right hip    3. Acute viral syndrome    4. Fatigue, unspecified type    5. Nausea without vomiting    6. Morbid obesity      Note dictated with voice recognition software, please excuse any grammatical errors.    On exam, patient is nontoxic appearing and vitals are stable.  Patient is essentially neurovascularly intact on exam.  Symptoms consistent with above.  Clinic testing ordered:    abd Xrays with normal bowel gas pattern.  Urinalysis negative.  Diagnostic testing results were independently reviewed and interpreted, which were discussed in depth with patient.  X-ray right hip 06/05/2023 reviewed which showed no acute fracture dislocation.  Mild acetabular osteophytosis.  Bilateral foot x-rays 06/01/2023 with no acute fracture or dislocation.  Mild hypertrophic change in left posterior calcaneus.      Right hip arthritis/pain,  Patient was prescribed brace/splint and recommended OTC treatments for their symptoms. Patient was treated conservatively with activity modification, OTC pain reliever, muscle stretches, and Warm vs. RICE therapy.   Patient was referred to orthopedics for evaluation or if they fail conservative treatment.     Acute viral syndrome/gastritis,   Prescribed Zofran as needed for nausea.  Recommended increased fluid and fiber intake.  Discuss brat diet.  If symptoms do not improve/worsens, patient was referred back to PCP for continued outpatient workup and management.     -We also discussed diet, exercise, and weight loss given obesity.  She has already been evaluated with registered dietitian.      Patient was instructed to return for re-evaluation for any worsening or change in current symptoms. Strict ED versus clinic precautions given in depth. Discharge and follow-up instructions given verbally/printed with the patient who expressed understanding and willingness to comply  with my recommendations.  Patient verbalized understanding and agreed with the entirety of plan of care.    Plan:       Right groin pain  -     POCT Urinalysis, Dipstick, Automated, W/O Scope  -     X-Ray Abdomen Flat And Erect; Future; Expected date: 07/16/2023  -     Ambulatory referral/consult to Orthopedics    Arthritis of right hip  -     Ambulatory referral/consult to Orthopedics    Acute viral syndrome    Fatigue, unspecified type    Nausea without vomiting  -     ondansetron (ZOFRAN-ODT) 4 MG TbDL; Take 1 tablet (4 mg total) by mouth every 12 (twelve) hours as needed (severe nausea and vomiting).  Dispense: 8 tablet; Refill: 0    Morbid obesity             Additional MDM:     Heart Failure Score:   COPD = No    Patient Instructions   PLEASE READ YOUR DISCHARGE INSTRUCTIONS ENTIRELY AS IT CONTAINS IMPORTANT INFORMATION.  - take Zofran as needed for nausea  - OTC Tylenol/anti-inflammatory as needed for pain (see below). These medications can be obtained over the counter at any local pharmacy without requiring a prescription.   OTC ORAL medications for pain reliever or fever reducing:  - Acetaminophen (tylenol 650mg every 8 hour as needed with food) or Ibuprofen (advil,motrin 400-600mg every 8 hour with food as needed) as directed as needed for fever/pain. Avoid tylenol if you have a history of liver disease or allergic reactions. Do not take ibuprofen if you have a history of allergic reactions, stomach bleeding ulcers, kidney disease, or if you take blood thinners.  -The patient was advised that NSAID-type medications have two very important potential side effects: gastrointestinal irritation including hemorrhage and renal injuries. patient was asked to take the medication with food and to stop if patient experiences any GI upset. I asked patient to call for vomiting, abdominal pain or black/bloody stools. The patient expresses understanding of these issues and all questions were answered.  OTC TOPICAL meds  for pain reliever :  -You can apply OTC diclofenac or Volatren Gel 2-3 times daily to muscle or joints.  -You can also apply OTC lidocaine 4-5% with OR without menthol ointment 2-3 daily to muscle or joints.  -Please let one absorb before using the other. Do not use both at the same time as it may decrease efficacy. Please stop using if you develop any skin rash or irritation.  -Please wash your hands after application of these topical products.   - continue heat (muscle) /ice (bone/joint) compression, rice therapy, and muscle stretches.   - Radiographs and all diagnostic testing reviewed with patient  - if no improvement or worsening symptoms, recommend follow-up with *orthopedics or PCP for further evaluation.  Please call the number below to set up appointment; if a referral has been placed.  - Follow up with your PCP or specialty clinic as directed.  You can call [phone] or [phone] to schedule an appointment with the appropriate provider.      - discussed weight loss given obesity  -You must understand that you've received an Urgent Care treatment only and that you may be released before all your medical problems are known or treated. You, the patient, will arrange for follow up care as instructed. Please arrange follow up with your primary medical clinic within 2-5 days if your signs and symptoms have not resolved or worsen.   - If your condition worsens or fails to improve we recommend that you receive another evaluation at the emergency room immediately or contact your primary medical clinic to discuss your concerns in next 2-5 days.  Strict ER versus clinic precautions given.      RED FLAGS/WARNING SYMPTOMS DISCUSSED WITH PATIENT THAT WOULD WARRANT EMERGENT MEDICAL ATTENTION. Patient aware and verbalized understanding.      RICE     Rest an injury, elevate it, and use ice and compression as directed.   RICE stands for rest, ice, compression, and elevation. These can limit pain and swelling after  an injury. RICE may be recommended to help treat fractures, sprains, strains, and bruises or bumps.   Home care  The following explain the details of RICE:  Rest. Limit the use of the injured body part. This helps prevent further damage to the body part and gives it time to heal. In some cases, you may need a sling, brace, splint, or cast to help keep the body part still until it has healed.  Ice. Applying ice right after an injury helps relieve pain and swelling. Wrap a bag of ice in a thin towel. Then, place it over the injured area. Do this for 10 to 15 minutes every 3 to 4 hours. Continue for the next 1 to 3 days or until your symptoms improve. Never put ice directly on your skin or ice an area longer than 15 minutes at a time.  Compression. Putting pressure on an injury helps reduce swelling and provides support. Wrap the injured area firmly with an elastic bandage/wrap. Make sure not to wrap the bandage too tightly or you will cut off blood flow to the injured area. If your bandage loosens, rewrap it.  Elevation. Keeping an injury raised above the level of your heart reduces swelling, pain, and throbbing. For instance, if you have a broken leg, it may help to rest your leg on several pillows when sitting or lying down. Try to keep the injured area elevated for at least 2 to 3 hours per day.  Follow-up care  Follow up with your healthcare provider, or as advised.  When to seek medical advice  Call your healthcare provider right away if any of these occur:  Fever of 100.4°F (38°C) or higher, or as directed by your healthcare provider  Increased pain or swelling in the injured body part  Injured body part becomes cold, blue, numb, or tingly  Signs of infection. These include warmth in the skin, redness, drainage, or bad smell coming from the injured body part.  Date Last Reviewed: 1/18/2016  © 8069-1506 [employer]. [address] [place] [address], [place] [address]. All rights reserved. This information is  not intended as a substitute for professional medical care. Always follow your healthcare professional's instructions.

## 2023-07-18 ENCOUNTER — CLINICAL SUPPORT (OUTPATIENT)
Dept: REHABILITATION | Facility: OTHER | Age: 53
End: 2023-07-18
Payer: COMMERCIAL

## 2023-07-18 DIAGNOSIS — R26.81 GAIT INSTABILITY: ICD-10-CM

## 2023-07-18 DIAGNOSIS — R29.898 WEAKNESS OF BOTH LOWER EXTREMITIES: Primary | ICD-10-CM

## 2023-07-18 PROCEDURE — 97110 THERAPEUTIC EXERCISES: CPT | Mod: PN,CQ

## 2023-07-18 PROCEDURE — 97112 NEUROMUSCULAR REEDUCATION: CPT | Mod: PN,CQ

## 2023-07-18 NOTE — PROGRESS NOTES
"[facility]   Physical Therapy Treatment Note     Name: Kathrine Ashraf  Clinic Number: [id number]    Therapy Diagnosis:   Encounter Diagnoses   Name Primary?    Weakness of both lower extremities Yes    Gait instability        Physician: Monse Daniels DPM    Visit Date: 7/18/2023    Physician Orders: Physical Therapy Evaluate and Treat  Medical Diagnosis from Referral: gait disturbance  Evaluation Date: 7/3/23  Authorization Period Expiration: 5/31/24  Plan of Care Expiration: 7/3/2023 to 10/3/23  Visit # / Visits authorized: 3/20       Precautions: Standard    Time In: 1600  Time Out: 1700  Total Billable Time: 60 minutes      SUBJECTIVE     Pt reports: she has been performing her HEP as directed. State she feels a little better after performing the ones on her stomach.   She was compliant with home exercise program.  Response to previous treatment: felt fine, no issues   Functional change: n/a    Pain: 3/10  Location: low back     OBJECTIVE     Objective Measures updated at progress report unless specified.       TREATMENT     Kathrine received the treatments listed below:        therapeutic activities to improve functional performance for 00 minutes, including:      received therapeutic exercises to develop strength and ROM for 30 minutes including:    +Bridging, 3" hold x 20  +PPT, 3" hold x 20  +LTR on red ball, 2 min  JAYCOB x 4 minutes   +Seated silver SB rollouts, 10x10"  Prone hip extension x15 B   Standing extensions to tolerance 10x10"      neuromuscular re-education activities to improve: Coordination, Sense, and Proprioception for 30 minutes. The following activities were included:  TA activation x10-heavy cues for correct technique   Marching with TA activation 2x10  SLR w/ TrA activation x10 B  SL hip abduction x15  SL clamshell x15  Standing rows + TrA RTB, 3x10  +Seated sciatic nerve glides, 20x R/L    received the following manual therapy techniques: Soft tissue Mobilization " were applied to the: 0 minutes, including:      PATIENT EDUCATION AND HOME EXERCISES     Home Exercises Provided and Patient Education Provided     Education provided:   - back extension/prone press ups    Written Home Exercises Provided: Patient instructed to cont prior HEP. Exercises were reviewed and Kathrine was able to demonstrate them prior to the end of the session.  Kathrine demonstrated good  understanding of the education provided. See EMR under Patient Instructions for exercises provided during therapy sessions    ASSESSMENT   Pt tolerated exercise well with minimal complaints of increased pain. Pt was able to complete trunk/back rotational exercises with decreased muscle guarding. Muscle weakness in paraspinals/core musculature were notable during exercises. Pt was able to demonstrate improved core/transverse abdominis activation after cuing. Pt presents with extension bias for pain/symptom relief.         Kathrine Is progressing well towards her goals.   Pt prognosis is Good.     Pt will continue to benefit from skilled outpatient physical therapy to address the deficits listed in the problem list box on initial evaluation, provide pt/family education and to maximize pt's level of independence in the home and community environment.     Pt's spiritual, cultural and educational needs considered and pt agreeable to plan of care and goals.     Anticipated barriers to physical therapy: None      GOALS:  Short Term Goals (4 Weeks):  1. Patient will be compliant with home exercise program to supplement therapy in promoting functional mobility. (progressing, not met)    2. Patient will perform transfers/walking with good control to demonstrate improved core strength. (progressing, not met)    3. Patient will report no pain during thoracolumbar active range of motion to promote functional mobility.  (progressing, not met)    4. Patient will improve impaired lower extremity manual muscle tests  to >/=4/5 to improve strength  for functional tasks. (progressing, not met)          Long Term Goals (6 Weeks):   1. Patient will improve FOTO score to </= 57% limited to decrease perceived limitation with maintaining/changing body position. (progressing, not met)    2. Patient will perform walking with good control to demonstrate improved core strength.  (progressing, not met)    3. Patient will improve impaired lower extremity manual muscle tests to >/=4+/5 to improve strength for functional tasks.  (progressing, not met)    4. Patient will tolerate standing for 60 minutes with no increase in low back pain to return to PLOF.  (progressing, not met)      PLAN   Plan of care Certification: 7/3/2023 to 10/3/23    Focus on LE/core strength and endurance with emphasis on ADL performance.     Bill Montes De Oca, PTA

## 2023-07-20 ENCOUNTER — DOCUMENTATION ONLY (OUTPATIENT)
Dept: REHABILITATION | Facility: OTHER | Age: 53
End: 2023-07-20
Payer: COMMERCIAL

## 2023-07-20 ENCOUNTER — CLINICAL SUPPORT (OUTPATIENT)
Dept: REHABILITATION | Facility: OTHER | Age: 53
End: 2023-07-20
Payer: COMMERCIAL

## 2023-07-20 DIAGNOSIS — R26.81 GAIT INSTABILITY: ICD-10-CM

## 2023-07-20 DIAGNOSIS — R29.898 WEAKNESS OF BOTH LOWER EXTREMITIES: Primary | ICD-10-CM

## 2023-07-20 PROCEDURE — 97112 NEUROMUSCULAR REEDUCATION: CPT | Mod: PN,CQ

## 2023-07-20 PROCEDURE — 97110 THERAPEUTIC EXERCISES: CPT | Mod: PN,CQ

## 2023-07-20 NOTE — PROGRESS NOTES
"[facility]   Physical Therapy Treatment Note     Name: Kathrine Ashraf  Clinic Number: [id number]    Therapy Diagnosis:   Encounter Diagnoses   Name Primary?    Weakness of both lower extremities Yes    Gait instability          Physician: Monse Daniels DPM    Visit Date: 7/20/2023    Physician Orders: Physical Therapy Evaluate and Treat  Medical Diagnosis from Referral: gait disturbance  Evaluation Date: 7/3/23  Authorization Period Expiration: 5/31/24  Plan of Care Expiration: 7/3/2023 to 10/3/23  Visit # / Visits authorized: 4/20       Precautions: Standard    Time In: 1600  Time Out: 1700  Total Billable Time: 60 minutes      SUBJECTIVE     Pt reports: she felt like the LTR exercise flared up her R hip pain.  She was compliant with home exercise program.  Response to previous treatment: felt fine, no issues   Functional change: n/a    Pain: 3/10  Location: low back     OBJECTIVE     Objective Measures updated at progress report unless specified.       TREATMENT     Kathrine received the treatments listed below:        therapeutic activities to improve functional performance for 00 minutes, including:      received therapeutic exercises to develop strength and ROM for 30 minutes including:    Bridging, 3" hold x 20  PPT, 3" hold x 20  +LTR on red ball, 2 min - caused hip pain  JAYCOB x 4 minutes   Seated silver SB rollouts, 10x10"  Prone hip extension 2x10 B   Standing extensions to tolerance 10x10"      neuromuscular re-education activities to improve: Coordination, Sense, and Proprioception for 30 minutes. The following activities were included:  TA activation x10-heavy cues for correct technique   Marching with TA activation 2x10  SLR w/ TrA activation x10 B  SL hip abduction x15  SL clamshell x15  Standing rows + TrA RTB, 3x10  Seated sciatic nerve glides, 20x R/L    received the following manual therapy techniques: Soft tissue Mobilization were applied to the: 0 minutes, " including:      PATIENT EDUCATION AND HOME EXERCISES     Home Exercises Provided and Patient Education Provided     Education provided:   - back extension/prone press ups    Written Home Exercises Provided: Patient instructed to cont prior HEP. Exercises were reviewed and Kathrine was able to demonstrate them prior to the end of the session.  Kathrine demonstrated good  understanding of the education provided. See EMR under Patient Instructions for exercises provided during therapy sessions    ASSESSMENT   Kathrine continues to present with muscle weakness in glutes and paraspinals however she had increased pain with LTR on SB. Pain was localized to the anterior aspect of the hip. As a result, exercise was abandoned. She reported decreased symptoms/pain with prone/back extension exercises. Continued to focus on core/stabilization strengthening with emphasis on posture.         Kathrine Is progressing well towards her goals.   Pt prognosis is Good.     Pt will continue to benefit from skilled outpatient physical therapy to address the deficits listed in the problem list box on initial evaluation, provide pt/family education and to maximize pt's level of independence in the home and community environment.     Pt's spiritual, cultural and educational needs considered and pt agreeable to plan of care and goals.     Anticipated barriers to physical therapy: None      GOALS:  Short Term Goals (4 Weeks):  1. Patient will be compliant with home exercise program to supplement therapy in promoting functional mobility. (progressing, not met)    2. Patient will perform transfers/walking with good control to demonstrate improved core strength. (progressing, not met)    3. Patient will report no pain during thoracolumbar active range of motion to promote functional mobility.  (progressing, not met)    4. Patient will improve impaired lower extremity manual muscle tests  to >/=4/5 to improve strength for functional tasks. (progressing, not met)           Long Term Goals (6 Weeks):   1. Patient will improve FOTO score to </= 57% limited to decrease perceived limitation with maintaining/changing body position. (progressing, not met)    2. Patient will perform walking with good control to demonstrate improved core strength.  (progressing, not met)    3. Patient will improve impaired lower extremity manual muscle tests to >/=4+/5 to improve strength for functional tasks.  (progressing, not met)    4. Patient will tolerate standing for 60 minutes with no increase in low back pain to return to PLOF.  (progressing, not met)      PLAN   Plan of care Certification: 7/3/2023 to 10/3/23    Focus on LE/core strength and endurance with emphasis on ADL performance.     Bill Montes De Oca, PTA

## 2023-07-20 NOTE — PROGRESS NOTES
Physical Therapist and Physical Therapist Assistant met face to face to discuss patient's treatment plan and progress towards established goals. Pt will be seen by a physical therapist minimally every 6th visit or every 30 days.    Bill Montes De Oca, PTA  07/20/2023

## 2023-07-21 ENCOUNTER — TELEPHONE (OUTPATIENT)
Dept: SPORTS MEDICINE | Facility: CLINIC | Age: 53
End: 2023-07-21
Payer: COMMERCIAL

## 2023-07-21 NOTE — TELEPHONE ENCOUNTER
called and spoke to patient re appt today. She states she just saw someone last month and someone called her and scheduled her for another appt. She says she does not wish to come in today and will schedule to see original provider, Jodi next month.

## 2023-07-24 ENCOUNTER — OUTSIDE PLACE OF SERVICE (OUTPATIENT)
Dept: URGENT CARE | Facility: CLINIC | Age: 53
End: 2023-07-24
Payer: COMMERCIAL

## 2023-07-24 DIAGNOSIS — N76.0 ACUTE VAGINITIS: Primary | ICD-10-CM

## 2023-07-24 PROCEDURE — 99212 PR OFFICE/OUTPT VISIT, EST, LEVL II, 10-19 MIN: ICD-10-PCS | Mod: 95,,,

## 2023-07-24 PROCEDURE — 99212 OFFICE O/P EST SF 10 MIN: CPT | Mod: 95,,,

## 2023-08-01 ENCOUNTER — CLINICAL SUPPORT (OUTPATIENT)
Dept: REHABILITATION | Facility: OTHER | Age: 53
End: 2023-08-01
Payer: COMMERCIAL

## 2023-08-01 DIAGNOSIS — R26.81 GAIT INSTABILITY: ICD-10-CM

## 2023-08-01 DIAGNOSIS — R29.898 WEAKNESS OF BOTH LOWER EXTREMITIES: Primary | ICD-10-CM

## 2023-08-01 PROCEDURE — 97110 THERAPEUTIC EXERCISES: CPT | Mod: PN,CQ

## 2023-08-01 PROCEDURE — 97112 NEUROMUSCULAR REEDUCATION: CPT | Mod: PN,CQ

## 2023-08-01 NOTE — PROGRESS NOTES
"[facility]   Physical Therapy Treatment Note     Name: Kathrine Ashraf  Clinic Number: [id number]    Therapy Diagnosis:   Encounter Diagnoses   Name Primary?    Weakness of both lower extremities Yes    Gait instability            Physician: Monse Daniels DPM    Visit Date: 8/1/2023    Physician Orders: Physical Therapy Evaluate and Treat  Medical Diagnosis from Referral: gait disturbance  Evaluation Date: 7/3/23  Authorization Period Expiration: 5/31/24  Plan of Care Expiration: 7/3/2023 to 10/3/23  Visit # / Visits authorized: 5/20       Precautions: Standard    Time In: 1553  Time Out: 1750  Total Billable Time: 53 minutes      SUBJECTIVE     Pt reports: Less back pain however leg pain continues to persist.  She feels like PT is helping.   She was compliant with home exercise program.  Response to previous treatment: felt fine, no issues   Functional change: less back pain    Pain: 3/10  Location: low back     OBJECTIVE     Objective Measures updated at progress report unless specified.       TREATMENT     Kathrine received the treatments listed below:        therapeutic activities to improve functional performance for 00 minutes, including:      received therapeutic exercises to develop strength and ROM for 23 minutes including:    Bridging, 3" hold x 30  PPT, 3" hold x 20  LTR on red ball, 2 min - caused hip pain  +Prone press ups 10x 10" hold  JAYCOB x 4 minutes   Seated silver SB rollouts, 10x10"  Prone hip extension 25x B   Standing extensions to tolerance 10x10"      neuromuscular re-education activities to improve: Coordination, Sense, and Proprioception for 30 minutes. The following activities were included:  TA activation x10-heavy cues for correct technique   Marching with TA activation 2x10  SLR w/ pilates ring press down, 10x R/L  SL hip abduction x20  SL clamshell x20  Standing rows + TrA GTB, 3x10  Seated sciatic nerve glides, 20x R/L  +Dead lifts, 3# wand, 20x    received the " following manual therapy techniques: Soft tissue Mobilization were applied to the: 0 minutes, including:      PATIENT EDUCATION AND HOME EXERCISES     Home Exercises Provided and Patient Education Provided     Education provided:   - back extension/prone press ups    Written Home Exercises Provided: Patient instructed to cont prior HEP. Exercises were reviewed and Kathrine was able to demonstrate them prior to the end of the session.  Kathrine demonstrated good  understanding of the education provided. See EMR under Patient Instructions for exercises provided during therapy sessions    ASSESSMENT   Kathrine tolerated exercise with improved endurance today. She was able to complete increased reps of exercises with improved TrA activation. Added dead lifts with emphasis on mechanics/posture for improved ADL performance.         Kathrine Is progressing well towards her goals.   Pt prognosis is Good.     Pt will continue to benefit from skilled outpatient physical therapy to address the deficits listed in the problem list box on initial evaluation, provide pt/family education and to maximize pt's level of independence in the home and community environment.     Pt's spiritual, cultural and educational needs considered and pt agreeable to plan of care and goals.     Anticipated barriers to physical therapy: None      GOALS:  Short Term Goals (4 Weeks):  1. Patient will be compliant with home exercise program to supplement therapy in promoting functional mobility. (progressing, not met)    2. Patient will perform transfers/walking with good control to demonstrate improved core strength. (progressing, not met)    3. Patient will report no pain during thoracolumbar active range of motion to promote functional mobility.  (progressing, not met)    4. Patient will improve impaired lower extremity manual muscle tests  to >/=4/5 to improve strength for functional tasks. (progressing, not met)          Long Term Goals (6 Weeks):   1. Patient will  improve FOTO score to </= 57% limited to decrease perceived limitation with maintaining/changing body position. (progressing, not met)    2. Patient will perform walking with good control to demonstrate improved core strength.  (progressing, not met)    3. Patient will improve impaired lower extremity manual muscle tests to >/=4+/5 to improve strength for functional tasks.  (progressing, not met)    4. Patient will tolerate standing for 60 minutes with no increase in low back pain to return to PLOF.  (progressing, not met)      PLAN   Plan of care Certification: 7/3/2023 to 10/3/23    Focus on LE/core strength and endurance with emphasis on ADL performance.     Bill Montes De Oca, PTA

## 2023-08-03 ENCOUNTER — CLINICAL SUPPORT (OUTPATIENT)
Dept: REHABILITATION | Facility: OTHER | Age: 53
End: 2023-08-03
Payer: COMMERCIAL

## 2023-08-03 DIAGNOSIS — R29.898 WEAKNESS OF BOTH LOWER EXTREMITIES: Primary | ICD-10-CM

## 2023-08-03 DIAGNOSIS — R26.81 GAIT INSTABILITY: ICD-10-CM

## 2023-08-03 PROCEDURE — 97112 NEUROMUSCULAR REEDUCATION: CPT | Mod: PN,CQ

## 2023-08-03 PROCEDURE — 97110 THERAPEUTIC EXERCISES: CPT | Mod: PN,CQ

## 2023-08-03 NOTE — PROGRESS NOTES
"[facility]   Physical Therapy Treatment Note     Name: Kathrine Ashraf  Clinic Number: [id number]    Therapy Diagnosis:   Encounter Diagnoses   Name Primary?    Weakness of both lower extremities Yes    Gait instability          Physician: Monse Daniels DPM    Visit Date: 8/3/2023    Physician Orders: Physical Therapy Evaluate and Treat  Medical Diagnosis from Referral: gait disturbance  Evaluation Date: 7/3/23  Authorization Period Expiration: 5/31/24  Plan of Care Expiration: 7/3/2023 to 10/3/23  Visit # / Visits authorized: 6/20       Precautions: Standard    Time In: 1650  Time Out: 1745  Total Billable Time: 53 minutes      SUBJECTIVE     Pt reports: her back has been feeling better. States she feels like the extension exercises seem to help. States she has to leave 15 minutes early today.   She was compliant with home exercise program.  Response to previous treatment: felt fine, no issues   Functional change: less back pain    Pain: 2/10  Location: low back     OBJECTIVE     Objective Measures updated at progress report unless specified.       TREATMENT     Kathrine received the treatments listed below:        therapeutic activities to improve functional performance for 00 minutes, including:      received therapeutic exercises to develop strength and ROM for 23 minutes including:    Bridging, 3" hold x 30  PPT, 3" hold x 20  LTR on red ball, 2 min - caused hip pain  Prone press ups 10x 10" hold  JAYCOB x 4 minutes   Seated silver SB rollouts, 10x10"  Prone hip extension 25x B   Standing extensions to tolerance 10x10"      neuromuscular re-education activities to improve: Coordination, Sense, and Proprioception for 30 minutes. The following activities were included:  TA activation x10-heavy cues for correct technique   Marching with TA activation 2x10  SLR w/ pilates ring press down, 10x R/L  SL hip abduction x20  SL clamshell x20  Standing rows + TrA GTB, 3x10  +Pallof press 10x " GTB  Seated sciatic nerve glides, 20x R/L  Dead lifts, 3# wand, 20x  +Sit to stand with pilates ring/TrA squeeze, 20x    received the following manual therapy techniques: Soft tissue Mobilization were applied to the: 0 minutes, including:      PATIENT EDUCATION AND HOME EXERCISES     Home Exercises Provided and Patient Education Provided     Education provided:   - back extension/prone press ups    Written Home Exercises Provided: Patient instructed to cont prior HEP. Exercises were reviewed and Kathrine was able to demonstrate them prior to the end of the session.  Kathrine demonstrated good  understanding of the education provided. See EMR under Patient Instructions for exercises provided during therapy sessions    ASSESSMENT   Kathrine tolerated exercise with improved endurance today. She was able to demonstrate improved TrA activation as well as improved ROM with trunk/back extension during bridging. She is responding well with extension based exercises.         Kathrine Is progressing well towards her goals.   Pt prognosis is Good.     Pt will continue to benefit from skilled outpatient physical therapy to address the deficits listed in the problem list box on initial evaluation, provide pt/family education and to maximize pt's level of independence in the home and community environment.     Pt's spiritual, cultural and educational needs considered and pt agreeable to plan of care and goals.     Anticipated barriers to physical therapy: None      GOALS:  Short Term Goals (4 Weeks):  1. Patient will be compliant with home exercise program to supplement therapy in promoting functional mobility. (progressing, not met)    2. Patient will perform transfers/walking with good control to demonstrate improved core strength. (progressing, not met)    3. Patient will report no pain during thoracolumbar active range of motion to promote functional mobility.  (progressing, not met)    4. Patient will improve impaired lower extremity manual  muscle tests  to >/=4/5 to improve strength for functional tasks. (progressing, not met)          Long Term Goals (6 Weeks):   1. Patient will improve FOTO score to </= 57% limited to decrease perceived limitation with maintaining/changing body position. (progressing, not met)    2. Patient will perform walking with good control to demonstrate improved core strength.  (progressing, not met)    3. Patient will improve impaired lower extremity manual muscle tests to >/=4+/5 to improve strength for functional tasks.  (progressing, not met)    4. Patient will tolerate standing for 60 minutes with no increase in low back pain to return to PLOF.  (progressing, not met)      PLAN   Plan of care Certification: 7/3/2023 to 10/3/23    Focus on LE/core strength and endurance with emphasis on ADL performance.     Bill Montes De Oca, PTA

## 2023-08-09 ENCOUNTER — CLINICAL SUPPORT (OUTPATIENT)
Dept: REHABILITATION | Facility: OTHER | Age: 53
End: 2023-08-09
Payer: COMMERCIAL

## 2023-08-09 DIAGNOSIS — R26.81 GAIT INSTABILITY: ICD-10-CM

## 2023-08-09 DIAGNOSIS — R29.898 WEAKNESS OF BOTH LOWER EXTREMITIES: Primary | ICD-10-CM

## 2023-08-09 PROCEDURE — 97112 NEUROMUSCULAR REEDUCATION: CPT | Mod: PN,CQ

## 2023-08-09 PROCEDURE — 97110 THERAPEUTIC EXERCISES: CPT | Mod: PN,CQ

## 2023-08-09 NOTE — PROGRESS NOTES
"[facility]   Physical Therapy Treatment Note     Name: Kathrine Ashraf  Clinic Number: [id number]    Therapy Diagnosis:   Encounter Diagnoses   Name Primary?    Weakness of both lower extremities Yes    Gait instability            Physician: Monse Daniels DPM    Visit Date: 8/9/2023    Physician Orders: Physical Therapy Evaluate and Treat  Medical Diagnosis from Referral: gait disturbance  Evaluation Date: 7/3/23  Authorization Period Expiration: 5/31/24  Plan of Care Expiration: 7/3/2023 to 10/3/23  Visit # / Visits authorized: 7/20       Precautions: Standard    Time In: 1628 ( early arrival )  Time Out: 1730  Total Billable Time: 60 minutes      SUBJECTIVE     Pt reports: her back has been feeling better. States she feels like PT is helping as she is not as in as much pain with movement.    She was compliant with home exercise program.  Response to previous treatment: felt fine, no issues   Functional change: less back pain    Pain: 1/10  Location: low back     OBJECTIVE     Objective Measures updated at progress report unless specified.       TREATMENT     Kathrine received the treatments listed below:        therapeutic activities to improve functional performance for 00 minutes, including:      received therapeutic exercises to develop strength and ROM for 25 minutes including:    Bridging, 3" hold x 30  PPT, 3" hold x 20  LTR on red ball, 2 min - caused hip pain  Prone press ups 10x 10" hold  JAYCOB x 4 minutes   Seated silver SB rollouts, 10x10"  Prone hip extension 30x B   Standing extensions to tolerance 10x10"      neuromuscular re-education activities to improve: Coordination, Sense, and Proprioception for 35 minutes. The following activities were included:  TA activation x10-heavy cues for correct technique   Marching with TA activation 2x10  SLR w/ pilates ring press down, 20x R/L  SL hip abduction x20  SL clamshell x20  Standing rows + TrA GTB, 3x10  Pallof press 20x GTB  Seated " "sciatic nerve glides, 20x R/L  Dead lifts to 6" step, 10# KB, 20x  Sit to stand with pilates ring/TrA squeeze, 20x    received the following manual therapy techniques: Soft tissue Mobilization were applied to the: 0 minutes, including:      PATIENT EDUCATION AND HOME EXERCISES     Home Exercises Provided and Patient Education Provided     Education provided:   - Posture and exercise form/rationale     Written Home Exercises Provided: Patient instructed to cont prior HEP. Exercises were reviewed and Kathrine was able to demonstrate them prior to the end of the session.  Kathrine demonstrated good  understanding of the education provided. See EMR under Patient Instructions for exercises provided during therapy sessions    ASSESSMENT   Kathrine tolerated exercise with improved endurance today. She was able to demonstrate improved TrA activation as well as improved ROM with trunk/back extension during bridging. She is responding well with extension based exercises. She was able to perform increased reps today with decreased musculare fatigue.        Kathrine Is progressing well towards her goals.   Pt prognosis is Good.     Pt will continue to benefit from skilled outpatient physical therapy to address the deficits listed in the problem list box on initial evaluation, provide pt/family education and to maximize pt's level of independence in the home and community environment.     Pt's spiritual, cultural and educational needs considered and pt agreeable to plan of care and goals.     Anticipated barriers to physical therapy: None      GOALS:  Short Term Goals (4 Weeks):  1. Patient will be compliant with home exercise program to supplement therapy in promoting functional mobility. (progressing, not met)    2. Patient will perform transfers/walking with good control to demonstrate improved core strength. (progressing, not met)    3. Patient will report no pain during thoracolumbar active range of motion to promote functional mobility.  " (progressing, not met)    4. Patient will improve impaired lower extremity manual muscle tests  to >/=4/5 to improve strength for functional tasks. (progressing, not met)          Long Term Goals (6 Weeks):   1. Patient will improve FOTO score to </= 57% limited to decrease perceived limitation with maintaining/changing body position. (progressing, not met)    2. Patient will perform walking with good control to demonstrate improved core strength.  (progressing, not met)    3. Patient will improve impaired lower extremity manual muscle tests to >/=4+/5 to improve strength for functional tasks.  (progressing, not met)    4. Patient will tolerate standing for 60 minutes with no increase in low back pain to return to PLOF.  (progressing, not met)      PLAN   Plan of care Certification: 7/3/2023 to 10/3/23    Focus on LE/core strength and endurance with emphasis on ADL performance.     Bill Montes De Oca, PTA

## 2023-08-14 ENCOUNTER — CLINICAL SUPPORT (OUTPATIENT)
Dept: REHABILITATION | Facility: OTHER | Age: 53
End: 2023-08-14
Payer: COMMERCIAL

## 2023-08-14 DIAGNOSIS — R29.898 WEAKNESS OF BOTH LOWER EXTREMITIES: Primary | ICD-10-CM

## 2023-08-14 PROCEDURE — 97112 NEUROMUSCULAR REEDUCATION: CPT | Mod: PN

## 2023-08-14 PROCEDURE — 97110 THERAPEUTIC EXERCISES: CPT | Mod: PN

## 2023-08-14 NOTE — PROGRESS NOTES
"[facility]   Physical Therapy Updated Plan of Care     Name: Kathrine Ashraf  Clinic Number: [id number]    Therapy Diagnosis:   Encounter Diagnosis   Name Primary?    Weakness of both lower extremities Yes           Physician: Monse Daniels DPM    Visit Date: 8/14/2023    Physician Orders: Physical Therapy Evaluate and Treat  Medical Diagnosis from Referral: gait disturbance  Evaluation Date: 7/3/23  Authorization Period Expiration: 5/31/24  Plan of Care Expiration: 8/14/23- 11/14/23  Visit # / Visits authorized: 8/20       Precautions: Standard    Time In: 0500pm  Time Out: 0600pm  Total Billable Time: 60 minutes      SUBJECTIVE     Pt reports: that she is doing well today. Pt states that she is having much less back pain. Pt wants to see a vascular doctor for her anterior hip pain.  She was compliant with home exercise program.  Response to previous treatment: felt fine, no issues   Functional change: less back pain    Pain: 1/10  Location: low back     OBJECTIVE     Objective Measures updated at progress report unless specified.       TREATMENT     Kathrine received the treatments listed below:        therapeutic activities to improve functional performance for 00 minutes, including:      received therapeutic exercises to develop strength and ROM for 30 minutes including:    Bridging, 3" hold x 30  PPT, 3" hold x 20  LTR on red ball, 2 min - caused hip pain  Prone press ups 10x 10" hold  JAYCOB x 4 minutes   Seated silver SB rollouts, 10x10"  Prone hip extension 30x B   Standing extensions to tolerance 10x10"      neuromuscular re-education activities to improve: Coordination, Sense, and Proprioception for 30 minutes. The following activities were included:  TA activation x10  Marching with TA activation 2x10  SLR w/ pilates ring press down, 20x R/L  SL hip abduction x20  SL clamshell x20  Standing rows + TrA GTB, 3x10  Pallof press 20x GTB  Seated sciatic nerve glides, 20x R/L  Dead lifts to " "6" step, 10# KB, 20x  Sit to stand with pilates ring/TrA squeeze, 20x    received the following manual therapy techniques: Soft tissue Mobilization were applied to the: 0 minutes, including:      PATIENT EDUCATION AND HOME EXERCISES     Home Exercises Provided and Patient Education Provided     Education provided:   - Posture and exercise form/rationale     Written Home Exercises Provided: Patient instructed to cont prior HEP. Exercises were reviewed and Kathrine was able to demonstrate them prior to the end of the session.  Kathrine demonstrated good  understanding of the education provided. See EMR under Patient Instructions for exercises provided during therapy sessions    ASSESSMENT   Pt presents to physical therapy treatment with decreased active range of motion, decreased strength, poor posture, and increased pain due to lumbar spine impairments. These factors are negatively impacting the patient's ability to complete their activities of daily living and work duties. Pt is progressing towards their short and long term goals as evidenced by decreased pain, improved strength and range of motion, and improved FOTO score. These goals remain appropriate for the plan of care. Pt would benefit from continued skilled physical therapy treatment to address these deficits so that they may return to their prior level of function with improved quality of life.          Kathrine Is progressing well towards her goals.   Pt prognosis is Good.     Pt will continue to benefit from skilled outpatient physical therapy to address the deficits listed in the problem list box on initial evaluation, provide pt/family education and to maximize pt's level of independence in the home and community environment.     Pt's spiritual, cultural and educational needs considered and pt agreeable to plan of care and goals.     Anticipated barriers to physical therapy: None      GOALS:  Short Term Goals (4 Weeks):  1. Patient will be compliant with home " exercise program to supplement therapy in promoting functional mobility. ( met)    2. Patient will perform transfers/walking with good control to demonstrate improved core strength. (met)    3. Patient will report no pain during thoracolumbar active range of motion to promote functional mobility.  (met)    4. Patient will improve impaired lower extremity manual muscle tests  to >/=4/5 to improve strength for functional tasks. (progressing, not met)          Long Term Goals (6 Weeks):   1. Patient will improve FOTO score to </= 57% limited to decrease perceived limitation with maintaining/changing body position. (progressing, not met)    2. Patient will perform walking with good control to demonstrate improved core strength.  (met)    3. Patient will improve impaired lower extremity manual muscle tests to >/=4+/5 to improve strength for functional tasks.  (progressing, not met)    4. Patient will tolerate standing for 60 minutes with no increase in low back pain to return to PLOF.  (met)      PLAN   Plan of care Certification: 8/14/23- 11/14/23    Focus on LE/core strength and endurance with emphasis on ADL performance.     Bekci Cornell, PT

## 2023-08-14 NOTE — PLAN OF CARE
"[facility]   Physical Therapy Updated Plan of Care     Name: Kathrine Ashraf  Clinic Number: [id number]    Therapy Diagnosis:   Encounter Diagnosis   Name Primary?    Weakness of both lower extremities Yes           Physician: Monse Daniels DPM    Visit Date: 8/14/2023    Physician Orders: Physical Therapy Evaluate and Treat  Medical Diagnosis from Referral: gait disturbance  Evaluation Date: 7/3/23  Authorization Period Expiration: 5/31/24  Plan of Care Expiration: 8/14/23- 11/14/23  Visit # / Visits authorized: 8/20       Precautions: Standard    Time In: 0500pm  Time Out: 0600pm  Total Billable Time: 60 minutes      SUBJECTIVE     Pt reports: that she is doing well today. Pt states that she is having much less back pain. Pt wants to see a vascular doctor for her anterior hip pain.  She was compliant with home exercise program.  Response to previous treatment: felt fine, no issues   Functional change: less back pain    Pain: 1/10  Location: low back     OBJECTIVE     Objective Measures updated at progress report unless specified.       TREATMENT     Kathrine received the treatments listed below:        therapeutic activities to improve functional performance for 00 minutes, including:      received therapeutic exercises to develop strength and ROM for 30 minutes including:    Bridging, 3" hold x 30  PPT, 3" hold x 20  LTR on red ball, 2 min - caused hip pain  Prone press ups 10x 10" hold  JAYCOB x 4 minutes   Seated silver SB rollouts, 10x10"  Prone hip extension 30x B   Standing extensions to tolerance 10x10"      neuromuscular re-education activities to improve: Coordination, Sense, and Proprioception for 30 minutes. The following activities were included:  TA activation x10  Marching with TA activation 2x10  SLR w/ pilates ring press down, 20x R/L  SL hip abduction x20  SL clamshell x20  Standing rows + TrA GTB, 3x10  Pallof press 20x GTB  Seated sciatic nerve glides, 20x R/L  Dead lifts to " "6" step, 10# KB, 20x  Sit to stand with pilates ring/TrA squeeze, 20x    received the following manual therapy techniques: Soft tissue Mobilization were applied to the: 0 minutes, including:      PATIENT EDUCATION AND HOME EXERCISES     Home Exercises Provided and Patient Education Provided     Education provided:   - Posture and exercise form/rationale     Written Home Exercises Provided: Patient instructed to cont prior HEP. Exercises were reviewed and Kathrine was able to demonstrate them prior to the end of the session.  Kathrine demonstrated good  understanding of the education provided. See EMR under Patient Instructions for exercises provided during therapy sessions    ASSESSMENT   Pt presents to physical therapy treatment with decreased active range of motion, decreased strength, poor posture, and increased pain due to lumbar spine impairments. These factors are negatively impacting the patient's ability to complete their activities of daily living and work duties. Pt is progressing towards their short and long term goals as evidenced by decreased pain, improved strength and range of motion, and improved FOTO score. These goals remain appropriate for the plan of care. Pt would benefit from continued skilled physical therapy treatment to address these deficits so that they may return to their prior level of function with improved quality of life.          Kathrine Is progressing well towards her goals.   Pt prognosis is Good.     Pt will continue to benefit from skilled outpatient physical therapy to address the deficits listed in the problem list box on initial evaluation, provide pt/family education and to maximize pt's level of independence in the home and community environment.     Pt's spiritual, cultural and educational needs considered and pt agreeable to plan of care and goals.     Anticipated barriers to physical therapy: None      GOALS:  Short Term Goals (4 Weeks):  1. Patient will be compliant with home " exercise program to supplement therapy in promoting functional mobility. ( met)    2. Patient will perform transfers/walking with good control to demonstrate improved core strength. (met)    3. Patient will report no pain during thoracolumbar active range of motion to promote functional mobility.  (met)    4. Patient will improve impaired lower extremity manual muscle tests  to >/=4/5 to improve strength for functional tasks. (progressing, not met)          Long Term Goals (6 Weeks):   1. Patient will improve FOTO score to </= 57% limited to decrease perceived limitation with maintaining/changing body position. (progressing, not met)    2. Patient will perform walking with good control to demonstrate improved core strength.  (met)    3. Patient will improve impaired lower extremity manual muscle tests to >/=4+/5 to improve strength for functional tasks.  (progressing, not met)    4. Patient will tolerate standing for 60 minutes with no increase in low back pain to return to PLOF.  (met)      PLAN   Plan of care Certification: 8/14/23- 11/14/23    Focus on LE/core strength and endurance with emphasis on ADL performance.     Becki Cornell, PT

## 2023-08-16 ENCOUNTER — PATIENT MESSAGE (OUTPATIENT)
Dept: NEUROLOGY | Facility: CLINIC | Age: 53
End: 2023-08-16
Payer: COMMERCIAL

## 2023-08-22 ENCOUNTER — CLINICAL SUPPORT (OUTPATIENT)
Dept: REHABILITATION | Facility: OTHER | Age: 53
End: 2023-08-22
Payer: COMMERCIAL

## 2023-08-22 DIAGNOSIS — R29.898 WEAKNESS OF BOTH LOWER EXTREMITIES: Primary | ICD-10-CM

## 2023-08-22 PROCEDURE — 97112 NEUROMUSCULAR REEDUCATION: CPT | Mod: PN

## 2023-08-22 PROCEDURE — 97110 THERAPEUTIC EXERCISES: CPT | Mod: PN

## 2023-08-22 NOTE — PROGRESS NOTES
"  [facility]   Physical Therapy Daily Treatment Note    Name: Kathrine Ashraf  Clinic Number: [id number]    Therapy Diagnosis:   Encounter Diagnosis   Name Primary?    Weakness of both lower extremities Yes           Physician: Monse Daniels DPM    Visit Date: 8/22/2023    Physician Orders: Physical Therapy Evaluate and Treat  Medical Diagnosis from Referral: gait disturbance  Evaluation Date: 7/3/23  Authorization Period Expiration: 5/31/24  Plan of Care Expiration: 8/14/23- 11/14/23  Visit # / Visits authorized: 8/20       Precautions: Standard    Time In: 230pm  Time Out: 300pm  Total Billable Time: 30 minutes      SUBJECTIVE     Pt reports: that she is doing well today. Patient continues to feel stronger with ADLs and work tasks. She stated that she may want to get a standing desk at work to be able to stand while doing her job.    She was compliant with home exercise program.  Response to previous treatment: felt fine, no issues   Functional change: less back pain    Pain: 1/10  Location: low back     OBJECTIVE     Objective Measures updated at progress report unless specified.       TREATMENT     Kathrine received the treatments listed below:        therapeutic activities to improve functional performance for 00 minutes, including:      received therapeutic exercises to develop strength and ROM for 15 minutes including:    Bridging, 3" hold x 30  PPT, 3" hold x 20  LTR on red ball, 2 min - caused hip pain  Prone press ups 10x 10" hold  JAYCOB x 4 minutes   Seated silver SB rollouts, 10x10"  Prone hip extension 30x B   Standing extensions to tolerance 10x10"  Standing hip abduction 2x10      neuromuscular re-education activities to improve: Coordination, Sense, and Proprioception for 15 minutes. The following activities were included:  TA activation x10  Marching with TA activation 2x10  SLR w/ pilates ring press down, 20x R/L  SL hip abduction x20  SL clamshell x20  Standing rows + TrA GTB, " "3x10  Pallof press 20x GTB  Seated sciatic nerve glides, 20x R/L  Dead lifts to 6" step, 10# KB, 20x  Sit to stand with pilates ring/TrA squeeze, 20x  TRX squats 2x10  Hesitation marches 2x80'      received the following manual therapy techniques: Soft tissue Mobilization were applied to the: 0 minutes, including:      PATIENT EDUCATION AND HOME EXERCISES     Home Exercises Provided and Patient Education Provided     Education provided:   - Posture and exercise form/rationale     Written Home Exercises Provided: Patient instructed to cont prior HEP. Exercises were reviewed and Kathrine was able to demonstrate them prior to the end of the session.  Kathrine demonstrated good  understanding of the education provided. See EMR under Patient Instructions for exercises provided during therapy sessions    ASSESSMENT     Pt tolerated therapeutic interventions well with no complaint of increased pain. Patient reported relief after physical therapy treatment today. She feels like the exercises are helping. She was able to perform more functional strengthening exercise today with good tolerance. Patient had to perform a 30 min treatment 2nd to work obligations. We will continue to progress as tolerated.        Kathrine Is progressing well towards her goals.   Pt prognosis is Good.     Pt will continue to benefit from skilled outpatient physical therapy to address the deficits listed in the problem list box on initial evaluation, provide pt/family education and to maximize pt's level of independence in the home and community environment.     Pt's spiritual, cultural and educational needs considered and pt agreeable to plan of care and goals.     Anticipated barriers to physical therapy: None      GOALS:  Short Term Goals (4 Weeks):  1. Patient will be compliant with home exercise program to supplement therapy in promoting functional mobility. ( met)    2. Patient will perform transfers/walking with good control to demonstrate improved core " strength. (met)    3. Patient will report no pain during thoracolumbar active range of motion to promote functional mobility.  (met)    4. Patient will improve impaired lower extremity manual muscle tests  to >/=4/5 to improve strength for functional tasks. (progressing, not met)          Long Term Goals (6 Weeks):   1. Patient will improve FOTO score to </= 57% limited to decrease perceived limitation with maintaining/changing body position. (progressing, not met)    2. Patient will perform walking with good control to demonstrate improved core strength.  (met)    3. Patient will improve impaired lower extremity manual muscle tests to >/=4+/5 to improve strength for functional tasks.  (progressing, not met)    4. Patient will tolerate standing for 60 minutes with no increase in low back pain to return to PLOF.  (met)      PLAN   Plan of care Certification: 8/14/23- 11/14/23    Focus on LE/core strength and endurance with emphasis on ADL performance.     Lico Arriaga, PT

## 2023-08-29 ENCOUNTER — OFFICE VISIT (OUTPATIENT)
Dept: DERMATOLOGY | Facility: CLINIC | Age: 53
End: 2023-08-29
Payer: COMMERCIAL

## 2023-08-29 DIAGNOSIS — L73.2 HIDRADENITIS SUPPURATIVA: Primary | ICD-10-CM

## 2023-08-29 DIAGNOSIS — L70.0 ACNE VULGARIS: ICD-10-CM

## 2023-08-29 PROCEDURE — 1160F RVW MEDS BY RX/DR IN RCRD: CPT | Mod: CPTII,S$GLB,, | Performed by: DERMATOLOGY

## 2023-08-29 PROCEDURE — 1159F MED LIST DOCD IN RCRD: CPT | Mod: CPTII,S$GLB,, | Performed by: DERMATOLOGY

## 2023-08-29 PROCEDURE — 1160F PR REVIEW ALL MEDS BY PRESCRIBER/CLIN PHARMACIST DOCUMENTED: ICD-10-PCS | Mod: CPTII,S$GLB,, | Performed by: DERMATOLOGY

## 2023-08-29 PROCEDURE — 99204 OFFICE O/P NEW MOD 45 MIN: CPT | Mod: 25,S$GLB,, | Performed by: DERMATOLOGY

## 2023-08-29 PROCEDURE — 1159F PR MEDICATION LIST DOCUMENTED IN MEDICAL RECORD: ICD-10-PCS | Mod: CPTII,S$GLB,, | Performed by: DERMATOLOGY

## 2023-08-29 PROCEDURE — 99999 PR PBB SHADOW E&M-EST. PATIENT-LVL III: ICD-10-PCS | Mod: PBBFAC,,, | Performed by: DERMATOLOGY

## 2023-08-29 PROCEDURE — 11900 INJECT SKIN LESIONS </W 7: CPT | Mod: S$GLB,,, | Performed by: DERMATOLOGY

## 2023-08-29 PROCEDURE — 11900 PR INJECTION INTO SKIN LESIONS, UP TO 7: ICD-10-PCS | Mod: S$GLB,,, | Performed by: DERMATOLOGY

## 2023-08-29 PROCEDURE — 99204 PR OFFICE/OUTPT VISIT, NEW, LEVL IV, 45-59 MIN: ICD-10-PCS | Mod: 25,S$GLB,, | Performed by: DERMATOLOGY

## 2023-08-29 PROCEDURE — 99999 PR PBB SHADOW E&M-EST. PATIENT-LVL III: CPT | Mod: PBBFAC,,, | Performed by: DERMATOLOGY

## 2023-08-29 RX ORDER — TRIAMCINOLONE ACETONIDE 40 MG/ML
40 INJECTION, SUSPENSION INTRA-ARTICULAR; INTRAMUSCULAR ONCE
Status: DISCONTINUED | OUTPATIENT
Start: 2023-08-29 | End: 2023-09-22

## 2023-08-29 RX ORDER — SPIRONOLACTONE 50 MG/1
TABLET, FILM COATED ORAL
Qty: 60 TABLET | Refills: 3 | Status: SHIPPED | OUTPATIENT
Start: 2023-08-29 | End: 2024-03-19

## 2023-08-29 RX ORDER — CLINDAMYCIN PHOSPHATE 11.9 MG/ML
SOLUTION TOPICAL
Qty: 60 ML | Refills: 3 | Status: SHIPPED | OUTPATIENT
Start: 2023-08-29

## 2023-08-29 NOTE — PROGRESS NOTES
Subjective:      Patient ID:  Kathrine Ashraf is a 53 y.o. female who presents for   Chief Complaint   Patient presents with    Cyst     Right axilla     Cyst - Initial  Affected locations: right axilla and left axilla  Duration: 9 months  Signs / symptoms: tender and non-healing  Severity: moderate  Timing: constant  Relieving factors/Treatments tried: antibiotics (lanced area)  Improvement on treatment: mild      Review of Systems   Skin:  Positive for abscesses.   Hematologic/Lymphatic: Bruises/bleeds easily.       Objective:   Physical Exam   Constitutional: She appears well-developed and well-nourished. She is obese.  No distress.   Neurological: She is alert and oriented to person, place, and time. She is not disoriented.   Psychiatric: She has a normal mood and affect.   Skin:   Areas Examined (abnormalities noted in diagram):   Head / Face Inspection Performed  Chest / Axilla Inspection Performed                Diagram Legend     Erythematous scaling macule/papule c/w actinic keratosis       Vascular papule c/w angioma      Pigmented verrucoid papule/plaque c/w seborrheic keratosis      Yellow umbilicated papule c/w sebaceous hyperplasia      Irregularly shaped tan macule c/w lentigo     1-2 mm smooth white papules consistent with Milia      Movable subcutaneous cyst with punctum c/w epidermal inclusion cyst      Subcutaneous movable cyst c/w pilar cyst      Firm pink to brown papule c/w dermatofibroma      Pedunculated fleshy papule(s) c/w skin tag(s)      Evenly pigmented macule c/w junctional nevus     Mildly variegated pigmented, slightly irregular-bordered macule c/w mildly atypical nevus      Flesh colored to evenly pigmented papule c/w intradermal nevus       Pink pearly papule/plaque c/w basal cell carcinoma      Erythematous hyperkeratotic cursted plaque c/w SCC      Surgical scar with no sign of skin cancer recurrence      Open and closed comedones      Inflammatory papules and pustules       Verrucoid papule consistent consistent with wart     Erythematous eczematous patches and plaques     Dystrophic onycholytic nail with subungual debris c/w onychomycosis     Umbilicated papule    Erythematous-base heme-crusted tan verrucoid plaque consistent with inflamed seborrheic keratosis     Erythematous Silvery Scaling Plaque c/w Psoriasis     See annotation      Assessment / Plan:        Hidradenitis suppurativa  -     clindamycin (CLEOCIN T) 1 % external solution; AAA axilla bid  Dispense: 60 mL; Refill: 3  -     spironolactone (ALDACTONE) 50 MG tablet; Start with 1 po qday, increase to 2 po qday as tolerated  Dispense: 60 tablet; Refill: 3  -     triamcinolone acetonide injection 40 mg  -     PA [id number] ACETONIDE INJ PER 10MG  -     PA INJECTION INTO SKIN LESIONS, UP TO 7    Intralesional Kenalog 20mg/cc (0.4 cc total) injected into 1 lesions on the left axilla today after obtaining verbal consent including risk of surrounding hypopigmentation. Patient tolerated procedure well.    Units:2  NDC for Kenalog 40mg/cc:  [id number]    Wash daily with hibiclens wash        Acne vulgaris  -     spironolactone (ALDACTONE) 50 MG tablet; Start with 1 po qday, increase to 2 po qday as tolerated  Dispense: 60 tablet; Refill: 3             No follow-ups on file.

## 2023-09-20 NOTE — PROGRESS NOTES
"Subjective:       Patient ID: Kathrine Ashraf is a 52 y.o. female.    Vitals:  height is 5' 2" (1.575 m) and weight is 103 kg (227 lb). Her tympanic temperature is 97.6 °F (36.4 °C). Her blood pressure is 138/86 and her pulse is 73. Her respiration is 16 and oxygen saturation is 98%.     Chief Complaint: Abscess    Patient noticed the small knot this week.   Provider note begins below:  Pt reports she thinks she has HS, has had 3 flare ups under her left axilla and she has a knot under her right arm that is small. Denies any fever or chills. She says she had her left abscess over the summer.  Denies ever being diagnosed with HS in the past.  She denies wearing any deodorant sometimes uses natural deodorant or baking soda if she does.    Abscess  Chronicity:  NewProgression Since Onset: gradually worsening  Location:  Shoulder/arm  Associated Symptoms: no fever, no chills, no sweats  Characteristics: painful and bruising    Characteristics: not draining, no itching, no redness, no dryness, no scaling, no peeling, no swelling and no blistering    Pain Scale:  7/10  Treatments Tried:  Warm compresses (motrin)    Constitution: Negative for activity change, appetite change, chills, sweating, fatigue, fever, unexpected weight change and generalized weakness.   Skin:  Positive for abscess.     Objective:      Physical Exam   Constitutional: She is oriented to person, place, and time. She appears well-developed.  Non-toxic appearance. She does not appear ill. No distress.   HENT:   Head: Normocephalic and atraumatic.   Ears:   Right Ear: External ear normal.   Left Ear: External ear normal.   Nose: Nose normal.   Mouth/Throat: Oropharynx is clear and moist.   Eyes: Conjunctivae, EOM and lids are normal. Pupils are equal, round, and reactive to light.   Neck: Trachea normal and phonation normal. Neck supple.   Musculoskeletal: Normal range of motion.         General: Normal range of motion.   Neurological: She is alert and " No urine specimen at the time of vitals. oriented to person, place, and time.   Skin: Skin is warm, dry, intact, not diaphoretic and abscessed. Capillary refill takes less than 2 seconds.        Psychiatric: Her speech is normal and behavior is normal. Judgment and thought content normal.   Nursing note and vitals reviewed.      Assessment:       1. Abscess of left axilla    2. Encounter to establish care        Incision & Drainage     Date/Time: 12/28/2022 5:00 PM  Performed by: Nivia Gonzalez NP  Authorized by: Nivia Gonzalez NP         Type:  Abscess  Body area:  Upper extremity  Location details:  Left arm  Anesthesia:  Local infiltration  Local anesthetic: Lidocaine 1% without epinephrine  Anesthetic total (ml):  2  Scalpel size:  11  Incision type:  Single straight  Incision depth: dermal     Complexity:  Simple  Drainage:  Pus  Drainage amount:  Moderate  Wound treatment:  Incision, drainage and wound left open  Packing material:  None  Patient tolerance:  Patient tolerated the procedure well with no immediate complications  Plan:       We discussed HS in detail, her abscess is very fluctuant.  I have provided her with resources to establish primary care.  Encouraged warm compresses.  And follow-up with Dermatology or sometimes General surgery as well.  Did well with doxy in the past    Discussed results/diagnosis/plan with patient in clinic. Strict precautions given to patient to monitor for worsening signs and symptoms. Advised to follow up with PCP or specialist.    Explained side effects of medications prescribed with patient and informed him/her to discontinue use if he/she has any side effects and to inform UC or PCP if this occurs. All questions answered. Strict ED verses clinic return precautions stressed and given in depth. Advised if symptoms worsens of fail to improve he/she should go to the Emergency Room. Discharge and follow-up instructions given verbally/printed with the patient who expressed understanding and willingness to  comply with my recommendations. Patient voiced understanding and in agreement with current treatment plan. Patient exits the exam room in no acute distress. Conversant and engaged during discharge discussion, verbalized understanding.      Abscess of left axilla  -     Incision & Drainage    Encounter to establish care  -     Ambulatory referral/consult to [facility]  -     doxycycline (VIBRA-TABS) 100 MG tablet; Take 1 tablet (100 mg total) by mouth 2 (two) times daily. for 7 days  Dispense: 14 tablet; Refill: 0  -     mupirocin (BACTROBAN) 2 % ointment; Apply topically 3 (three) times daily. for 7 days  Dispense: 15 g; Refill: 0           Medical Decision Making:   Other:   I have discussed this case with another health care provider.       <> Summary of the Discussion: Discussed with Dr. Worrell, discussed with her findings on exam with an abscess to the left axilla that is fluctuant.  Advised for IN D to allow for drainage.     Patient Instructions   General Discharge Instructions   PLEASE READ YOUR DISCHARGE INSTRUCTIONS ENTIRELY AS IT CONTAINS IMPORTANT INFORMATION.  If you were prescribed a narcotic or controlled medication, do not drive or operate heavy equipment or machinery while taking these medications.  If you were prescribed antibiotics, please take them to completion.  You must understand that you've received an Urgent Care treatment only and that you may be released before all your medical problems are known or treated. You, the patient, will arrange for follow up care as instructed.    OVER THE COUNTER RECOMMENDATIONS/SUGGESTIONS.    Make sure to stay well hydrated.    Use Nasal Saline to mechanically move any post nasal drip from your eustachian tube or from the back of your throat.    Use warm salt water gargles to ease your throat pain. Warm salt water gargles as needed for sore throat- 1/2 tsp salt to 1 cup warm water, gargle as desired.    Use an antihistamine such as Claritin, Zyrtec or  Allegra to dry you out.    Use pseudoephedrine (behind the counter) to decongest. Pseudoephedrine 30 mg up to 240 mg /day. It can raise your blood pressure and give you palpitations.    Use mucinex (guaifenesin) to break up mucous up to 2400mg/day to loosen any mucous.    The mucinex DM pill has a cough suppressant that can be sedating. It can be used at night to stop the tickle at the back of your throat.    You can use Mucinex D (it has guaifenesin and a high dose of pseudoephedrine) in the mornings to help decongest.    Use Afrin in each nare for no longer than 3 days, as it is addictive. It can also dry out your mucous membranes and cause elevated blood pressure. This is especially useful if you are flying.    Use Flonase 1-2 sprays/nostril per day. It is a local acting steroid nasal spray, if you develop a bloody nose, stop using the medication immediately.    Sometimes Nyquil at night is beneficial to help you get some rest, however it is sedating and it does have an antihistamine, and tylenol.    Honey is a natural cough suppressant that can be used.    Tylenol up to 4,000 mg a day is safe for short periods and can be used for body aches, pain, and fever. However in high doses and prolonged use it can cause liver irritation.    Ibuprofen is a non-steroidal anti-inflammatory that can be used for body aches, pain, and fever.However it can also cause stomach irritation if over used.     Follow up with your PCP or specialty clinic as instructed in the next 2-3 days if not improved or as needed. You can call [phone] to schedule an appointment with appropriate provider.      If you condition worsens, we recommend that you receive another evaluation at the emergency room immediately or contact your primary medical clinic's after hours call service to discuss your concerns.      Please return here or go to the Emergency Department for any concerns or worsening condition.   You can also call [phone] to  schedule an appointment with the appropriate provider.    Please return here or go to the Emergency Department for any concerns or worsening of condition.    Thank you for choosing [facility]!    Our goal in the Urgent Care is to always provide outstanding medical care. You may receive a survey by mail or e-mail in the next week regarding your experience today. We would greatly appreciate you completing and returning the survey. Your feedback provides us with a way to recognize our staff who provide very good care, and it helps us learn how to improve when your experience was below our aspiration of excellence.      We appreciate you trusting us with your medical care. We hope you feel better soon. We will be happy to take care of you for all of your future medical needs.    Sincerely,    Nivia Gonzalez, LIBBY                                             Abscess/Cellulitis   If your condition worsens or fails to improve we recommend that you receive another evaluation at the ER immediately or contact your PCP to discuss your concerns or return here. You must understand that you've received an urgent care treatment only and that you may be released before all your medical problems are known or treated. You the patient will arrange for follouwp care as instructed.     Soak affected in warm water with epsom salts several times a day. Dilute 2 cups per gallon of water. If you cannot soak  apply warm compresses to the affected area for 20 min, 3-5 times per day and apply warm compresses frequently. If you cannot soak, use the shower head.  Use warm compresses for 20 minutes 3-5 times a day. Avoid picking or manipulating the wound. Clean the wound twice a day and put the antibiotic ointment on it. You should seek ER treatment if fever, increase pain, swelling or other signs of increasing infection.   If you were prescribed antibiotics, please take them to completion  If you are female and on BCP use additional  methods to prevent pregnancy while on antibiotics and for one cycle after.   If you were given narcotics do not drive or operate heavy equipment or machinery while taking these medications.   Tylenol or ibuprofen can also be used as directed for pain unless you have an allergy to them or medical condition such as stomach ulcers, kidney or liver disease or blood thinners etc for which you should not be taking these type of medications.   Return in 48- 72 hours for recheck.         Access Navigator team who handles Medicaid referrals INTO [facility]. The main line for that team (for your referrals into [facility]) is [phone] Access Line - helps Medicaid patients to find Medicaid care OUTSIDE of [facility]. Medicaid Access Line contact is: [phone]  [email or web address] [place] [email or web address] - 5580890949 - a community line that can help refer   [facility]:  Marketplace and Medicaid Enrollment Assistance, Free or Low-Cost Care  Name Address Phone # Type of Care  [facility] [address] [place] [address] [phone] Primary Care  [facility] [place] [address], [place] [address] [phone] Primary Care  [facility] [address] [place] [address] [phone] Primary Care  [facility] [address] [place], [place] [id number] [phone] Primary Care  [facility] [address] [facility], [place] [id number] [phone] Primary Care  [facility] [address], [place]  [address] [phone] Primary Care  [facility]  [facility] ([place])  [address],  [place] [id number] [phone] Primary Care  [facility] ([place]) [address] [facility], [place] [id number] [phone] Primary  Care  [facility] [address], [place]  [address]  [phone] Primary Care  [facility] [address] [place] [address] [phone] Primary Care  [facility] [address], [place]  [address]  [phone] Primary Care  [facility] [address] [place] [address] [phone] Primary Care  [facility] [address], [place] [address]  [phone] Primary Care  [facility] [address], [place] [address]  [phone] Primary Care  [facility] ([place]) [address] [place] [address] [phone] Primary Care  [facility] [address] [place] [address] [phone] Primary Care  [facility] ([place]) [address] [place]  [address]  [phone] Primary Care  [facility] ([facility]) [address] [place]  [address]  [phone] HIV/AIDS care  [facility] ([facility])  [address] [place] [address]  [phone] Primary Care  [facility] ([place])  [address] [place] [address] [phone] Primary Care  [facility] ([place]) [address] [facility] [address] [phone] Primary Care  [facility] ([place]) [address] [place] [address] [phone] Primary Care  [facility]  ([facility])  [address] [facility] [place] [address] [phone] Primary Care  [facility] ([facility]) [address] [place] [address] [phone] Primary Care  [facility] [address] [facility] [place]  [place] [address] [phone] Primary Care  [facility] [address], [address] [address] [phone] Primary Care  Affordable Care Act Navigators  Navigators are individuals or organizations that are trained to help consumers, small businesses, and  their employees as they look for health coverage options through the Marketplace, including completing  eligibility and enrollment forms. These individuals and organizations are required to be unbiased. Their  services are free to consumers.  [facility] ([facility])  Request an appointment through:  [email or web address] or [phone]  [address] [place] [address] (office covers all of [facility])  [facility] [address] [place] [address]  10am -7pm Monday to Friday  10am to 2pm on Saturdays  [phone]  [email or web address]

## 2023-09-22 ENCOUNTER — OFFICE VISIT (OUTPATIENT)
Dept: DERMATOLOGY | Facility: CLINIC | Age: 53
End: 2023-09-22
Payer: COMMERCIAL

## 2023-09-22 DIAGNOSIS — L73.2 HIDRADENITIS SUPPURATIVA: Primary | ICD-10-CM

## 2023-09-22 PROCEDURE — 11900 INJECT SKIN LESIONS </W 7: CPT | Mod: S$GLB,,, | Performed by: DERMATOLOGY

## 2023-09-22 PROCEDURE — 1160F RVW MEDS BY RX/DR IN RCRD: CPT | Mod: CPTII,S$GLB,, | Performed by: DERMATOLOGY

## 2023-09-22 PROCEDURE — 1159F PR MEDICATION LIST DOCUMENTED IN MEDICAL RECORD: ICD-10-PCS | Mod: CPTII,S$GLB,, | Performed by: DERMATOLOGY

## 2023-09-22 PROCEDURE — 99214 OFFICE O/P EST MOD 30 MIN: CPT | Mod: 25,S$GLB,, | Performed by: DERMATOLOGY

## 2023-09-22 PROCEDURE — 1160F PR REVIEW ALL MEDS BY PRESCRIBER/CLIN PHARMACIST DOCUMENTED: ICD-10-PCS | Mod: CPTII,S$GLB,, | Performed by: DERMATOLOGY

## 2023-09-22 PROCEDURE — 99999 PR PBB SHADOW E&M-EST. PATIENT-LVL III: CPT | Mod: PBBFAC,,, | Performed by: DERMATOLOGY

## 2023-09-22 PROCEDURE — 99999 PR PBB SHADOW E&M-EST. PATIENT-LVL III: ICD-10-PCS | Mod: PBBFAC,,, | Performed by: DERMATOLOGY

## 2023-09-22 PROCEDURE — 1159F MED LIST DOCD IN RCRD: CPT | Mod: CPTII,S$GLB,, | Performed by: DERMATOLOGY

## 2023-09-22 PROCEDURE — 11900 PR INJECTION INTO SKIN LESIONS, UP TO 7: ICD-10-PCS | Mod: S$GLB,,, | Performed by: DERMATOLOGY

## 2023-09-22 PROCEDURE — 99214 PR OFFICE/OUTPT VISIT, EST, LEVL IV, 30-39 MIN: ICD-10-PCS | Mod: 25,S$GLB,, | Performed by: DERMATOLOGY

## 2023-09-22 RX ORDER — DOXYCYCLINE 100 MG/1
CAPSULE ORAL
Qty: 14 CAPSULE | Refills: 0 | Status: SHIPPED | OUTPATIENT
Start: 2023-09-22 | End: 2024-03-19

## 2023-09-22 RX ORDER — TRIAMCINOLONE ACETONIDE 40 MG/ML
40 INJECTION, SUSPENSION INTRA-ARTICULAR; INTRAMUSCULAR ONCE
Status: DISCONTINUED | OUTPATIENT
Start: 2023-09-22 | End: 2023-12-12

## 2023-09-22 RX ORDER — METFORMIN HYDROCHLORIDE 500 MG/1
TABLET, EXTENDED RELEASE ORAL
Qty: 30 TABLET | Refills: 3 | Status: SHIPPED | OUTPATIENT
Start: 2023-09-22 | End: 2024-02-18

## 2023-09-22 NOTE — ASSESSMENT & PLAN NOTE
"Today's Plan:      LIFESTYLE:    Weight loss and dietary modifications - Obesity is associated with more severe HS:  Avoid sugars and processed foods  Limit "white" foods ie. Bread, rice, pasta, potatoes  - recommend limiting to 1/2 cup per serving  Limit dairy     Keep skin cool as overheating can flare HS    Avoid shaving affected areas and wearing tight fitting clothing as friction exacerbates disease. Friction and mechanical stress acts as a trigger for HS flare.     TREATMENT:    For flares: Dilute bleach to affected areas: compresses daily to affected/flared area(s) - can use CLn wash on warm wet rag as compress  If have groin involvement, may want to take dilute bleach baths (daily when flared; 2x/week for maintenance). Recipe for dilute bleach bath:    add 1/2 cup of regular strength (6%) bleach to a full tub of lukewarm water and soak for 10 - 15 minutes. (use 1/4 cup for a half-full tub of water)   OR Add Clorox (2 teaspoons/gal of water, max. 2/3 cup) to bath water     EVERYDAY:  Wash affected areas with Hibiclens +/- Benzoyl peroxide every day    Apply Clindamycin solution 1x/day to "quiet affected areas" and 2x/day to flared affected areas    Take Spironolactone (aldactone) at 50 mg every day    Take Doxycycline 100mg 2x/day x 1 week  Take with food and not within 1 hour of lying down  Side effects: sun sensitivity, GI upset, esophageal erosion/ulceration (rare - take with plenty of water and do not lie down within 1 hour of dose)    Take Metformin ER at 500mg every night with dinner -- can decrease # and severity of lesions   SE: diarrhea    Take Turmeric 1000mg every day      CONSIDER:  Adding 3 small cans of V8 juice per week      RESOURCE:  [facility]org      FOR FLARES (new painful bump):  Message office through [facility] for injection which will often hasten resolution of tender, red bump    Can apply warm/hot compresses on a painful, deep lump    If notice a foul-smelling drainage, can use " Hydrogen Peroxide to cleanse area followed by application of Medi-honey to open area (apply Medi-honey 1x/day)

## 2023-09-22 NOTE — PATIENT INSTRUCTIONS
"LIFESTYLE:    Weight loss and dietary modifications - Obesity is associated with more severe HS:  Avoid sugars and processed foods  Limit "white" foods ie. Bread, rice, pasta, potatoes  - recommend limiting to 1/2 cup per serving  Limit dairy     Keep skin cool as overheating can flare HS    Avoid shaving affected areas and wearing tight fitting clothing as friction exacerbates disease. Friction and mechanical stress acts as a trigger for HS flare.     TREATMENT:    For flares: Dilute bleach to affected areas: compresses daily to affected/flared area(s) - can use CLn wash on warm wet rag as compress  If have groin involvement, may want to take dilute bleach baths (daily when flared; 2x/week for maintenance). Recipe for dilute bleach bath:    add 1/2 cup of regular strength (6%) bleach to a full tub of lukewarm water and soak for 10 - 15 minutes. (use 1/4 cup for a half-full tub of water)   OR Add Clorox (2 teaspoons/gal of water, max. 2/3 cup) to bath water     EVERYDAY:  Wash affected areas with Hibiclens +/- Benzoyl peroxide every day    Apply Clindamycin solution 1x/day to "quiet affected areas" and 2x/day to flared affected areas    Take Spironolactone (aldactone) at 50 mg every day    Take Doxycycline 100mg 2x/day x 1 week  Take with food and not within 1 hour of lying down  Side effects: sun sensitivity, GI upset, esophageal erosion/ulceration (rare - take with plenty of water and do not lie down within 1 hour of dose)    Take Metformin ER at 500mg every night with dinner -- can decrease # and severity of lesions   SE: diarrhea    Take Turmeric 1000mg every day      CONSIDER:  Adding 3 small cans of V8 juice per week      RESOURCE:  [facility]org      FOR FLARES (new painful bump):  [facility] for [facility] which will often hasten resolution of tender, red bump    Can apply warm/hot compresses on a painful, deep lump    If notice a foul-smelling drainage, can use Hydrogen Peroxide to " cleanse area followed by application of Medi-honey to open area (apply Medi-honey 1x/day)

## 2023-09-22 NOTE — PROGRESS NOTES
Subjective:      Patient ID:  Kathrine Ashraf is a 53 y.o. female who presents for   Chief Complaint   Patient presents with    Hidradenitis Suppurativa     1st visit     This is patient's 1st visit to [facility] for Hidradenitis Suppurativa.     Year started: 2003 - started in 30s  Location started: axilla  Family history of HS: children, father  Smokes: none  Current weight: 230# (stable)    Last seen 8/29/2023 by Dr. Cortez for same.     Past treatments (including topicals, orals, injectables, laser, surgery):  I&D  Antibiotic ointment    Current treatments:   Spironolactone 50 mg 1x/day (Pt took twice - stopped due to diarrhea)  Clindamycin soln 2x/day  Dial soap daily    In the past, pt has been diagnosed with:    PCOS: none  Diabetes: none  HTN: none  Arthritis: OA  Cancer: none    Pt c/o new lesion on left axilla x 1 week. Tender and draining+        Review of Systems   Constitutional:  Positive for weight gain (10# in past year (230#) - but fluctuates). Negative for weight loss.   Gastrointestinal:  Positive for diarrhea (started due to spironolactone (stopped)).   Genitourinary:  Negative for irregular periods (hysterectomy - 2012).   Musculoskeletal:  Positive for arthralgias (knees).   Skin:  Positive for abscesses (left axilla).   Hematologic/Lymphatic: Bruises/bleeds easily.       Objective:   Physical Exam   Constitutional: She appears well-developed and well-nourished. No distress.   Neurological: She is alert and oriented to person, place, and time. She is not disoriented.   Psychiatric: She has a normal mood and affect.   Skin:   Areas Examined (abnormalities noted in diagram):   Chest / Axilla Inspection Performed           Diagram Legend     Erythematous scaling macule/papule c/w actinic keratosis       Vascular papule c/w angioma      Pigmented verrucoid papule/plaque c/w seborrheic keratosis      Yellow umbilicated papule c/w sebaceous hyperplasia      Irregularly shaped tan macule c/w lentigo      1-2 mm smooth white papules consistent with Milia      Movable subcutaneous cyst with punctum c/w epidermal inclusion cyst      Subcutaneous movable cyst c/w pilar cyst      Firm pink to brown papule c/w dermatofibroma      Pedunculated fleshy papule(s) c/w skin tag(s)      Evenly pigmented macule c/w junctional nevus     Mildly variegated pigmented, slightly irregular-bordered macule c/w mildly atypical nevus      Flesh colored to evenly pigmented papule c/w intradermal nevus       Pink pearly papule/plaque c/w basal cell carcinoma      Erythematous hyperkeratotic cursted plaque c/w SCC      Surgical scar with no sign of skin cancer recurrence      Open and closed comedones      Inflammatory papules and pustules      Verrucoid papule consistent consistent with wart     Erythematous eczematous patches and plaques     Dystrophic onycholytic nail with subungual debris c/w onychomycosis     Umbilicated papule    Erythematous-base heme-crusted tan verrucoid plaque consistent with inflamed seborrheic keratosis     Erythematous Silvery Scaling Plaque c/w Psoriasis     See annotation      Assessment / Plan:        Hidradenitis suppurativa  -     triamcinolone acetonide injection 40 mg  -     MN [id number] ACETONIDE INJ PER 10MG  -     MN INJECTION INTO SKIN LESIONS, UP TO 7  -     doxycycline (VIBRAMYCIN) 100 MG Cap; Take 1 po bid with food and not within 1 hour prior to lying down  Dispense: 14 capsule; Refill: 0  -     metFORMIN (GLUCOPHAGE-XR) 500 MG ER 24hr tablet; Take 1 po qday  Dispense: 30 tablet; Refill: 3    Intralesional Kenalog 20mg/cc (0.5 cc total) injected into 1 lesions on the left axilla today after obtaining verbal consent including risk of surrounding hypopigmentation. Patient tolerated procedure well.    Units:2  NDC for Kenalog 40mg/cc:  4300-5807-16            Hidradenitis suppurativa  Today's Plan:      LIFESTYLE:    Weight loss and dietary modifications - Obesity is associated with more severe  "HS:  Avoid sugars and processed foods  Limit "white" foods ie. Bread, rice, pasta, potatoes  - recommend limiting to 1/2 cup per serving  Limit dairy     Keep skin cool as overheating can flare HS    Avoid shaving affected areas and wearing tight fitting clothing as friction exacerbates disease. Friction and mechanical stress acts as a trigger for HS flare.     TREATMENT:    For flares: Dilute bleach to affected areas: compresses daily to affected/flared area(s) - can use CLn wash on warm wet rag as compress  If have groin involvement, may want to take dilute bleach baths (daily when flared; 2x/week for maintenance). Recipe for dilute bleach bath:    add 1/2 cup of regular strength (6%) bleach to a full tub of lukewarm water and soak for 10 - 15 minutes. (use 1/4 cup for a half-full tub of water)   OR Add Clorox (2 teaspoons/gal of water, max. 2/3 cup) to bath water     EVERYDAY:  Wash affected areas with Hibiclens +/- Benzoyl peroxide every day    Apply Clindamycin solution 1x/day to "quiet affected areas" and 2x/day to flared affected areas    Take Spironolactone (aldactone) at 50 mg every day    Take Doxycycline 100mg 2x/day x 1 week  Take with food and not within 1 hour of lying down  Side effects: sun sensitivity, GI upset, esophageal erosion/ulceration (rare - take with plenty of water and do not lie down within 1 hour of dose)    Take Metformin ER at 500mg every night with dinner -- can decrease # and severity of lesions   SE: diarrhea    Take Turmeric 1000mg every day      CONSIDER:  Adding 3 small cans of V8 juice per week      RESOURCE:  [facility]      FOR FLARES (new painful bump):  Message office through [facility] for injection which will often hasten resolution of tender, red bump    Can apply warm/hot compresses on a painful, deep lump    If notice a foul-smelling drainage, can use Hydrogen Peroxide to cleanse area followed by application of Medi-honey to open area (apply Medi-honey " 1x/day)                Follow up in about 3 months (around 12/22/2023) for HS clinic.

## 2023-10-15 ENCOUNTER — PATIENT MESSAGE (OUTPATIENT)
Dept: DERMATOLOGY | Facility: CLINIC | Age: 53
End: 2023-10-15
Payer: COMMERCIAL

## 2023-10-19 ENCOUNTER — OFFICE VISIT (OUTPATIENT)
Dept: INTERNAL MEDICINE | Facility: CLINIC | Age: 53
End: 2023-10-19
Payer: COMMERCIAL

## 2023-10-19 ENCOUNTER — LAB VISIT (OUTPATIENT)
Dept: LAB | Facility: OTHER | Age: 53
End: 2023-10-19
Attending: STUDENT IN AN ORGANIZED HEALTH CARE EDUCATION/TRAINING PROGRAM
Payer: COMMERCIAL

## 2023-10-19 VITALS
WEIGHT: 229.25 LBS | DIASTOLIC BLOOD PRESSURE: 71 MMHG | SYSTOLIC BLOOD PRESSURE: 137 MMHG | HEART RATE: 71 BPM | BODY MASS INDEX: 42.19 KG/M2 | HEIGHT: 62 IN | OXYGEN SATURATION: 100 %

## 2023-10-19 DIAGNOSIS — L73.2 HIDRADENITIS SUPPURATIVA: Primary | ICD-10-CM

## 2023-10-19 DIAGNOSIS — F41.9 ANXIETY AND DEPRESSION: ICD-10-CM

## 2023-10-19 DIAGNOSIS — L73.2 HIDRADENITIS SUPPURATIVA: ICD-10-CM

## 2023-10-19 DIAGNOSIS — E66.01 CLASS 3 SEVERE OBESITY WITH BODY MASS INDEX (BMI) OF 40.0 TO 44.9 IN ADULT, UNSPECIFIED OBESITY TYPE, UNSPECIFIED WHETHER SERIOUS COMORBIDITY PRESENT: ICD-10-CM

## 2023-10-19 DIAGNOSIS — F32.A ANXIETY AND DEPRESSION: ICD-10-CM

## 2023-10-19 DIAGNOSIS — Z00.00 ANNUAL PHYSICAL EXAM: ICD-10-CM

## 2023-10-19 LAB
ALBUMIN SERPL BCP-MCNC: 4.3 G/DL (ref 3.5–5.2)
ALP SERPL-CCNC: 97 U/L (ref 55–135)
ALT SERPL W/O P-5'-P-CCNC: 22 U/L (ref 10–44)
ANION GAP SERPL CALC-SCNC: 11 MMOL/L (ref 8–16)
AST SERPL-CCNC: 22 U/L (ref 10–40)
BILIRUB SERPL-MCNC: 0.6 MG/DL (ref 0.1–1)
BUN SERPL-MCNC: 14 MG/DL (ref 6–20)
CALCIUM SERPL-MCNC: 10.8 MG/DL (ref 8.7–10.5)
CHLORIDE SERPL-SCNC: 102 MMOL/L (ref 95–110)
CO2 SERPL-SCNC: 26 MMOL/L (ref 23–29)
CREAT SERPL-MCNC: 0.8 MG/DL (ref 0.5–1.4)
EST. GFR  (NO RACE VARIABLE): >60 ML/MIN/1.73 M^2
GLUCOSE SERPL-MCNC: 84 MG/DL (ref 70–110)
POTASSIUM SERPL-SCNC: 4.3 MMOL/L (ref 3.5–5.1)
PROT SERPL-MCNC: 8.2 G/DL (ref 6–8.4)
SODIUM SERPL-SCNC: 139 MMOL/L (ref 136–145)

## 2023-10-19 PROCEDURE — 3075F SYST BP GE 130 - 139MM HG: CPT | Mod: CPTII,S$GLB,, | Performed by: STUDENT IN AN ORGANIZED HEALTH CARE EDUCATION/TRAINING PROGRAM

## 2023-10-19 PROCEDURE — 99999 PR PBB SHADOW E&M-EST. PATIENT-LVL III: CPT | Mod: PBBFAC,,, | Performed by: STUDENT IN AN ORGANIZED HEALTH CARE EDUCATION/TRAINING PROGRAM

## 2023-10-19 PROCEDURE — 3078F DIAST BP <80 MM HG: CPT | Mod: CPTII,S$GLB,, | Performed by: STUDENT IN AN ORGANIZED HEALTH CARE EDUCATION/TRAINING PROGRAM

## 2023-10-19 PROCEDURE — 83036 HEMOGLOBIN GLYCOSYLATED A1C: CPT | Performed by: STUDENT IN AN ORGANIZED HEALTH CARE EDUCATION/TRAINING PROGRAM

## 2023-10-19 PROCEDURE — 80061 LIPID PANEL: CPT | Performed by: STUDENT IN AN ORGANIZED HEALTH CARE EDUCATION/TRAINING PROGRAM

## 2023-10-19 PROCEDURE — 1159F PR MEDICATION LIST DOCUMENTED IN MEDICAL RECORD: ICD-10-PCS | Mod: CPTII,S$GLB,, | Performed by: STUDENT IN AN ORGANIZED HEALTH CARE EDUCATION/TRAINING PROGRAM

## 2023-10-19 PROCEDURE — 80053 COMPREHEN METABOLIC PANEL: CPT | Performed by: STUDENT IN AN ORGANIZED HEALTH CARE EDUCATION/TRAINING PROGRAM

## 2023-10-19 PROCEDURE — 99396 PR PREVENTIVE VISIT,EST,40-64: ICD-10-PCS | Mod: S$GLB,,, | Performed by: STUDENT IN AN ORGANIZED HEALTH CARE EDUCATION/TRAINING PROGRAM

## 2023-10-19 PROCEDURE — 3008F PR BODY MASS INDEX (BMI) DOCUMENTED: ICD-10-PCS | Mod: CPTII,S$GLB,, | Performed by: STUDENT IN AN ORGANIZED HEALTH CARE EDUCATION/TRAINING PROGRAM

## 2023-10-19 PROCEDURE — 3075F PR MOST RECENT SYSTOLIC BLOOD PRESS GE 130-139MM HG: ICD-10-PCS | Mod: CPTII,S$GLB,, | Performed by: STUDENT IN AN ORGANIZED HEALTH CARE EDUCATION/TRAINING PROGRAM

## 2023-10-19 PROCEDURE — 36415 COLL VENOUS BLD VENIPUNCTURE: CPT | Performed by: STUDENT IN AN ORGANIZED HEALTH CARE EDUCATION/TRAINING PROGRAM

## 2023-10-19 PROCEDURE — 1159F MED LIST DOCD IN RCRD: CPT | Mod: CPTII,S$GLB,, | Performed by: STUDENT IN AN ORGANIZED HEALTH CARE EDUCATION/TRAINING PROGRAM

## 2023-10-19 PROCEDURE — 99999 PR PBB SHADOW E&M-EST. PATIENT-LVL III: ICD-10-PCS | Mod: PBBFAC,,, | Performed by: STUDENT IN AN ORGANIZED HEALTH CARE EDUCATION/TRAINING PROGRAM

## 2023-10-19 PROCEDURE — 3008F BODY MASS INDEX DOCD: CPT | Mod: CPTII,S$GLB,, | Performed by: STUDENT IN AN ORGANIZED HEALTH CARE EDUCATION/TRAINING PROGRAM

## 2023-10-19 PROCEDURE — 99396 PREV VISIT EST AGE 40-64: CPT | Mod: S$GLB,,, | Performed by: STUDENT IN AN ORGANIZED HEALTH CARE EDUCATION/TRAINING PROGRAM

## 2023-10-19 PROCEDURE — 3078F PR MOST RECENT DIASTOLIC BLOOD PRESSURE < 80 MM HG: ICD-10-PCS | Mod: CPTII,S$GLB,, | Performed by: STUDENT IN AN ORGANIZED HEALTH CARE EDUCATION/TRAINING PROGRAM

## 2023-10-19 RX ORDER — MUPIROCIN 20 MG/G
OINTMENT TOPICAL
Qty: 15 G | Refills: 0 | Status: SHIPPED | OUTPATIENT
Start: 2023-10-19 | End: 2023-10-20 | Stop reason: SDUPTHER

## 2023-10-19 RX ORDER — FLUOXETINE HYDROCHLORIDE 20 MG/1
20 CAPSULE ORAL DAILY
Qty: 30 CAPSULE | Refills: 11 | Status: SHIPPED | OUTPATIENT
Start: 2023-10-19 | End: 2024-10-18

## 2023-10-19 NOTE — PROGRESS NOTES
[facility]    Subjective:         Patient ID: Kathrine Ashraf is a 53 y.o. female.    Chief Complaint: wellness visit and Arm Pain    History was obtained from the patient and supplemented through chart review.    HPI:  Patient is a 53 y.o. female who presents for a wellness visit.  She has a history of hidradenitis suppurativa as well as depression.  States Wellbutrin gives her strange thoughts so she discontinued this medicine.  Reports previous weight gain on Paxil.    Today she has a draining lesion in her left axilla.  She is followed by Dermatology for hidradenitis suppurativa.  She has had some trouble getting off work to make frequent doctor's appointments.     She has lost about 6 lb with diet and exercise.  She is interested in medications for weight loss.    Medical History  Past Medical History:   Diagnosis Date    Allergy     Anxiety              Surgical hx, family hx, social hx   Family History   Problem Relation Age of Onset    Hypertension Mother     Cancer Father     Breast cancer Maternal Aunt     Melanoma Neg Hx     Ovarian cancer Neg Hx     Colon cancer Neg Hx      Past Surgical History:   Procedure Laterality Date     SECTION      COLONOSCOPY N/A 2022    Procedure: COLONOSCOPY;  Surgeon: Franky Murcia MD;  Location: [address];  Service: Endoscopy;  Laterality: N/A;  -covid [facility]    HYSTERECTOMY      partial    TUBAL LIGATION       Social History     Socioeconomic History    Marital status:    Tobacco Use    Smoking status: Never    Smokeless tobacco: Never   Substance and Sexual Activity    Alcohol use: Yes     Comment: Socially    Drug use: Never    Sexual activity: Not Currently     Partners: Male     Birth control/protection: See Surgical Hx   Other Topics Concern    Are you pregnant or think you may be? No       Immunization History   Administered Date(s) Administered    COVID-19, MRNA, LN-S, PF (Pfizer)  "(Domínguez Cap) 2022    MMR 2001    PPD Test 2021    Tdap 2011       Current Outpatient Medications   Medication Instructions    busPIRone (BUSPAR) 10 MG tablet TAKE 1 TABLET(10 MG) BY MOUTH THREE TIMES DAILY AS NEEDED    clindamycin (CLEOCIN T) 1 % external solution AAA axilla bid    doxycycline (VIBRAMYCIN) 100 MG Cap Take 1 po bid with food and not within 1 hour prior to lying down    ergocalciferol (ERGOCALCIFEROL) 50,000 unit Cap Oral    estradioL (ESTRACE) 0.01 % (0.1 mg/gram) vaginal cream Apply 1-2g (pea-sized amount) vaginally every night for 2-4 weeks, followed by 2-3x/week    FLUoxetine 20 mg, Oral, Daily    gabapentin (NEURONTIN) 100 mg, Oral, Nightly    LIDOcaine-prilocaine (EMLA) cream SMARTSI Topical Daily PRN    metFORMIN (GLUCOPHAGE-XR) 500 MG ER 24hr tablet Take 1 po qday    methocarbamoL (ROBAXIN) 500 mg, Oral, Nightly PRN    multivitamin capsule 1 capsule, Oral, Daily    mupirocin (BACTROBAN) 2 % ointment Apply to armpit    spironolactone (ALDACTONE) 50 MG tablet Start with 1 po qday, increase to 2 po qday as tolerated         Objective:        Body mass index is 41.94 kg/m².  Vitals:    10/19/23 1559   BP: 137/71   Pulse: 71   SpO2: 100%   Weight: 104 kg (229 lb 4.5 oz)   Height: 5' 2" (1.575 m)   PainSc:   5   PainLoc: Arm     Physical Exam  Constitutional:       General: She is not in acute distress.     Appearance: She is obese. She is not ill-appearing.   Cardiovascular:      Rate and Rhythm: Normal rate.      Heart sounds: No murmur heard.  Pulmonary:      Effort: Pulmonary effort is normal. No respiratory distress.   Abdominal:      General: Abdomen is flat.   Musculoskeletal:      Right lower leg: No edema.      Left lower leg: No edema.   Skin:     General: Skin is warm and dry.   Neurological:      General: No focal deficit present.      Mental Status: She is alert.   Psychiatric:         Mood and Affect: Mood normal.           Lab Results   Component Value Date    " WBC 5.05 12/02/2022    HGB 14.0 12/02/2022    HCT 43.3 12/02/2022     12/02/2022    CHOL 283 (H) 12/02/2022    TRIG 70 12/02/2022     (H) 12/02/2022    ALT 62 (H) 12/02/2022    AST 37 12/02/2022     12/02/2022    K 4.4 12/02/2022     12/02/2022    CREATININE 0.8 12/02/2022    BUN 10 12/02/2022    CO2 26 12/02/2022    TSH 0.92 04/21/2022    HGBA1C 5.4 04/21/2022       The ASCVD Risk score (Chance HONEYCUTT, et al., 2019) failed to calculate for the following reasons:    The valid HDL cholesterol range is 20 to 100 mg/dL    Assessment:         1. Hidradenitis suppurativa    2. Anxiety and depression    3. Annual physical exam    4. Class 3 severe obesity with body mass index (BMI) of 40.0 to 44.9 in adult, unspecified obesity type, unspecified whether serious comorbidity present          Plan:     1. Hidradenitis suppurativa  - has draining lesion in her left axilla.  Recently complete a course of doxycycline.  Advised continued follow-up with dermatology.    - patient requested mupirocin ointment, discussed this is unlikely to resolve underlying problem but reasonable to minimize risk of infection and symptoms.  will prescribe  - mupirocin (BACTROBAN) 2 % ointment; Apply to armpit  Dispense: 15 g; Refill: 0  - LIPID PANEL; Future  - Hemoglobin A1C; Future  - COMPREHENSIVE METABOLIC PANEL; Future    2. Anxiety and depression  - reviewed side effects of common SSRIs.  Fluoxetine does not have side effect of weight gain therefore we will try this medicine.  Patient has follow up with her PCP in about 2 weeks at which time she can assess response.  - FLUoxetine 20 MG capsule; Take 1 capsule (20 mg total) by mouth once daily.  Dispense: 30 capsule; Refill: 11    3. Annual physical exam  - recheck lipids after weight loss and starting metformin.  May need statin.  - LIPID PANEL; Future  - Hemoglobin A1C; Future    4. Class 3 severe obesity with body mass index (BMI) of 40.0 to 44.9 in adult, unspecified  obesity type, unspecified whether serious comorbidity present  - patient to discuss GLP 1 agonist with PCP.  Discussed potential cost of these medicines today if they are not covered.    All of your core healthy metrics are met.    No follow-ups on file. or sooner natalin        Lucas Otoole  [facility]  [address] [address]  Phone [phone]  Fax [phone]    This note is dictated using the M*Modal Fluency Direct word recognition program. It may contain word recognition mistakes or wrong word substitutions that were missed on review.

## 2023-10-20 DIAGNOSIS — L73.2 HIDRADENITIS SUPPURATIVA: ICD-10-CM

## 2023-10-20 LAB
CHOLEST SERPL-MCNC: 212 MG/DL (ref 120–199)
CHOLEST/HDLC SERPL: 2.6 {RATIO} (ref 2–5)
ESTIMATED AVG GLUCOSE: 105 MG/DL (ref 68–131)
HBA1C MFR BLD: 5.3 % (ref 4–5.6)
HDLC SERPL-MCNC: 83 MG/DL (ref 40–75)
HDLC SERPL: 39.2 % (ref 20–50)
LDLC SERPL CALC-MCNC: 117.2 MG/DL (ref 63–159)
NONHDLC SERPL-MCNC: 129 MG/DL
TRIGL SERPL-MCNC: 59 MG/DL (ref 30–150)

## 2023-10-20 NOTE — TELEPHONE ENCOUNTER
Refill Routing Note   Medication(s) are not appropriate for processing by [facility] for the following reason(s):      Medication outside of protocol    [facility] action(s):  Route Care Due:  None identified            Appointments  past 12m or future 3m with PCP    Date Provider   Last Visit   6/5/2023 Naomi Franklin MD   Next Visit   11/6/2023 Naomi Franklin MD   ED visits in past 90 days: 0        Note composed:1:11 PM 10/20/2023

## 2023-10-20 NOTE — TELEPHONE ENCOUNTER
----- Message from Griselda Jimenes sent at 10/20/2023 10:59 AM CDT -----  Type:  Pharmacy Calling to Clarify an RX      Name of Caller:  Nita       Pharmacy Name:  [facility]       Prescription Name:  mupirocin (BACTROBAN) 2 % ointment      What do they need to clarify?  Directions - frequency       Can you be contacted via [facility]? Call back       Best Call Back Number:  [phone]      Additional Information:

## 2023-10-23 RX ORDER — MUPIROCIN 20 MG/G
OINTMENT TOPICAL
Qty: 15 G | Refills: 0 | Status: SHIPPED | OUTPATIENT
Start: 2023-10-23 | End: 2023-10-26 | Stop reason: SDUPTHER

## 2023-12-11 ENCOUNTER — TELEPHONE (OUTPATIENT)
Dept: ADMINISTRATIVE | Facility: HOSPITAL | Age: 53
End: 2023-12-11
Payer: COMMERCIAL

## 2023-12-12 ENCOUNTER — OFFICE VISIT (OUTPATIENT)
Dept: DERMATOLOGY | Facility: CLINIC | Age: 53
End: 2023-12-12
Payer: COMMERCIAL

## 2023-12-12 DIAGNOSIS — L73.2 HIDRADENITIS SUPPURATIVA: Primary | ICD-10-CM

## 2023-12-12 PROCEDURE — 1160F RVW MEDS BY RX/DR IN RCRD: CPT | Mod: CPTII,S$GLB,, | Performed by: DERMATOLOGY

## 2023-12-12 PROCEDURE — 1160F PR REVIEW ALL MEDS BY PRESCRIBER/CLIN PHARMACIST DOCUMENTED: ICD-10-PCS | Mod: CPTII,S$GLB,, | Performed by: DERMATOLOGY

## 2023-12-12 PROCEDURE — 99214 OFFICE O/P EST MOD 30 MIN: CPT | Mod: 25,S$GLB,, | Performed by: DERMATOLOGY

## 2023-12-12 PROCEDURE — 1159F PR MEDICATION LIST DOCUMENTED IN MEDICAL RECORD: ICD-10-PCS | Mod: CPTII,S$GLB,, | Performed by: DERMATOLOGY

## 2023-12-12 PROCEDURE — 99214 PR OFFICE/OUTPT VISIT, EST, LEVL IV, 30-39 MIN: ICD-10-PCS | Mod: 25,S$GLB,, | Performed by: DERMATOLOGY

## 2023-12-12 PROCEDURE — 99999 PR PBB SHADOW E&M-EST. PATIENT-LVL III: ICD-10-PCS | Mod: PBBFAC,,, | Performed by: DERMATOLOGY

## 2023-12-12 PROCEDURE — 3044F PR MOST RECENT HEMOGLOBIN A1C LEVEL <7.0%: ICD-10-PCS | Mod: CPTII,S$GLB,, | Performed by: DERMATOLOGY

## 2023-12-12 PROCEDURE — 3044F HG A1C LEVEL LT 7.0%: CPT | Mod: CPTII,S$GLB,, | Performed by: DERMATOLOGY

## 2023-12-12 PROCEDURE — 11900 PR INJECTION INTO SKIN LESIONS, UP TO 7: ICD-10-PCS | Mod: S$GLB,,, | Performed by: DERMATOLOGY

## 2023-12-12 PROCEDURE — 1159F MED LIST DOCD IN RCRD: CPT | Mod: CPTII,S$GLB,, | Performed by: DERMATOLOGY

## 2023-12-12 PROCEDURE — 99999 PR PBB SHADOW E&M-EST. PATIENT-LVL III: CPT | Mod: PBBFAC,,, | Performed by: DERMATOLOGY

## 2023-12-12 PROCEDURE — 11900 INJECT SKIN LESIONS </W 7: CPT | Mod: S$GLB,,, | Performed by: DERMATOLOGY

## 2023-12-12 RX ORDER — MOMETASONE FUROATE 1 MG/G
CREAM TOPICAL
Qty: 60 G | Refills: 2 | Status: SHIPPED | OUTPATIENT
Start: 2023-12-12

## 2023-12-12 RX ORDER — TRIAMCINOLONE ACETONIDE 40 MG/ML
40 INJECTION, SUSPENSION INTRA-ARTICULAR; INTRAMUSCULAR ONCE
Status: SHIPPED | OUTPATIENT
Start: 2023-12-12

## 2023-12-12 NOTE — ASSESSMENT & PLAN NOTE
Today's Plan:         Subjective:      Patient ID:  Kathrine Ashraf is a 53 y.o. female who presents for        Chief Complaint   Patient presents with    Hidradenitis Suppurativa       1st visit      This is patient's 1st visit to [facility] for Hidradenitis Suppurativa.      Year started: 2003 - started in 30s  Location started: axilla  Family history of HS: children, father  Smokes: none  Current weight: 230# (stable)     Last seen 8/29/2023 by Dr. Cortez for same.      Past treatments (including topicals, orals, injectables, laser, surgery):  I&D  Antibiotic ointment     Current treatments:   Spironolactone 50 mg 1x/day (Pt took twice - stopped due to diarrhea)  Clindamycin soln 2x/day  Dial soap daily     In the past, pt has been diagnosed with:     PCOS: none  Diabetes: none  HTN: none  Arthritis: OA  Cancer: none     Pt c/o new lesion on left axilla x 1 week. Tender and draining+           Review of Systems   Constitutional:  Positive for weight gain (10# in past year (230#) - but fluctuates). Negative for weight loss.   Gastrointestinal:  Positive for diarrhea (started due to spironolactone (stopped)).   Genitourinary:  Negative for irregular periods (hysterectomy - 2012).   Musculoskeletal:  Positive for arthralgias (knees).   Skin:  Positive for abscesses (left axilla).   Hematologic/Lymphatic: Bruises/bleeds easily.         Objective:   Physical Exam   Constitutional: She appears well-developed and well-nourished. No distress.   Neurological: She is alert and oriented to person, place, and time. She is not disoriented.   Psychiatric: She has a normal mood and affect.   Skin:   Areas Examined (abnormalities noted in diagram):   Chest / Axilla Inspection Performed             Diagram Legend     Erythematous scaling macule/papule c/w actinic keratosis       Vascular papule c/w angioma      Pigmented verrucoid papule/plaque c/w seborrheic keratosis      Yellow umbilicated papule c/w sebaceous hyperplasia       "Irregularly shaped tan macule c/w lentigo     1-2 mm smooth white papules consistent with Milia      Movable subcutaneous cyst with punctum c/w epidermal inclusion cyst      Subcutaneous movable cyst c/w pilar cyst      Firm pink to brown papule c/w dermatofibroma      Pedunculated fleshy papule(s) c/w skin tag(s)      Evenly pigmented macule c/w junctional nevus     Mildly variegated pigmented, slightly irregular-bordered macule c/w mildly atypical nevus      Flesh colored to evenly pigmented papule c/w intradermal nevus       Pink pearly papule/plaque c/w basal cell carcinoma      Erythematous hyperkeratotic cursted plaque c/w SCC      Surgical scar with no sign of skin cancer recurrence      Open and closed comedones      Inflammatory papules and pustules      Verrucoid papule consistent consistent with wart     Erythematous eczematous patches and plaques     Dystrophic onycholytic nail with subungual debris c/w onychomycosis     Umbilicated papule    Erythematous-base heme-crusted tan verrucoid plaque consistent with inflamed seborrheic keratosis     Erythematous Silvery Scaling Plaque c/w Psoriasis     See annotation        Assessment / Plan:         Hidradenitis suppurativa  -     triamcinolone acetonide injection 40 mg  -     CT [id number] ACETONIDE INJ PER 10MG  -     CT INJECTION INTO SKIN LESIONS, UP TO 7  -     metFORMIN (GLUCOPHAGE-XR) 500 MG ER 24hr tablet; Take 1 po qday  Dispense: 30 tablet; Refill: 3     Intralesional Kenalog 20mg/cc (0.5 cc total) injected into 1 lesions on the left axilla today after obtaining verbal consent including risk of surrounding hypopigmentation. Patient tolerated procedure well.     Units:2  NDC for Kenalog 40mg/cc:  4940-5889-16         Hidradenitis suppurativa  Today's Plan:       LIFESTYLE:     Weight loss and dietary modifications - Obesity is associated with more severe HS:  Avoid sugars and processed foods  Limit "white" foods ie. Bread, rice, pasta, potatoes  - " "recommend limiting to 1/2 cup per serving  Limit dairy      Keep skin cool as overheating can flare HS     Avoid shaving affected areas and wearing tight fitting clothing as friction exacerbates disease. Friction and mechanical stress acts as a trigger for HS flare.      TREATMENT:     FOR FLARES    Dilute bleach to affected areas: compresses daily to affected/flared area(s) - can use CLn wash on warm wet rag as compress  If have groin involvement, may want to take dilute bleach baths (daily when flared; 2x/week for maintenance). Recipe for dilute bleach bath:     add 1/2 cup of regular strength (6%) bleach to a full tub of lukewarm water and soak for 10 - 15 minutes. (use 1/4 cup for a half-full tub of water)   OR Add Clorox (2 teaspoons/gal of water, max. 2/3 cup) to bath water     Mometasone cream to flared area under bandaid x 12 hours every day     EVERYDAY:  Wash affected areas with dilute bleach every day     Apply Clindamycin solution 1x/day to "quiet affected areas" and 2x/day to flared affected areas     Take Metformin ER at 500mg every night with dinner -- can decrease # and severity of lesions              SE: diarrhea     Take Turmeric 1000mg every day        CONSIDER:  Adding 3 small cans of V8 juice per week        RESOURCE:  [email or web address]    Schedule for deroofing of left lateral axilla     "

## 2023-12-12 NOTE — PROGRESS NOTES
"  Subjective:      Patient ID:  Kathrine Ashraf is a 53 y.o. female who presents for   Chief Complaint   Patient presents with    Hidradenitis Suppurativa     F/u     Patient with Hidradenitis suppurativa. Last seen on 9/22/2023 for IL K 20 to L axilla abscesses     Condition overall stable      C/o new lesion(s) L axilla - location. Present x 1yr "dull pain"     Current treatment regimen:  Dilute bleach compresses weekly to 2 - 3x/week  Wash daily with dilute bleach (Cln wash)  Clindamycin solution 2x daily  Metformin ER at 500mg every day  Turmeric 1000mg every day   V8 juice    Doxycycline 100mg 2x/day x 1 week - completed     No longer taking:  Spironolactone - takes only when "really needs it" causes leg pain. States last does was between October, early November 2023      Lifestyle modifications - diet, smoking, shaving etc  Avoid sugars and processed foods  Limit "white" foods ie. Bread, rice, pasta, potatoes    Limit dairy          Review of Systems   Constitutional:  Positive for weight loss (220# - lost 15 lbs). Negative for weight gain.   Gastrointestinal:  Negative for diarrhea (started due to spironolactone (stopped)).   Genitourinary:  Negative for irregular periods (hysterectomy - 2012).   Musculoskeletal:  Positive for arthralgias (knees).   Skin:  Positive for abscesses (left axilla).   Hematologic/Lymphatic: Bruises/bleeds easily.       Objective:   Physical Exam   Constitutional: She appears well-developed and well-nourished. No distress.   Neurological: She is alert and oriented to person, place, and time. She is not disoriented.   Psychiatric: She has a normal mood and affect.   Skin:   Areas Examined (abnormalities noted in diagram):   Chest / Axilla Inspection Performed           Diagram Legend     Erythematous scaling macule/papule c/w actinic keratosis       Vascular papule c/w angioma      Pigmented verrucoid papule/plaque c/w seborrheic keratosis      Yellow umbilicated papule c/w sebaceous " hyperplasia      Irregularly shaped tan macule c/w lentigo     1-2 mm smooth white papules consistent with Milia      Movable subcutaneous cyst with punctum c/w epidermal inclusion cyst      Subcutaneous movable cyst c/w pilar cyst      Firm pink to brown papule c/w dermatofibroma      Pedunculated fleshy papule(s) c/w skin tag(s)      Evenly pigmented macule c/w junctional nevus     Mildly variegated pigmented, slightly irregular-bordered macule c/w mildly atypical nevus      Flesh colored to evenly pigmented papule c/w intradermal nevus       Pink pearly papule/plaque c/w basal cell carcinoma      Erythematous hyperkeratotic cursted plaque c/w SCC      Surgical scar with no sign of skin cancer recurrence      Open and closed comedones      Inflammatory papules and pustules      Verrucoid papule consistent consistent with wart     Erythematous eczematous patches and plaques     Dystrophic onycholytic nail with subungual debris c/w onychomycosis     Umbilicated papule    Erythematous-base heme-crusted tan verrucoid plaque consistent with inflamed seborrheic keratosis     Erythematous Silvery Scaling Plaque c/w Psoriasis     See annotation      Assessment / Plan:        Hidradenitis suppurativa  -     triamcinolone acetonide injection 40 mg  -     [place] [id number] ACETONIDE INJ PER 10MG  -     [place] INJECTION INTO SKIN LESIONS, UP TO 7  -     mometasone 0.1% (ELOCON) 0.1 % cream; AAA every day when red and tender under occlusion  Dispense: 60 g; Refill: 2      Hidradenitis suppurativa  Today's Plan:         Subjective:      Patient ID:  Kathrine Ashraf is a 53 y.o. female who presents for        Chief Complaint   Patient presents with    Hidradenitis Suppurativa       1st visit      This is patient's 1st visit to [facility] for Hidradenitis Suppurativa.      Year started: 2003 - started in 30s  Location started: axilla  Family history of HS: children, father  Smokes: none  Current weight: 230# (stable)     Last seen 8/29/2023  by Dr. Cortez for same.      Past treatments (including topicals, orals, injectables, laser, surgery):  I&D  Antibiotic ointment     Current treatments:   Spironolactone 50 mg 1x/day (Pt took twice - stopped due to diarrhea)  Clindamycin soln 2x/day  Dial soap daily     In the past, pt has been diagnosed with:     PCOS: none  Diabetes: none  HTN: none  Arthritis: OA  Cancer: none     Pt c/o new lesion on left axilla x 1 week. Tender and draining+           Review of Systems   Constitutional:  Positive for weight gain (10# in past year (230#) - but fluctuates). Negative for weight loss.   Gastrointestinal:  Positive for diarrhea (started due to spironolactone (stopped)).   Genitourinary:  Negative for irregular periods (hysterectomy - 2012).   Musculoskeletal:  Positive for arthralgias (knees).   Skin:  Positive for abscesses (left axilla).   Hematologic/Lymphatic: Bruises/bleeds easily.         Objective:   Physical Exam   Constitutional: She appears well-developed and well-nourished. No distress.   Neurological: She is alert and oriented to person, place, and time. She is not disoriented.   Psychiatric: She has a normal mood and affect.   Skin:   Areas Examined (abnormalities noted in diagram):   Chest / Axilla Inspection Performed             Diagram Legend     Erythematous scaling macule/papule c/w actinic keratosis       Vascular papule c/w angioma      Pigmented verrucoid papule/plaque c/w seborrheic keratosis      Yellow umbilicated papule c/w sebaceous hyperplasia      Irregularly shaped tan macule c/w lentigo     1-2 mm smooth white papules consistent with Milia      Movable subcutaneous cyst with punctum c/w epidermal inclusion cyst      Subcutaneous movable cyst c/w pilar cyst      Firm pink to brown papule c/w dermatofibroma      Pedunculated fleshy papule(s) c/w skin tag(s)      Evenly pigmented macule c/w junctional nevus     Mildly variegated pigmented, slightly irregular-bordered macule c/w mildly  "atypical nevus      Flesh colored to evenly pigmented papule c/w intradermal nevus       Pink pearly papule/plaque c/w basal cell carcinoma      Erythematous hyperkeratotic cursted plaque c/w SCC      Surgical scar with no sign of skin cancer recurrence      Open and closed comedones      Inflammatory papules and pustules      Verrucoid papule consistent consistent with wart     Erythematous eczematous patches and plaques     Dystrophic onycholytic nail with subungual debris c/w onychomycosis     Umbilicated papule    Erythematous-base heme-crusted tan verrucoid plaque consistent with inflamed seborrheic keratosis     Erythematous Silvery Scaling Plaque c/w Psoriasis     See annotation        Assessment / Plan:         Hidradenitis suppurativa  -     triamcinolone acetonide injection 40 mg  -     WI [id number] ACETONIDE INJ PER 10MG  -     WI INJECTION INTO SKIN LESIONS, UP TO 7  -     metFORMIN (GLUCOPHAGE-XR) 500 MG ER 24hr tablet; Take 1 po qday  Dispense: 30 tablet; Refill: 3     Intralesional Kenalog 20mg/cc (0.5 cc total) injected into 1 lesions on the left axilla today after obtaining verbal consent including risk of surrounding hypopigmentation. Patient tolerated procedure well.     Units:2  NDC for Kenalog 40mg/cc:  5473-1109-89         Hidradenitis suppurativa  Today's Plan:       LIFESTYLE:     Weight loss and dietary modifications - Obesity is associated with more severe HS:  Avoid sugars and processed foods  Limit "white" foods ie. Bread, rice, pasta, potatoes  - recommend limiting to 1/2 cup per serving  Limit dairy      Keep skin cool as overheating can flare HS     Avoid shaving affected areas and wearing tight fitting clothing as friction exacerbates disease. Friction and mechanical stress acts as a trigger for HS flare.      TREATMENT:     FOR FLARES    Dilute bleach to affected areas: compresses daily to affected/flared area(s) - can use CLn wash on warm wet rag as compress  If have groin " "involvement, may want to take dilute bleach baths (daily when flared; 2x/week for maintenance). Recipe for dilute bleach bath:     add 1/2 cup of regular strength (6%) bleach to a full tub of lukewarm water and soak for 10 - 15 minutes. (use 1/4 cup for a half-full tub of water)   OR Add Clorox (2 teaspoons/gal of water, max. 2/3 cup) to bath water     Mometasone cream to flared area under bandaid x 12 hours every day     EVERYDAY:  Wash affected areas with dilute bleach every day     Apply Clindamycin solution 1x/day to "quiet affected areas" and 2x/day to flared affected areas     Take Metformin ER at 500mg every night with dinner -- can decrease # and severity of lesions              SE: diarrhea     Take Turmeric 1000mg every day        CONSIDER:  Adding 3 small cans of V8 juice per week        RESOURCE:  -[email or web address]    Schedule for deroofing of left lateral axilla       No follow-ups on file.  "

## 2023-12-12 NOTE — PATIENT INSTRUCTIONS
Subjective:      Patient ID:  Kathrine Ashraf is a 53 y.o. female who presents for        Chief Complaint   Patient presents with    Hidradenitis Suppurativa       1st visit      This is patient's 1st visit to [facility] for Hidradenitis Suppurativa.      Year started: 2003 - started in 30s  Location started: axilla  Family history of HS: children, father  Smokes: none  Current weight: 230# (stable)     Last seen 8/29/2023 by Dr. Cortez for same.      Past treatments (including topicals, orals, injectables, laser, surgery):  I&D  Antibiotic ointment     Current treatments:   Spironolactone 50 mg 1x/day (Pt took twice - stopped due to diarrhea)  Clindamycin soln 2x/day  Dial soap daily     In the past, pt has been diagnosed with:     PCOS: none  Diabetes: none  HTN: none  Arthritis: OA  Cancer: none     Pt c/o new lesion on left axilla x 1 week. Tender and draining+           Review of Systems   Constitutional:  Positive for weight gain (10# in past year (230#) - but fluctuates). Negative for weight loss.   Gastrointestinal:  Positive for diarrhea (started due to spironolactone (stopped)).   Genitourinary:  Negative for irregular periods (hysterectomy - 2012).   Musculoskeletal:  Positive for arthralgias (knees).   Skin:  Positive for abscesses (left axilla).   Hematologic/Lymphatic: Bruises/bleeds easily.         Objective:   Physical Exam   Constitutional: She appears well-developed and well-nourished. No distress.   Neurological: She is alert and oriented to person, place, and time. She is not disoriented.   Psychiatric: She has a normal mood and affect.   Skin:   Areas Examined (abnormalities noted in diagram):   Chest / Axilla Inspection Performed             Diagram Legend     Erythematous scaling macule/papule c/w actinic keratosis       Vascular papule c/w angioma      Pigmented verrucoid papule/plaque c/w seborrheic keratosis      Yellow umbilicated papule c/w sebaceous hyperplasia      Irregularly shaped  "tan macule c/w lentigo     1-2 mm smooth white papules consistent with Milia      Movable subcutaneous cyst with punctum c/w epidermal inclusion cyst      Subcutaneous movable cyst c/w pilar cyst      Firm pink to brown papule c/w dermatofibroma      Pedunculated fleshy papule(s) c/w skin tag(s)      Evenly pigmented macule c/w junctional nevus     Mildly variegated pigmented, slightly irregular-bordered macule c/w mildly atypical nevus      Flesh colored to evenly pigmented papule c/w intradermal nevus       Pink pearly papule/plaque c/w basal cell carcinoma      Erythematous hyperkeratotic cursted plaque c/w SCC      Surgical scar with no sign of skin cancer recurrence      Open and closed comedones      Inflammatory papules and pustules      Verrucoid papule consistent consistent with wart     Erythematous eczematous patches and plaques     Dystrophic onycholytic nail with subungual debris c/w onychomycosis     Umbilicated papule    Erythematous-base heme-crusted tan verrucoid plaque consistent with inflamed seborrheic keratosis     Erythematous Silvery Scaling Plaque c/w Psoriasis     See annotation        Assessment / Plan:         Hidradenitis suppurativa  -     triamcinolone acetonide injection 40 mg  -     KY [id number] ACETONIDE INJ PER 10MG  -     [place] INJECTION INTO SKIN LESIONS, UP TO 7  -     doxycycline (VIBRAMYCIN) 100 MG Cap; Take 1 po bid with food and not within 1 hour prior to lying down  Dispense: 14 capsule; Refill: 0  -     metFORMIN (GLUCOPHAGE-XR) 500 MG ER 24hr tablet; Take 1 po qday  Dispense: 30 tablet; Refill: 3                 Hidradenitis suppurativa  Today's Plan:       LIFESTYLE:     Weight loss and dietary modifications - Obesity is associated with more severe HS:  Avoid sugars and processed foods  Limit "white" foods ie. Bread, rice, pasta, potatoes  - recommend limiting to 1/2 cup per serving  Limit dairy      Keep skin cool as overheating can flare HS     Avoid shaving affected " "areas and wearing tight fitting clothing as friction exacerbates disease. Friction and mechanical stress acts as a trigger for HS flare.      TREATMENT:     FOR FLARES    Dilute bleach to affected areas: compresses daily to affected/flared area(s) - can use CLn wash on warm wet rag as compress  If have groin involvement, may want to take dilute bleach baths (daily when flared; 2x/week for maintenance). Recipe for dilute bleach bath:     add 1/2 cup of regular strength (6%) bleach to a full tub of lukewarm water and soak for 10 - 15 minutes. (use 1/4 cup for a half-full tub of water)   OR Add Clorox (2 teaspoons/gal of water, max. 2/3 cup) to bath water     Mometasone cream to flared area under bandaid x 12 hours every day     EVERYDAY:  Wash affected areas with dilute bleach every day     Apply Clindamycin solution 1x/day to "quiet affected areas" and 2x/day to flared affected areas     Take Metformin ER at 500mg every night with dinner -- can decrease # and severity of lesions              SE: diarrhea     Take Turmeric 1000mg every day        CONSIDER:  Adding 3 small cans of V8 juice per week        RESOURCE:  -[email or web address]    Schedule for deroofing of left lateral axilla     "

## 2024-01-30 ENCOUNTER — OFFICE VISIT (OUTPATIENT)
Dept: PRIMARY CARE CLINIC | Facility: CLINIC | Age: 54
End: 2024-01-30
Payer: COMMERCIAL

## 2024-01-30 VITALS
OXYGEN SATURATION: 99 % | DIASTOLIC BLOOD PRESSURE: 72 MMHG | TEMPERATURE: 98 F | HEART RATE: 80 BPM | WEIGHT: 224.19 LBS | SYSTOLIC BLOOD PRESSURE: 128 MMHG | RESPIRATION RATE: 18 BRPM | BODY MASS INDEX: 41.26 KG/M2 | HEIGHT: 62 IN

## 2024-01-30 DIAGNOSIS — E66.01 MORBID OBESITY DUE TO EXCESS CALORIES: ICD-10-CM

## 2024-01-30 DIAGNOSIS — K52.9 ACUTE GASTROENTERITIS: Primary | ICD-10-CM

## 2024-01-30 PROCEDURE — 99214 OFFICE O/P EST MOD 30 MIN: CPT | Mod: S$GLB,,, | Performed by: FAMILY MEDICINE

## 2024-01-30 PROCEDURE — 3008F BODY MASS INDEX DOCD: CPT | Mod: CPTII,S$GLB,, | Performed by: FAMILY MEDICINE

## 2024-01-30 PROCEDURE — 99999 PR PBB SHADOW E&M-EST. PATIENT-LVL V: CPT | Mod: PBBFAC,,, | Performed by: FAMILY MEDICINE

## 2024-01-30 PROCEDURE — 3078F DIAST BP <80 MM HG: CPT | Mod: CPTII,S$GLB,, | Performed by: FAMILY MEDICINE

## 2024-01-30 PROCEDURE — 3074F SYST BP LT 130 MM HG: CPT | Mod: CPTII,S$GLB,, | Performed by: FAMILY MEDICINE

## 2024-01-30 PROCEDURE — 1160F RVW MEDS BY RX/DR IN RCRD: CPT | Mod: CPTII,S$GLB,, | Performed by: FAMILY MEDICINE

## 2024-01-30 PROCEDURE — 1159F MED LIST DOCD IN RCRD: CPT | Mod: CPTII,S$GLB,, | Performed by: FAMILY MEDICINE

## 2024-01-30 RX ORDER — ONDANSETRON 8 MG/1
8 TABLET, ORALLY DISINTEGRATING ORAL EVERY 6 HOURS PRN
Qty: 12 TABLET | Refills: 0 | Status: SHIPPED | OUTPATIENT
Start: 2024-01-30

## 2024-01-30 RX ORDER — TIZANIDINE 4 MG/1
4 TABLET ORAL 2 TIMES DAILY
COMMUNITY
Start: 2024-01-30

## 2024-01-30 NOTE — PATIENT INSTRUCTIONS
Gastroenteritis--Hydrate with clear liquids. (Dilute Powerade, Gatorade, water, flat Sprite). No carbonation (increases nausea). When hungry, slowly advance diet to full liquids and then to cold, bland diet. Zofran Rx as directed. If emesis starts and can't be resolved with Zofran, go to ER.   If diarrhea continues, I recommend good, soft toilet paper or baby wipes. Consider using Desitin or Jose Juan's Butt Paste. Wash hands thoroughly. I don't especially recommend Imodium or Pepto-Bismol; let the diarrhea take its course. You will not become dehydrated as long as we stop the vomiting.

## 2024-01-30 NOTE — PROGRESS NOTES
"    /72 (BP Location: Left arm, Patient Position: Sitting, BP Method: Medium (Manual))   Pulse 80   Temp 98 °F (36.7 °C)   Resp 18   Ht 5' 2" (1.575 m)   Wt 101.7 kg (224 lb 3.3 oz)   LMP 2012   SpO2 99%   BMI 41.01 kg/m²       ===========    Chief Complaint: No chief complaint on file.          HPI    Kathrine Ashraf is a 53 y.o. female     here for    4 day ho nausea and diarrhea. No emesis. No hematochezia or melena.    Had chills a couple days ago and subjective fevers but none recently.    Hydrating well.  Symptoms are slowly improving.  She would like to return to work on  and needed a physician evaluation because a ongoing symptoms and also for return to work if she is able.      Patient queried and denies any further complaints      Patient Active Problem List   Diagnosis    Attention deficit disorder (ADD) in adult    Anxiety and depression    Chronic headache disorder    Impingement syndrome of left shoulder    Sciatica of left side    Snoring    Obstructive sleep apnea syndrome    Leg weakness    Hidradenitis suppurativa    Morbid obesity due to excess calories       SURGICAL AND MEDICAL HISTORY: updated and reviewed.  Past Surgical History:   Procedure Laterality Date     SECTION      COLONOSCOPY N/A 2022    Procedure: COLONOSCOPY;  Surgeon: Franky Murcia MD;  Location: [address];  Service: Endoscopy;  Laterality: N/A;  [facility]    HYSTERECTOMY      partial    TUBAL LIGATION       ALLERGIES updated and reviewed.  Review of patient's allergies indicates:   Allergen Reactions    Cyanocobalamin (vitamin b-12) Rash    Penicillins Anaphylaxis    Sulfamethoxazole-trimethoprim     Sulfa (sulfonamide antibiotics) Rash       CURRENT OUTPATIENT MEDICATIONS updated and reviewed    Current Outpatient Medications:     busPIRone (BUSPAR) 10 MG tablet, TAKE 1 TABLET(10 MG) BY MOUTH THREE TIMES DAILY AS NEEDED, Disp: 90 tablet, Rfl: " 5    clindamycin (CLEOCIN T) 1 % external solution, AAA axilla bid, Disp: 60 mL, Rfl: 3    ergocalciferol (ERGOCALCIFEROL) 50,000 unit Cap, Take by mouth., Disp: , Rfl:     estradioL (ESTRACE) 0.01 % (0.1 mg/gram) vaginal cream, Apply 1-2g (pea-sized amount) vaginally every night for 2-4 weeks, followed by 2-3x/week, Disp: 42.5 g, Rfl: 12    FLUoxetine 20 MG capsule, Take 1 capsule (20 mg total) by mouth once daily., Disp: 30 capsule, Rfl: 11    metFORMIN (GLUCOPHAGE-XR) 500 MG ER 24hr tablet, Take 1 po qday, Disp: 30 tablet, Rfl: 3    mometasone 0.1% (ELOCON) 0.1 % cream, AAA every day when red and tender under occlusion, Disp: 60 g, Rfl: 2    multivitamin capsule, Take 1 capsule by mouth once daily., Disp: , Rfl:     mupirocin (BACTROBAN) 2 % ointment, Apply to armpit twice daily, Disp: 15 g, Rfl: 0    spironolactone (ALDACTONE) 50 MG tablet, Start with 1 po qday, increase to 2 po qday as tolerated, Disp: 60 tablet, Rfl: 3    tiZANidine (ZANAFLEX) 4 MG tablet, Take 4 mg by mouth 2 (two) times daily., Disp: , Rfl:     doxycycline (VIBRAMYCIN) 100 MG Cap, Take 1 po bid with food and not within 1 hour prior to lying down (Patient not taking: Reported on 10/19/2023), Disp: 14 capsule, Rfl: 0    gabapentin (NEURONTIN) 100 MG capsule, Take 1 capsule (100 mg total) by mouth every evening. (Patient not taking: Reported on 10/19/2023), Disp: 30 capsule, Rfl: 3    LIDOcaine-prilocaine (EMLA) cream, SMARTSI Topical Daily PRN, Disp: , Rfl:     methocarbamoL (ROBAXIN) 500 MG Tab, Take 500 mg by mouth nightly as needed., Disp: , Rfl:     ondansetron (ZOFRAN-ODT) 8 MG TbDL, Take 1 tablet (8 mg total) by mouth every 6 (six) hours as needed., Disp: 12 tablet, Rfl: 0    Current Facility-Administered Medications:     triamcinolone acetonide injection 40 mg, 40 mg, Intradermal, Once, Natalie Cortez MD    Review of Systems   Constitutional:  Negative for activity change, appetite change, chills, diaphoresis, fatigue, fever and  "unexpected weight change.   HENT:  Negative for congestion, ear discharge, ear pain, facial swelling, hearing loss, nosebleeds, postnasal drip, rhinorrhea, sinus pressure, sneezing, sore throat, tinnitus, trouble swallowing and voice change.    Eyes:  Negative for photophobia, pain, discharge, redness, itching and visual disturbance.   Respiratory:  Negative for cough, chest tightness, shortness of breath and wheezing.    Cardiovascular:  Negative for chest pain, palpitations and leg swelling.   Gastrointestinal:  Positive for abdominal pain, diarrhea and nausea. Negative for abdominal distention, anal bleeding, blood in stool, constipation, rectal pain and vomiting.   Endocrine: Negative for cold intolerance, heat intolerance, polydipsia, polyphagia and polyuria.   Genitourinary:  Negative for difficulty urinating, dysuria and flank pain.   Musculoskeletal:  Negative for arthralgias, back pain, joint swelling, myalgias and neck pain.   Skin:  Negative for rash.   Neurological:  Negative for dizziness, tremors, seizures, syncope, speech difficulty, weakness, light-headedness, numbness and headaches.   Psychiatric/Behavioral:  Negative for behavioral problems, confusion, decreased concentration, dysphoric mood, sleep disturbance and suicidal ideas. The patient is not nervous/anxious and is not hyperactive.        /72 (BP Location: Left arm, Patient Position: Sitting, BP Method: Medium (Manual))   Pulse 80   Temp 98 °F (36.7 °C)   Resp 18   Ht 5' 2" (1.575 m)   Wt 101.7 kg (224 lb 3.3 oz)   LMP 01/01/2012   SpO2 99%   BMI 41.01 kg/m²   Physical Exam  Vitals and nursing note reviewed.   Constitutional:       General: She is not in acute distress.     Appearance: Normal appearance. She is well-developed. She is not ill-appearing or toxic-appearing.   HENT:      Head: Normocephalic and atraumatic.      Right Ear: Tympanic membrane, ear canal and external ear normal.      Left Ear: Tympanic membrane, ear " canal and external ear normal.      Nose: Nose normal.      Mouth/Throat:      Lips: Pink.      Mouth: Mucous membranes are moist.      Pharynx: No oropharyngeal exudate or posterior oropharyngeal erythema.   Eyes:      General: No scleral icterus.        Right eye: No discharge.         Left eye: No discharge.      Extraocular Movements: Extraocular movements intact.      Conjunctiva/sclera: Conjunctivae normal.   Cardiovascular:      Rate and Rhythm: Normal rate and regular rhythm.      Pulses: Normal pulses.      Heart sounds: Normal heart sounds. No murmur heard.  Pulmonary:      Effort: Pulmonary effort is normal. No respiratory distress.      Breath sounds: Normal breath sounds. No wheezing or rales.   Abdominal:      General: Bowel sounds are normal. There is no distension.      Palpations: Abdomen is soft. There is no mass.      Tenderness: There is no abdominal tenderness. There is no right CVA tenderness, left CVA tenderness, guarding or rebound.      Hernia: No hernia is present.   Musculoskeletal:      Cervical back: Normal range of motion and neck supple. No rigidity or tenderness.   Lymphadenopathy:      Cervical: No cervical adenopathy.   Skin:     General: Skin is warm and dry.   Neurological:      General: No focal deficit present.      Mental Status: She is alert. Mental status is at baseline.   Psychiatric:         Mood and Affect: Mood normal.         Behavior: Behavior normal. Behavior is cooperative.         ASSESSMENT/PLAN    1. Acute gastroenteritis  Gastroenteritis--Hydrate with clear liquids. (Dilute Powerade, Gatorade, water, flat Sprite). No carbonation (increases nausea). When hungry, slowly advance diet to full liquids and then to cold, bland diet. Zofran Rx as directed. If emesis starts and can't be resolved with Zofran, go to ER.   If diarrhea starts, I recommend good, soft toilet paper or baby wipes. Consider using Desitin or Jose Juan's Butt Paste. Wash hands thoroughly. I don't  especially recommend Imodium or Pepto-Bismol; let the diarrhea take its course. You will not become dehydrated as long as we stop the vomiting.     See Zofran below.    May return to work without restrictions February 1, 2024.  If symptoms are not improved then we will re-evaluate her in person.    2. Morbid obesity due to excess calories  Assessment & Plan:  Weight loss by calorie restriction and exercise.  Monitor.        Other orders  -     ondansetron (ZOFRAN-ODT) 8 MG TbDL; Take 1 tablet (8 mg total) by mouth every 6 (six) hours as needed.  Dispense: 12 tablet; Refill: 0            Most recent some lab results reviewed with patient.  Any new prescription medications gone over in detail including reason for taking the medication, most common possible side effects and possible costs, etcetera.    Chronic conditions updated. Other than changes or additions as above, cont current medications and maintain follow-up with specialists if indicated.     Lucas Harper MD  A dictation device was used to produce this document. Use of such devices sometimes results in grammatical errors or replacement of words that sound similarly.

## 2024-01-31 ENCOUNTER — PATIENT MESSAGE (OUTPATIENT)
Dept: PRIMARY CARE CLINIC | Facility: CLINIC | Age: 54
End: 2024-01-31
Payer: COMMERCIAL

## 2024-01-31 NOTE — TELEPHONE ENCOUNTER
Pt would like her return to work form fill out for Jan 30,31,2024 was out of Work  Pt will return back to work on Feb 1,2024

## 2024-02-01 PROBLEM — E66.01 MORBID OBESITY DUE TO EXCESS CALORIES: Status: ACTIVE | Noted: 2024-02-01

## 2024-02-16 DIAGNOSIS — L73.2 HIDRADENITIS SUPPURATIVA: ICD-10-CM

## 2024-02-18 RX ORDER — METFORMIN HYDROCHLORIDE 500 MG/1
TABLET, EXTENDED RELEASE ORAL
Qty: 30 TABLET | Refills: 3 | Status: SHIPPED | OUTPATIENT
Start: 2024-02-18

## 2024-03-18 ENCOUNTER — TELEPHONE (OUTPATIENT)
Dept: DERMATOLOGY | Facility: CLINIC | Age: 54
End: 2024-03-18
Payer: COMMERCIAL

## 2024-03-18 NOTE — TELEPHONE ENCOUNTER
----- Message from Natalie Cortez MD sent at 3/18/2024  2:23 PM CDT -----  Regarding: FW: [facility] APPOINTMENT  Contact: [phone]  Yes, 9 or 1:20 is fine tomorrow. JAM  ----- Message -----  From: Sudha Fowler MA  Sent: 3/18/2024   1:19 PM CDT  To: Natalie Cortez MD  Subject: FW: [facility] APPOINTMENT                              Tomorrow ok?      ----- Message -----  From: Phoebe Bailey  Sent: 3/18/2024   1:10 PM CDT  To: Dana Estrada Staff  Subject: [facility] APPOINTMENT                                  Calling to schedule an appointment per HS flare up TODAY or as soon as possible. Please call patient to schedule today. Patient also needs to reschedule procedure for May.

## 2024-03-19 ENCOUNTER — OFFICE VISIT (OUTPATIENT)
Dept: DERMATOLOGY | Facility: CLINIC | Age: 54
End: 2024-03-19
Payer: COMMERCIAL

## 2024-03-19 DIAGNOSIS — L73.2 HIDRADENITIS SUPPURATIVA: Primary | ICD-10-CM

## 2024-03-19 PROCEDURE — 99213 OFFICE O/P EST LOW 20 MIN: CPT | Mod: PBBFAC | Performed by: DERMATOLOGY

## 2024-03-19 PROCEDURE — 99999 PR PBB SHADOW E&M-EST. PATIENT-LVL III: CPT | Mod: PBBFAC,,, | Performed by: DERMATOLOGY

## 2024-03-19 PROCEDURE — 87070 CULTURE OTHR SPECIMN AEROBIC: CPT | Performed by: DERMATOLOGY

## 2024-03-19 PROCEDURE — 99214 OFFICE O/P EST MOD 30 MIN: CPT | Mod: 25,S$GLB,, | Performed by: DERMATOLOGY

## 2024-03-19 PROCEDURE — 87077 CULTURE AEROBIC IDENTIFY: CPT | Performed by: DERMATOLOGY

## 2024-03-19 PROCEDURE — 87186 SC STD MICRODIL/AGAR DIL: CPT | Performed by: DERMATOLOGY

## 2024-03-19 RX ORDER — DOXYCYCLINE 100 MG/1
CAPSULE ORAL
Qty: 20 CAPSULE | Refills: 0 | Status: SHIPPED | OUTPATIENT
Start: 2024-03-19

## 2024-03-19 NOTE — ASSESSMENT & PLAN NOTE
"Today's Plan:      LIFESTYLE:    Continue weight loss and dietary modifications   Avoid sugars and processed foods  Limit "white" foods ie. Bread, rice, pasta, potatoes  - recommend limiting to 1/2 cup per serving  Limit dairy     Keep skin cool as overheating can flare HS    Avoid shaving affected areas and wearing tight fitting clothing as friction exacerbates disease; Consider Laser Hair Removal    TREATMENT:    Continue:  Hibiclens wash daily  Clindamycin solution 1x/day to "quiet affected areas" and 2x/day to flared affected areas  Metformin ER at 500mg every night with dinner    Turmeric 1000mg every day    Start:  Doxy 100mg 2x/day x 10 days   Take with food and not within 1 hour prior to lying down until face clear then decrease to 1/2 tablet every day    Schedule deroofing procx for left axilla    When flare:   Dilute bleach compress every day  Mometasone cream to flared area under bandaid x 12 hours every day      RESOURCE:  [email or web address]      FOR FLARES (new painful bump):  Message office through [employer] for injection which will often hasten resolution of tender, red bump    Can apply warm/hot compresses on a painful, deep lump    If notice a foul-smelling drainage, can use Hydrogen Peroxide to cleanse area followed by application of Medi-honey to open area (apply Medi-honey 1x/day)            "

## 2024-03-19 NOTE — PROGRESS NOTES
"  Subjective:      Patient ID:  Kathrine Ashraf is a 54 y.o. female who presents for   Chief Complaint   Patient presents with    Hidradenitis Suppurativa     Flare     Patient with Hidradenitis suppurativa. Last seen on 12/12/2023 for IL K 20 to L axilla abscesses     Condition overall stable. Last flare x 3 months ago until 3 days ago    C/o new lesion(s) L axilla - location. Present x 4 days + tender    Current treatment regimen:  Dilute bleach compresses weekly to 2 - 3x/week  Wash daily with dilute bleach (Cln wash)  Clindamycin solution 2x daily  Metformin ER at 500mg every day  Turmeric 1000mg every day  V8 juice  Mometasone cream prn flares      Past tx:  Doxycycline 100mg 2x/day x 1 week - completed   Spironolactone - takes only when "really needs it" causes leg pain. States last does was between October, early November 2023      Lifestyle modifications - diet, smoking, shaving etc  Avoid sugars and processed foods  Limit "white" foods ie. Bread, rice, pasta, potatoes    Limit dairy          Review of Systems   Constitutional:  Positive for weight loss (213# - lost 7 lbs). Negative for weight gain.   Gastrointestinal:  Negative for diarrhea (started due to spironolactone (stopped)).   Genitourinary:  Negative for irregular periods (hysterectomy - 2012).   Musculoskeletal:  Positive for arthralgias (knees).   Skin:  Positive for abscesses (left axilla).   Hematologic/Lymphatic: Bruises/bleeds easily.       Objective:   Physical Exam   Constitutional: She appears well-developed and well-nourished. No distress.   Neurological: She is alert and oriented to person, place, and time. She is not disoriented.   Psychiatric: She has a normal mood and affect.   Skin:   Areas Examined (abnormalities noted in diagram):   Chest / Axilla Inspection Performed           Diagram Legend     Erythematous scaling macule/papule c/w actinic keratosis       Vascular papule c/w angioma      Pigmented verrucoid papule/plaque c/w " "seborrheic keratosis      Yellow umbilicated papule c/w sebaceous hyperplasia      Irregularly shaped tan macule c/w lentigo     1-2 mm smooth white papules consistent with Milia      Movable subcutaneous cyst with punctum c/w epidermal inclusion cyst      Subcutaneous movable cyst c/w pilar cyst      Firm pink to brown papule c/w dermatofibroma      Pedunculated fleshy papule(s) c/w skin tag(s)      Evenly pigmented macule c/w junctional nevus     Mildly variegated pigmented, slightly irregular-bordered macule c/w mildly atypical nevus      Flesh colored to evenly pigmented papule c/w intradermal nevus       Pink pearly papule/plaque c/w basal cell carcinoma      Erythematous hyperkeratotic cursted plaque c/w SCC      Surgical scar with no sign of skin cancer recurrence      Open and closed comedones      Inflammatory papules and pustules      Verrucoid papule consistent consistent with wart     Erythematous eczematous patches and plaques     Dystrophic onycholytic nail with subungual debris c/w onychomycosis     Umbilicated papule    Erythematous-base heme-crusted tan verrucoid plaque consistent with inflamed seborrheic keratosis     Erythematous Silvery Scaling Plaque c/w Psoriasis     See annotation      Assessment / Plan:        Hidradenitis suppurativa  -     Aerobic culture  -     doxycycline (VIBRAMYCIN) 100 MG Cap; Take 1 po bid with food and not within 1 hour prior to lying down  Dispense: 20 capsule; Refill: 0      Hidradenitis suppurativa  Today's Plan:      LIFESTYLE:    Continue weight loss and dietary modifications   Avoid sugars and processed foods  Limit "white" foods ie. Bread, rice, pasta, potatoes  - recommend limiting to 1/2 cup per serving  Limit dairy     Keep skin cool as overheating can flare HS    Avoid shaving affected areas and wearing tight fitting clothing as friction exacerbates disease; Consider Laser Hair Removal    TREATMENT:    Continue:  Hibiclens wash daily  Clindamycin solution " "1x/day to "quiet affected areas" and 2x/day to flared affected areas  Metformin ER at 500mg every night with dinner    Turmeric 1000mg every day    Start:  Doxy 100mg 2x/day x 10 days   Take with food and not within 1 hour prior to lying down until face clear then decrease to 1/2 tablet every day    Schedule deroofing procx for left axilla    When flare:   Dilute bleach compress every day  Mometasone cream to flared area under bandaid x 12 hours every day      RESOURCE:  [email or web address]      FOR FLARES (new painful bump):  Message office through [facility] for injection which will often hasten resolution of tender, red bump    Can apply warm/hot compresses on a painful, deep lump    If notice a foul-smelling drainage, can use Hydrogen Peroxide to cleanse area followed by application of Medi-honey to open area (apply Medi-honey 1x/day)              No follow-ups on file.    "

## 2024-03-19 NOTE — PATIENT INSTRUCTIONS
"LIFESTYLE:    Continue weight loss and dietary modifications   Avoid sugars and processed foods  Limit "white" foods ie. Bread, rice, pasta, potatoes  - recommend limiting to 1/2 cup per serving  Limit dairy     Keep skin cool as overheating can flare HS    Avoid shaving affected areas and wearing tight fitting clothing as friction exacerbates disease; Consider Laser Hair Removal    TREATMENT:    Continue:  Hibiclens wash daily  Clindamycin solution 1x/day to "quiet affected areas" and 2x/day to flared affected areas  Metformin ER at 500mg every night with dinner    Turmeric 1000mg every day    Start:  Doxy 100mg 2x/day x 10 days   Take with food and not within 1 hour prior to lying down until face clear then decrease to 1/2 tablet every day    Schedule deroofing procx for left axilla    When flare:   Dilute bleach compress every day  Mometasone cream to flared area under bandaid x 12 hours every day      RESOURCE:  [email or web address]      FOR FLARES (new painful bump):  [facility] injection which will often hasten resolution of tender, red bump    Can apply warm/hot compresses on a painful, deep lump    If notice a foul-smelling drainage, can use Hydrogen Peroxide to cleanse area followed by application of Medi-honey to open area (apply Medi-honey 1x/day)            "

## 2024-03-21 ENCOUNTER — TELEPHONE (OUTPATIENT)
Dept: DERMATOLOGY | Facility: CLINIC | Age: 54
End: 2024-03-21
Payer: COMMERCIAL

## 2024-03-21 LAB — BACTERIA SPEC AEROBE CULT: ABNORMAL

## 2024-03-21 RX ORDER — CIPROFLOXACIN 750 MG/1
750 TABLET, FILM COATED ORAL EVERY 12 HOURS
Qty: 20 TABLET | Refills: 0 | Status: SHIPPED | OUTPATIENT
Start: 2024-03-21 | End: 2024-03-31

## 2024-03-25 ENCOUNTER — OFFICE VISIT (OUTPATIENT)
Dept: PODIATRY | Facility: CLINIC | Age: 54
End: 2024-03-25
Payer: COMMERCIAL

## 2024-03-25 VITALS
WEIGHT: 224.19 LBS | DIASTOLIC BLOOD PRESSURE: 85 MMHG | HEIGHT: 62 IN | SYSTOLIC BLOOD PRESSURE: 137 MMHG | BODY MASS INDEX: 41.26 KG/M2 | HEART RATE: 73 BPM

## 2024-03-25 DIAGNOSIS — M79.671 RIGHT FOOT PAIN: ICD-10-CM

## 2024-03-25 DIAGNOSIS — L85.1 PLANTAR POROKERATOSIS, ACQUIRED: Primary | ICD-10-CM

## 2024-03-25 PROCEDURE — 3079F DIAST BP 80-89 MM HG: CPT | Mod: CPTII,S$GLB,, | Performed by: PODIATRIST

## 2024-03-25 PROCEDURE — 3075F SYST BP GE 130 - 139MM HG: CPT | Mod: CPTII,S$GLB,, | Performed by: PODIATRIST

## 2024-03-25 PROCEDURE — 99213 OFFICE O/P EST LOW 20 MIN: CPT | Mod: S$GLB,,, | Performed by: PODIATRIST

## 2024-03-25 PROCEDURE — 1159F MED LIST DOCD IN RCRD: CPT | Mod: CPTII,S$GLB,, | Performed by: PODIATRIST

## 2024-03-25 PROCEDURE — 99999 PR PBB SHADOW E&M-EST. PATIENT-LVL IV: CPT | Mod: PBBFAC,,, | Performed by: PODIATRIST

## 2024-03-25 PROCEDURE — 3008F BODY MASS INDEX DOCD: CPT | Mod: CPTII,S$GLB,, | Performed by: PODIATRIST

## 2024-03-25 PROCEDURE — 1160F RVW MEDS BY RX/DR IN RCRD: CPT | Mod: CPTII,S$GLB,, | Performed by: PODIATRIST

## 2024-03-25 RX ORDER — UREA 200 MG/G
1 CREAM TOPICAL DAILY
Qty: 75 G | Refills: 10 | Status: SHIPPED | OUTPATIENT
Start: 2024-03-25

## 2024-03-25 NOTE — PROGRESS NOTES
Chief Complaint   Patient presents with    Foot Ulcer         HPI:    Kathrine Ashraf is a 54 y.o. female who presents to clinic with concerns of painful callus sub 5th metatarsal head right foot, present for the past months.    Pain is worse with direct palpation and weight bearing.    Patient recalls no acute trauma to the area.    Treatment tried:  OTC callus remover, salon pedicure without improvement.       Patient Active Problem List   Diagnosis    Attention deficit disorder (ADD) in adult    Anxiety and depression    Chronic headache disorder    Impingement syndrome of left shoulder    Sciatica of left side    Snoring    Obstructive sleep apnea syndrome    Leg weakness    Hidradenitis suppurativa    Morbid obesity due to excess calories         Current Outpatient Medications on File Prior to Visit   Medication Sig Dispense Refill    busPIRone (BUSPAR) 10 MG tablet TAKE 1 TABLET(10 MG) BY MOUTH THREE TIMES DAILY AS NEEDED 90 tablet 5    ciprofloxacin HCl (CIPRO) 750 MG tablet Take 1 tablet (750 mg total) by mouth every 12 (twelve) hours. for 10 days 20 tablet 0    clindamycin (CLEOCIN T) 1 % external solution AAA axilla bid 60 mL 3    doxycycline (VIBRAMYCIN) 100 MG Cap Take 1 po bid with food and not within 1 hour prior to lying down 20 capsule 0    ergocalciferol (ERGOCALCIFEROL) 50,000 unit Cap Take by mouth.      estradioL (ESTRACE) 0.01 % (0.1 mg/gram) vaginal cream Apply 1-2g (pea-sized amount) vaginally every night for 2-4 weeks, followed by 2-3x/week 42.5 g 12    FLUoxetine 20 MG capsule Take 1 capsule (20 mg total) by mouth once daily. 30 capsule 11    LIDOcaine-prilocaine (EMLA) cream SMARTSI Topical Daily PRN      metFORMIN (GLUCOPHAGE-XR) 500 MG ER 24hr tablet TAKE 1 TABLET BY MOUTH EVERY DAY 30 tablet 3    methocarbamoL (ROBAXIN) 500 MG Tab Take 500 mg by mouth nightly as needed.      mometasone 0.1% (ELOCON) 0.1 % cream AAA every day when red and tender under occlusion 60 g 2    multivitamin  "capsule Take 1 capsule by mouth once daily.      mupirocin (BACTROBAN) 2 % ointment Apply to armpit twice daily 15 g 0    ondansetron (ZOFRAN-ODT) 8 MG TbDL Take 1 tablet (8 mg total) by mouth every 6 (six) hours as needed. 12 tablet 0    tiZANidine (ZANAFLEX) 4 MG tablet Take 4 mg by mouth 2 (two) times daily.      gabapentin (NEURONTIN) 100 MG capsule Take 1 capsule (100 mg total) by mouth every evening. (Patient not taking: Reported on 10/19/2023) 30 capsule 3     Current Facility-Administered Medications on File Prior to Visit   Medication Dose Route Frequency Provider Last Rate Last Admin    triamcinolone acetonide injection 40 mg  40 mg Intradermal Once Natalie Cortez MD             Review of patient's allergies indicates:   Allergen Reactions    Cyanocobalamin (vitamin b-12) Rash    Penicillins Anaphylaxis    Sulfamethoxazole-trimethoprim     Sulfa (sulfonamide antibiotics) Rash         ROS:  General ROS: negative for - chills, fatigue or fever  Cardiovascular ROS: no chest pain or dyspnea on exertion  Musculoskeletal ROS: negative for - joint pain or joint stiffness   Skin: Negative for rash, negative for nail or hair changes. Positive for  Corn/callus on the foot.         EXAM:    Vitals:    03/25/24 1412   BP: 137/85   Pulse: 73   Weight: 101.7 kg (224 lb 3.3 oz)   Height: 5' 2" (1.575 m)        General: alert and orient and in no apparent distress.      Vasc:   Palpable pedal pulses  Feet appropriately warm to touch.   Capillary refill time within normal limits.   Edema: Absent      Neuro:   Epicritic sensation intact.   Tinels sign:  Absent  Mulders click: Absent  SWMF: Absent      MSK:     normal toes without deformities  Muscle strength:  5/5  Joints:  without crepitus  Deformity: none      Derm:      Nucleated, Hyperkeratosis located at the right sub 5th metatarsal head, round, firm, fixed and tender, does not appear verrucous.   No other open lesions, macerations, or rashes noted.   Skin is " otherwise Normal on the soles.   Erythema:  none at the affected location  Bruising:  absent          MEDICAL DECISION MAKING:       ICD-10-CM ICD-9-CM    1. Plantar porokeratosis, acquired  L85.1 701.1 urea 20 % Crea    sub 5th met head      2. Right foot pain  M79.671 729.5               I counseled the patient on the patient's conditions, their implications and medical management.  Shoe and activity modification as needed for relief.   Urea cream  as ordered.   Avoid barefoot walking to protect skin on the feet.   Call or return to clinic prn if these symptoms worsen or fail to improve as anticipated. Patient is amenable to plan.

## 2024-03-27 DIAGNOSIS — Z12.31 OTHER SCREENING MAMMOGRAM: ICD-10-CM

## 2024-04-11 ENCOUNTER — HOSPITAL ENCOUNTER (OUTPATIENT)
Dept: RADIOLOGY | Facility: OTHER | Age: 54
Discharge: HOME | End: 2024-04-11
Attending: FAMILY MEDICINE
Payer: COMMERCIAL

## 2024-04-11 DIAGNOSIS — Z12.31 OTHER SCREENING MAMMOGRAM: ICD-10-CM

## 2024-04-11 PROCEDURE — 77063 BREAST TOMOSYNTHESIS BI: CPT | Mod: 26,,, | Performed by: RADIOLOGY

## 2024-04-11 PROCEDURE — 77063 BREAST TOMOSYNTHESIS BI: CPT | Mod: TC

## 2024-04-11 PROCEDURE — 77067 SCR MAMMO BI INCL CAD: CPT | Mod: 26,,, | Performed by: RADIOLOGY

## 2024-06-05 ENCOUNTER — TELEPHONE (OUTPATIENT)
Dept: DERMATOLOGY | Facility: CLINIC | Age: 54
End: 2024-06-05
Payer: COMMERCIAL

## 2024-06-05 NOTE — TELEPHONE ENCOUNTER
----- Message from Anna Joya sent at 6/5/2024 11:31 AM CDT -----  Regarding: appt access  Contact: [phone]  Pt calling in regards to rescheduling procedure 6/6/24, pls call

## 2024-06-18 ENCOUNTER — HOSPITAL ENCOUNTER (OUTPATIENT)
Facility: HOSPITAL | Age: 54
Discharge: HOME | End: 2024-06-18
Attending: EMERGENCY MEDICINE | Admitting: EMERGENCY MEDICINE
Payer: COMMERCIAL

## 2024-06-18 VITALS
OXYGEN SATURATION: 98 % | SYSTOLIC BLOOD PRESSURE: 105 MMHG | HEART RATE: 81 BPM | WEIGHT: 210 LBS | BODY MASS INDEX: 38.64 KG/M2 | DIASTOLIC BLOOD PRESSURE: 65 MMHG | RESPIRATION RATE: 20 BRPM | HEIGHT: 62 IN | TEMPERATURE: 98 F

## 2024-06-18 DIAGNOSIS — R07.9 CHEST PAIN: Primary | ICD-10-CM

## 2024-06-18 DIAGNOSIS — I83.91 SPIDER VEIN OF RIGHT LOWER EXTREMITY: ICD-10-CM

## 2024-06-18 DIAGNOSIS — M79.605 LEFT LEG PAIN: ICD-10-CM

## 2024-06-18 DIAGNOSIS — R94.31 EKG ABNORMALITY: ICD-10-CM

## 2024-06-18 DIAGNOSIS — R06.02 SOB (SHORTNESS OF BREATH): ICD-10-CM

## 2024-06-18 DIAGNOSIS — R07.89 CHEST DISCOMFORT: ICD-10-CM

## 2024-06-18 LAB
ALBUMIN SERPL BCP-MCNC: 3.6 G/DL (ref 3.5–5.2)
ALP SERPL-CCNC: 98 U/L (ref 55–135)
ALT SERPL W/O P-5'-P-CCNC: 24 U/L (ref 10–44)
ANION GAP SERPL CALC-SCNC: 8 MMOL/L (ref 8–16)
ASCENDING AORTA: 3.06 CM
AST SERPL-CCNC: 21 U/L (ref 10–40)
BASOPHILS # BLD AUTO: 0.03 K/UL (ref 0–0.2)
BASOPHILS NFR BLD: 0.7 % (ref 0–1.9)
BILIRUB SERPL-MCNC: 0.9 MG/DL (ref 0.1–1)
BNP SERPL-MCNC: <10 PG/ML (ref 0–99)
BSA FOR ECHO PROCEDURE: 2.04 M2
BUN SERPL-MCNC: 11 MG/DL (ref 6–20)
CALCIUM SERPL-MCNC: 9.6 MG/DL (ref 8.7–10.5)
CHLORIDE SERPL-SCNC: 105 MMOL/L (ref 95–110)
CO2 SERPL-SCNC: 23 MMOL/L (ref 23–29)
CREAT SERPL-MCNC: 0.7 MG/DL (ref 0.5–1.4)
CV ECHO LV RWT: 0.49 CM
CV STRESS BASE HR: 61 BPM
D DIMER PPP IA.FEU-MCNC: 0.48 MG/L FEU
DIASTOLIC BLOOD PRESSURE: 74 MMHG
DIFFERENTIAL METHOD BLD: ABNORMAL
DOP CALC LVOT AREA: 3.4 CM2
DOP CALC LVOT DIAMETER: 2.07 CM
DOP CALC LVOT PEAK VEL: 1.17 M/S
DOP CALC LVOT STROKE VOLUME: 79.31 CM3
DOP CALCLVOT PEAK VEL VTI: 23.58 CM
E WAVE DECELERATION TIME: 188.63 MSEC
E/A RATIO: 1.42
E/E' RATIO: 7.33 M/S
ECHO LV POSTERIOR WALL: 0.95 CM (ref 0.6–1.1)
EJECTION FRACTION: 63 %
EOSINOPHIL # BLD AUTO: 0.2 K/UL (ref 0–0.5)
EOSINOPHIL NFR BLD: 4.2 % (ref 0–8)
ERYTHROCYTE [DISTWIDTH] IN BLOOD BY AUTOMATED COUNT: 13.4 % (ref 11.5–14.5)
EST. GFR  (NO RACE VARIABLE): >60 ML/MIN/1.73 M^2
FRACTIONAL SHORTENING: 35 % (ref 28–44)
GLUCOSE SERPL-MCNC: 94 MG/DL (ref 70–110)
HCT VFR BLD AUTO: 42 % (ref 37–48.5)
HCV AB SERPL QL IA: NORMAL
HGB BLD-MCNC: 13.3 G/DL (ref 12–16)
HIV 1+2 AB+HIV1 P24 AG SERPL QL IA: NORMAL
IMM GRANULOCYTES # BLD AUTO: 0.01 K/UL (ref 0–0.04)
IMM GRANULOCYTES NFR BLD AUTO: 0.2 % (ref 0–0.5)
INTERVENTRICULAR SEPTUM: 0.7 CM (ref 0.6–1.1)
LA MAJOR: 4.36 CM
LA MINOR: 4.37 CM
LA WIDTH: 3.77 CM
LEFT ATRIUM SIZE: 2.63 CM
LEFT ATRIUM VOLUME INDEX MOD: 15.6 ML/M2
LEFT ATRIUM VOLUME INDEX: 18.9 ML/M2
LEFT ATRIUM VOLUME MOD: 30.44 CM3
LEFT ATRIUM VOLUME: 36.79 CM3
LEFT INTERNAL DIMENSION IN SYSTOLE: 2.52 CM (ref 2.1–4)
LEFT VENTRICLE DIASTOLIC VOLUME INDEX: 33.65 ML/M2
LEFT VENTRICLE DIASTOLIC VOLUME: 65.61 ML
LEFT VENTRICLE MASS INDEX: 48 G/M2
LEFT VENTRICLE SYSTOLIC VOLUME INDEX: 11.6 ML/M2
LEFT VENTRICLE SYSTOLIC VOLUME: 22.71 ML
LEFT VENTRICULAR INTERNAL DIMENSION IN DIASTOLE: 3.89 CM (ref 3.5–6)
LEFT VENTRICULAR MASS: 93.09 G
LIPASE SERPL-CCNC: 26 U/L (ref 4–60)
LV LATERAL E/E' RATIO: 5.87 M/S
LV SEPTAL E/E' RATIO: 9.78 M/S
LYMPHOCYTES # BLD AUTO: 1.6 K/UL (ref 1–4.8)
LYMPHOCYTES NFR BLD: 38.7 % (ref 18–48)
MCH RBC QN AUTO: 30.4 PG (ref 27–31)
MCHC RBC AUTO-ENTMCNC: 31.7 G/DL (ref 32–36)
MCV RBC AUTO: 96 FL (ref 82–98)
MONOCYTES # BLD AUTO: 0.3 K/UL (ref 0.3–1)
MONOCYTES NFR BLD: 8.4 % (ref 4–15)
MV A" WAVE DURATION": 11.99 MSEC
MV PEAK A VEL: 0.62 M/S
MV PEAK E VEL: 0.88 M/S
MV STENOSIS PRESSURE HALF TIME: 54.7 MS
MV VALVE AREA P 1/2 METHOD: 4.02 CM2
NEUTROPHILS # BLD AUTO: 1.9 K/UL (ref 1.8–7.7)
NEUTROPHILS NFR BLD: 47.8 % (ref 38–73)
NRBC BLD-RTO: 0 /100 WBC
OHS CV CPX 1 MINUTE RECOVERY HEART RATE: 107 BPM
OHS CV CPX 85 PERCENT MAX PREDICTED HEART RATE MALE: 142
OHS CV CPX ESTIMATED METS: 8
OHS CV CPX MAX PREDICTED HEART RATE: 167
OHS CV CPX PATIENT IS FEMALE: 1
OHS CV CPX PATIENT IS MALE: 0
OHS CV CPX PEAK DIASTOLIC BLOOD PRESSURE: 72 MMHG
OHS CV CPX PEAK HEAR RATE: 162 BPM
OHS CV CPX PEAK RATE PRESSURE PRODUCT: NORMAL
OHS CV CPX PEAK SYSTOLIC BLOOD PRESSURE: 166 MMHG
OHS CV CPX PERCENT MAX PREDICTED HEART RATE ACHIEVED: 102
OHS CV CPX RATE PRESSURE PRODUCT PRESENTING: 7930
OHS CV RV/LV RATIO: 0.88 CM
OHS QRS DURATION: 84 MS
OHS QTC CALCULATION: 417 MS
PLATELET # BLD AUTO: 226 K/UL (ref 150–450)
PMV BLD AUTO: 11 FL (ref 9.2–12.9)
POST STRESS EJECTION FRACTION: 68 %
POTASSIUM SERPL-SCNC: 3.8 MMOL/L (ref 3.5–5.1)
PROT SERPL-MCNC: 7.1 G/DL (ref 6–8.4)
PULM VEIN S/D RATIO: 1.26
PV PEAK D VEL: 0.47 M/S
PV PEAK S VEL: 0.59 M/S
RA MAJOR: 5.07 CM
RA PRESSURE ESTIMATED: 3 MMHG
RA WIDTH: 3.61 CM
RBC # BLD AUTO: 4.38 M/UL (ref 4–5.4)
RIGHT VENTRICULAR END-DIASTOLIC DIMENSION: 3.44 CM
SINUS: 2.66 CM
SODIUM SERPL-SCNC: 136 MMOL/L (ref 136–145)
STJ: 2.57 CM
STRESS ECHO POST EXERCISE DUR MIN: 5 MINUTES
STRESS ECHO POST EXERCISE DUR SEC: 39 SECONDS
SYSTOLIC BLOOD PRESSURE: 130 MMHG
TDI LATERAL: 0.15 M/S
TDI SEPTAL: 0.09 M/S
TDI: 0.12 M/S
TRICUSPID ANNULAR PLANE SYSTOLIC EXCURSION: 2.57 CM
TROPONIN I SERPL DL<=0.01 NG/ML-MCNC: 0.01 NG/ML (ref 0–0.03)
TROPONIN I SERPL DL<=0.01 NG/ML-MCNC: <0.006 NG/ML (ref 0–0.03)
WBC # BLD AUTO: 4.06 K/UL (ref 3.9–12.7)
Z-SCORE OF LEFT VENTRICULAR DIMENSION IN END DIASTOLE: -3.57
Z-SCORE OF LEFT VENTRICULAR DIMENSION IN END SYSTOLE: -2.41

## 2024-06-18 PROCEDURE — 85025 COMPLETE CBC W/AUTO DIFF WBC: CPT | Performed by: PHYSICIAN ASSISTANT

## 2024-06-18 PROCEDURE — G0378 HOSPITAL OBSERVATION PER HR: HCPCS

## 2024-06-18 PROCEDURE — 84484 ASSAY OF TROPONIN QUANT: CPT | Mod: 91 | Performed by: PHYSICIAN ASSISTANT

## 2024-06-18 PROCEDURE — 83690 ASSAY OF LIPASE: CPT | Performed by: PHYSICIAN ASSISTANT

## 2024-06-18 PROCEDURE — 93005 ELECTROCARDIOGRAM TRACING: CPT

## 2024-06-18 PROCEDURE — 93010 ELECTROCARDIOGRAM REPORT: CPT | Mod: ,,, | Performed by: INTERNAL MEDICINE

## 2024-06-18 PROCEDURE — 83880 ASSAY OF NATRIURETIC PEPTIDE: CPT | Performed by: PHYSICIAN ASSISTANT

## 2024-06-18 PROCEDURE — 85379 FIBRIN DEGRADATION QUANT: CPT | Performed by: PHYSICIAN ASSISTANT

## 2024-06-18 PROCEDURE — 99285 EMERGENCY DEPT VISIT HI MDM: CPT | Mod: 25

## 2024-06-18 PROCEDURE — 80053 COMPREHEN METABOLIC PANEL: CPT | Performed by: PHYSICIAN ASSISTANT

## 2024-06-18 PROCEDURE — 25000003 PHARM REV CODE 250

## 2024-06-18 PROCEDURE — 86803 HEPATITIS C AB TEST: CPT | Performed by: PHYSICIAN ASSISTANT

## 2024-06-18 PROCEDURE — 84484 ASSAY OF TROPONIN QUANT: CPT

## 2024-06-18 PROCEDURE — 87389 HIV-1 AG W/HIV-1&-2 AB AG IA: CPT | Performed by: PHYSICIAN ASSISTANT

## 2024-06-18 RX ORDER — IBUPROFEN 200 MG
24 TABLET ORAL
Status: DISCONTINUED | OUTPATIENT
Start: 2024-06-18 | End: 2024-06-18 | Stop reason: HOSPADM

## 2024-06-18 RX ORDER — FAMOTIDINE 20 MG/1
20 TABLET, FILM COATED ORAL 2 TIMES DAILY
Qty: 28 TABLET | Refills: 0 | Status: SHIPPED | OUTPATIENT
Start: 2024-06-18 | End: 2024-07-02

## 2024-06-18 RX ORDER — PROCHLORPERAZINE EDISYLATE 5 MG/ML
5 INJECTION INTRAMUSCULAR; INTRAVENOUS EVERY 6 HOURS PRN
Status: DISCONTINUED | OUTPATIENT
Start: 2024-06-18 | End: 2024-06-18 | Stop reason: HOSPADM

## 2024-06-18 RX ORDER — ONDANSETRON 8 MG/1
8 TABLET, ORALLY DISINTEGRATING ORAL EVERY 8 HOURS PRN
Status: DISCONTINUED | OUTPATIENT
Start: 2024-06-18 | End: 2024-06-18 | Stop reason: HOSPADM

## 2024-06-18 RX ORDER — GLUCAGON 1 MG
1 KIT INJECTION
Status: DISCONTINUED | OUTPATIENT
Start: 2024-06-18 | End: 2024-06-18 | Stop reason: HOSPADM

## 2024-06-18 RX ORDER — ACETAMINOPHEN 500 MG
1000 TABLET ORAL EVERY 8 HOURS PRN
Status: DISCONTINUED | OUTPATIENT
Start: 2024-06-18 | End: 2024-06-18 | Stop reason: HOSPADM

## 2024-06-18 RX ORDER — FLUOXETINE HYDROCHLORIDE 20 MG/1
20 CAPSULE ORAL DAILY
Status: DISCONTINUED | OUTPATIENT
Start: 2024-06-18 | End: 2024-06-18 | Stop reason: HOSPADM

## 2024-06-18 RX ORDER — IBUPROFEN 200 MG
16 TABLET ORAL
Status: DISCONTINUED | OUTPATIENT
Start: 2024-06-18 | End: 2024-06-18 | Stop reason: HOSPADM

## 2024-06-18 RX ORDER — ACETAMINOPHEN 325 MG/1
650 TABLET ORAL EVERY 4 HOURS PRN
Status: DISCONTINUED | OUTPATIENT
Start: 2024-06-18 | End: 2024-06-18 | Stop reason: HOSPADM

## 2024-06-18 RX ORDER — METHOCARBAMOL 500 MG/1
500 TABLET, FILM COATED ORAL NIGHTLY PRN
Status: DISCONTINUED | OUTPATIENT
Start: 2024-06-18 | End: 2024-06-18 | Stop reason: HOSPADM

## 2024-06-18 RX ORDER — SODIUM CHLORIDE 0.9 % (FLUSH) 0.9 %
10 SYRINGE (ML) INJECTION EVERY 12 HOURS PRN
Status: DISCONTINUED | OUTPATIENT
Start: 2024-06-18 | End: 2024-06-18 | Stop reason: HOSPADM

## 2024-06-18 RX ORDER — NALOXONE HCL 0.4 MG/ML
0.02 VIAL (ML) INJECTION
Status: DISCONTINUED | OUTPATIENT
Start: 2024-06-18 | End: 2024-06-18 | Stop reason: HOSPADM

## 2024-06-18 RX ORDER — HYDROXYZINE HYDROCHLORIDE 25 MG/1
25 TABLET, FILM COATED ORAL 3 TIMES DAILY PRN
Qty: 20 TABLET | Refills: 0 | Status: SHIPPED | OUTPATIENT
Start: 2024-06-18

## 2024-06-18 RX ADMIN — FLUOXETINE HYDROCHLORIDE 20 MG: 20 CAPSULE ORAL at 12:06

## 2024-06-18 NOTE — ED TRIAGE NOTES
Midsternal chest pain/tightness starting last night. Pt reports new bruising in legs without injury. Hx venous insufficiency.

## 2024-06-18 NOTE — ED NOTES
LOC/ APPEARANCE: The patient is AAOx4. Pt is speaking appropriately, no slurred speech. Continuous cardiac monitor applied to patient. Pt is clean and well groomed. No JVD visible. Pt reports pain level of 5/10. Pt updated on POC. Bed low and locked with side rails up x2, call bell in pt reach.  SKIN: Skin is warm dry and intact, and color is consistent with ethnicity. Capillary refill <3 seconds. No breakdown or brusing visible. Mucus membranes moist, acyanotic.  RESPIRATORY: Airway is open and patent. Respirations-spontaneous, unlabored, regular rate, equal bilaterally on inspiration and expiration. No accessory muscle use noted.   CARDIAC: Patient has regular heart rate.  + midsternal chest pain. Peripheral pulses present equal and strong throughout.  ABDOMEN: Soft and non-tender to palpation with no distention noted. Pt has no complaints of abnormal bowel movements, denies blood in stool. Pt reports normal appetite. + nausea  NEUROLOGIC: Eyes open spontaneously and facial expression symmetrical. Pt behavior appropriate to situation, and pt follows commands. Pt reports sensation present in all extremities when touched with a finger, denies any numbness or tingling. PERRLA  MUSCULOSKELETAL: Spontaneous movement noted to all extremities. Hand  equal and leg strength strong +5 bilaterally.   : No complaints of frequency, burning, urgency or blood in the urine. No complaints of incontinence.

## 2024-06-18 NOTE — ED NOTES
Patient identifiers verified and correct for Kathrine Ashraf.  LOC: The patient is awake, alert and aware of environment with an appropriate affect, the patient is oriented x 3 and speaking appropriately.   APPEARANCE: Patient appears comfortable and in no acute distress, patient is clean and well groomed.  SKIN: The skin is warm and dry, color consistent with ethnicity, patient has normal skin turgor and moist mucus membranes, skin intact, no breakdown. Bruising present behind knees.  MUSCULOSKELETAL: Patient moving all extremities spontaneously, no swelling noted.  RESPIRATORY: Airway is open and patent, respirations are spontaneous, patient has a normal effort and rate, no accessory muscle use noted, O2 sats noted at 98% on room air.  CARDIAC: Patient has a normal rate and regular rhythm, no edema noted, capillary refill < 3 seconds. Pt reports midsternal chest tightness.  GASTRO: Soft and non tender to palpation, no distention noted, normoactive bowel sounds present in all four quadrants. Pt states bowel movements have been regular.  : Pt denies any pain or frequency with urination.  NEURO: Pt opens eyes spontaneously, behavior appropriate to situation, follows commands, facial expression symmetrical, bilateral hand grasp equal and even, purposeful motor response noted, normal sensation in all extremities when touched with a finger.

## 2024-06-18 NOTE — PLAN OF CARE
[facility] - Emergency Dept  Initial Discharge Assessment       Primary Care Provider: Naomi Franklin MD    Admission Diagnosis: Chest pain [R07.9]    Admission Date: 6/18/2024  Expected Discharge Date:     Transition of Care Barriers: None    Payor: [employer] / Plan: [facility] CHOICE PLUS / Product Type: Commercial /     Extended Emergency Contact Information  Primary Emergency Contact: Michael Nava   [place]  Mobile Phone: [phone]  Relation: Son  Secondary Emergency Contact: Robert Rondon  Mobile Phone: [phone]  Relation: Daughter    Discharge Plan A: Home  Discharge Plan B: Home      [id number] - [place], [place] - [address] AT [facility] [address]  [address]  [facility] [address]  Phone: [phone] Fax: [phone]      Initial Assessment (most recent)       Adult Discharge Assessment - 06/18/24 1322          Discharge Assessment    Assessment Type Discharge Planning Assessment     Confirmed/corrected address, phone number and insurance Yes     Confirmed Demographics Correct on Facesheet     Source of Information patient     When was your last doctors appointment? 01/30/24     Does patient/caregiver understand observation status Yes     Communicated RODRICK with patient/caregiver Yes;Date not available/Unable to determine     Reason For Admission Chest pain     People in Home child(rocky), adult     Facility Arrived From: n/a - home     Do you expect to return to your current living situation? Yes     Do you have help at home or someone to help you manage your care at home? No     Prior to hospitilization cognitive status: Alert/Oriented     Current cognitive status: Alert/Oriented     Walking or Climbing Stairs Difficulty no     Dressing/Bathing Difficulty no     Home Accessibility wheelchair accessible     Home Layout Able to live on 1st floor     Equipment Currently Used at Home none     Readmission within 30 days? No     Patient currently  being followed by outpatient case management? No     Do you currently have service(s) that help you manage your care at home? No     Do you take prescription medications? Yes     Do you have prescription coverage? Yes     Coverage [facility] CHOICE PLUS     Do you have any problems affording any of your prescribed medications? No     Is the patient taking medications as prescribed? yes     Who is going to help you get home at discharge? Self     How do you get to doctors appointments? car, drives self     Are you on dialysis? No     Do you take coumadin? No     Discharge Plan A Home     Discharge Plan B Home     DME Needed Upon Discharge  none     Discharge Plan discussed with: Patient     Transition of Care Barriers None        Physical Activity    On average, how many days per week do you engage in moderate to strenuous exercise (like a brisk walk)? 5 days     On average, how many minutes do you engage in exercise at this level? 20 min        Financial Resource Strain    How hard is it for you to pay for the very basics like food, housing, medical care, and heating? Not very hard        Housing Stability    In the last 12 months, was there a time when you were not able to pay the mortgage or rent on time? No     At any time in the past 12 months, were you homeless or living in a shelter (including now)? No        Transportation Needs    Has the lack of transportation kept you from medical appointments, meetings, work or from getting things needed for daily living? No        Food Insecurity    Within the past 12 months, you worried that your food would run out before you got the money to buy more. Never true     Within the past 12 months, the food you bought just didn't last and you didn't have money to get more. Never true        Stress    Do you feel stress - tense, restless, nervous, or anxious, or unable to sleep at night because your mind is troubled all the time - these days? Very much         Social Isolation    How often do you feel lonely or isolated from those around you?  Rarely        Alcohol Use    Q1: How often do you have a drink containing alcohol? 2-4 times a month     Q2: How many drinks containing alcohol do you have on a typical day when you are drinking? 3 or 4     Q3: How often do you have six or more drinks on one occasion? Less than monthly        Utilities    In the past 12 months has the electric, gas, oil, or water company threatened to shut off services in your home? No        Health Literacy    How often do you need to have someone help you when you read instructions, pamphlets, or other written material from your doctor or pharmacy? Never        OTHER    Name(s) of People in Home Chapo Kay (Son) [phone] (P)                    Pt lives in a h with her son Chapo.  Pt does not use any HME, HH, HD, or coumadin. Pt is independent with ADLs, MRADLs and drives self.     No other discharge needs identified at this time.    Discharge Plan A and Plan B have been determined by review of patient's clinical status, future medical and therapeutic needs, and coverage/benefits for post-acute care in coordination with multidisciplinary team members.     Winifred Gonzalez LMSW  Case Management [facility]  [phone]

## 2024-06-18 NOTE — DISCHARGE SUMMARY
ED Observation Unit  Discharge Summary        History of Present Illness:    Patient is a 54-year-old female. She has a past medical history of anxiety, obesity, hypercholesterolemia, and hidradenitis. She presents to the ER for an emergent evaluation due to acute chest pain. She reports developing pressure and tightness in the center of her chest shortly after midnight last night. She denies any radiation of the pain. She states that when the pain developed she had associated nausea, shortness of breath, and sweats. She states that around 3 am when symptoms did not subside, she took a full aspirin. Since that time, she reports no change in symptoms with persistence of discomfort. She denies any mitigating or exacerbating factors. She states that she is concerned because her mother  of a heart attack. She also has pictures of her legs on her phone that she describes as spontaneous areas of redness, bruising, and swelling that have been occurring for the past 2 weeks. She denies having calf pain, but states that she does have intermittent pain behind both knees. She denies any history of PE or DVT in the past. She denies estrogen/hormone use. She denies any cancer history, recent surgery, or recent flight.      I reviewed the ED Provider Note dated 24 prior to my evaluation of this patient.  I reviewed all labs and imaging performed in the Main ED, prior to patient being placed in Observation. Patient was placed in the ED Observation Unit for Chest pain.    Observation Course:    Patient was placed in observation for monitoring and provocative cardiac testing.  Other testing obtained in the ED including lower extremity ultrasound showed no evidence of DVT.  D-dimer was negative.  Troponins x2 were unremarkable.  Stress echo was obtained and revealed no evidence of ischemia or other concerning cardiac abnormality.  Patient was noted to have a new first-degree AV block.  No evidence of ischemia or other  concerning arrhythmia on EKG.  Patient did like it her symptoms to heartburn.  Given negative workup, felt that patient was stable for discharge.  Her symptoms may be GI in nature.  She was given a prescription for Pepcid.  Patient also requested a refill on her hydroxyzine for her anxiety which was provided.  Recommended close follow-up for her abnormal EKG and if symptoms persist.  ED return precautions given.  Patient voiced understanding and is comfortable with discharge plan.    Consultants:    N/a    Final Diagnosis:  First degree AV block, non-cardiac chest pain, anxiety, acid reflux    Discharge Condition: Good    Disposition: Home or Self Care     Time spent on the discharge of the patient including review of hospital course with the patient. reviewing discharge medications and arranging follow-up care 35 minutes.  Patient was seen and examined on the date of discharge and determined to be suitable for discharge.    Follow Up:  Future Appointments   Date Time Provider Department Center   7/9/2024  4:00 PM Jamir Workman MD [facility]   8/8/2024 10:40 AM Natalie Cortez MD [facility] [place]

## 2024-06-18 NOTE — DISCHARGE INSTRUCTIONS
Your EKG did show a new abnormal rhythm.  Your other blood tests and stress test did not show any evidence of heart attack.  You should follow-up with your primary doctor to monitor your heart rhythm.   Your symptoms could be related to acid reflux.  Taking new medication as directed.    Return to the ER immediately for any new or significantly worsening symptoms.

## 2024-06-18 NOTE — PROGRESS NOTES
ED Observation Unit  Progress Note      HPI   Patient is a 54-year-old female. She has a past medical history of anxiety, obesity, hypercholesterolemia, and hidradenitis. She presents to the ER for an emergent evaluation due to acute chest pain. She reports developing pressure and tightness in the center of her chest shortly after midnight last night. She denies any radiation of the pain. She states that when the pain developed she had associated nausea, shortness of breath, and sweats. She states that around 3 am when symptoms did not subside, she took a full aspirin. Since that time, she reports no change in symptoms with persistence of discomfort. She denies any mitigating or exacerbating factors. She states that she is concerned because her mother  of a heart attack. She also has pictures of her legs on her phone that she describes as spontaneous areas of redness, bruising, and swelling that have been occurring for the past 2 weeks. She denies having calf pain, but states that she does have intermittent pain behind both knees. She denies any history of PE or DVT in the past. She denies estrogen/hormone use. She denies any cancer history, recent surgery, or recent flight.      I reviewed the ED Provider Note dated 24 prior to my evaluation of this patient.  I reviewed all labs and imaging performed in the Main ED, prior to patient being placed in Observation. Patient was placed in the ED Observation Unit for Chest pain.    Interval History   Stress test showed no evidence of ischemia or other concerning cardiac abnormality.  Troponins within normal limits.  Patient does still complain of chest discomfort.  States that if feels like heartburn.  Does not take any acid reducing medications.  Patient also requesting hydroxyzine refill for her anxiety.  Vital signs stable.  Patient in no acute distress.  Afebrile.  Will plan for discharge.    PMHx   Past Medical History:   Diagnosis Date    Allergy      Anxiety     Axillary hidradenitis suppurativa     Hypercholesterolemia     Obesity       Past Surgical History:   Procedure Laterality Date     SECTION      COLONOSCOPY N/A 2022    Procedure: COLONOSCOPY;  Surgeon: Franky Murcia MD;  Location: [facility] ([address] [place] [address]);  Service: Endoscopy;  Laterality: N/A;  -covid [place]-inst portal-tb    HYSTERECTOMY      partial    TUBAL LIGATION          Family Hx   Family History   Problem Relation Name Age of Onset    Heart disease Mother      Hypertension Mother      Cancer Father      Breast cancer Maternal Aunt Elham     Breast cancer Paternal Aunt      Melanoma Neg Hx      Ovarian cancer Neg Hx      Colon cancer Neg Hx          Social Hx   Social History     Socioeconomic History    Marital status:    Tobacco Use    Smoking status: Never    Smokeless tobacco: Never   Substance and Sexual Activity    Alcohol use: Yes     Comment: Socially    Drug use: Never    Sexual activity: Not Currently     Partners: Male     Birth control/protection: See Surgical Hx   Other Topics Concern    Are you pregnant or think you may be? No     Social Determinants of Health     Financial Resource Strain: Low Risk  (2024)    Overall Financial Resource Strain (CARDIA)     Difficulty of Paying Living Expenses: Not very hard   Food Insecurity: No Food Insecurity (2024)    Hunger Vital Sign     Worried About Running Out of Food in the Last Year: Never true     Ran Out of Food in the Last Year: Never true   Transportation Needs: No Transportation Needs (2024)    TRANSPORTATION NEEDS     Transportation : No   Physical Activity: Insufficiently Active (2024)    Exercise Vital Sign     Days of Exercise per Week: 5 days     Minutes of Exercise per Session: 20 min   Stress: Stress Concern Present (2024)    [employer] [place] [employer] - Occupational Stress Questionnaire     Feeling of Stress : Very much   Housing Stability: Low Risk   "(6/18/2024)    Housing Stability Vital Sign     Unable to Pay for Housing in the Last Year: No     Homeless in the Last Year: No        Vital Signs   Vitals:    06/18/24 0701 06/18/24 1010 06/18/24 1110 06/18/24 1542   BP: 135/75 (!) 151/83 (!) 139/93 123/83   Pulse: 77 64 (!) 54 72   Resp: 18 16 18 19   Temp: 97.8 °F (36.6 °C) 97.8 °F (36.6 °C) 97.9 °F (36.6 °C) 97.6 °F (36.4 °C)   TempSrc: Oral Oral Oral Oral   SpO2: 98% 99% 100% 99%   Weight: 95.3 kg (210 lb)  95.3 kg (210 lb)    Height: 5' 2" (1.575 m)  5' 2" (1.575 m)         Review of Systems  Review of Systems   Cardiovascular:  Positive for chest pain.   Gastrointestinal:  Positive for heartburn.       Brief Physical Exam/Reassessment   Physical Exam  Vitals reviewed.   Constitutional:       General: She is not in acute distress.     Appearance: She is not diaphoretic.   HENT:      Head: Normocephalic and atraumatic.   Cardiovascular:      Rate and Rhythm: Normal rate and regular rhythm.   Pulmonary:      Effort: Pulmonary effort is normal. No respiratory distress.   Musculoskeletal:      Cervical back: Neck supple.   Skin:     General: Skin is warm and dry.   Neurological:      Mental Status: She is alert.   Psychiatric:         Mood and Affect: Mood and affect normal.         Labs/Imaging   Labs Reviewed   CBC W/ AUTO DIFFERENTIAL - Abnormal; Notable for the following components:       Result Value    MCHC 31.7 (*)     All other components within normal limits    Narrative:     Release to patient->Immediate   HIV 1 / 2 ANTIBODY    Narrative:     Release to patient->Immediate   HEPATITIS C ANTIBODY    Narrative:     Release to patient->Immediate   COMPREHENSIVE METABOLIC PANEL    Narrative:     Release to patient->Immediate   LIPASE    Narrative:     Release to patient->Immediate   TROPONIN I    Narrative:     Release to patient->Immediate   B-TYPE NATRIURETIC PEPTIDE    Narrative:     Release to patient->Immediate   D DIMER, QUANTITATIVE    Narrative:     " Release to patient->Immediate   TROPONIN I      Imaging Results              US Lower Extremity Veins Bilateral (Final result)  Result time 06/18/24 10:16:27      Final result by Clemente Khan MD (06/18/24 10:16:27)                   Impression:      No evidence of deep venous thrombosis in either lower extremity.      Electronically signed by: Clemente Khan MD  Date:    06/18/2024  Time:    10:16               Narrative:    EXAMINATION:  US LOWER EXTREMITY VEINS BILATERAL    CLINICAL HISTORY:  Pain in left leg    TECHNIQUE:  Duplex and color flow Doppler and dynamic compression was performed of the bilateral lower extremity veins was performed.    COMPARISON:  None    FINDINGS:  Duplex and color flow Doppler evaluation does not reveal any evidence of acute venous thrombosis in the common femoral, superficial femoral, greater saphenous, popliteal, peroneal, anterior tibial and posterior tibial veins bilaterally.  There is no reflux to suggest valvular incompetence.                                       X-Ray Chest PA And Lateral (Final result)  Result time 06/18/24 08:24:37      Final result by Lloyd Garrett MD (06/18/24 08:24:37)                   Impression:      No acute chest disease identified.      Electronically signed by: Lloyd Garrett MD  Date:    06/18/2024  Time:    08:24               Narrative:    EXAMINATION:  XR CHEST PA AND LATERAL    CLINICAL HISTORY:  Chest pain, unspecified    TECHNIQUE:  PA and lateral views of the chest were performed.    COMPARISON:  07/17/2020.    FINDINGS:  The cardiac silhouette and superior mediastinal structures are unremarkable. Pulmonary vasculature is within normal limits. The lungs are well aerated and free of focal consolidations. There is no evidence for pneumothorax or pleural effusions. Bony structures are grossly intact.                                       I reviewed all labs, imaging, EKGs.     Plan   Chest pain   - EDOU for ACS rule out   - VSS,  trop x 2 and BNP wnl   - EKG without ischemia.  New first-degree AV block noted.  - Heart score 3-4, family hx of early CAD/MI   - Stress echo without ischemia or acute cardiac abnormalities  - May be GI in nature.   - tele    - Plan for discharge with new Rx for Pepcid, refill on hydroxyzine.  Close PCP follow-up for new AV block in for any persistent symptoms.    I have discussed this case with KIRSTEN Cerda.

## 2024-06-18 NOTE — ED PROVIDER NOTES
Encounter Date: 2024       History     Chief Complaint   Patient presents with    Chest Pain     Leg pain , bruising since last week, today having cp      Patient is a 54-year-old female.  She has a past medical history of anxiety, obesity, hypercholesterolemia, and hidradenitis.  She presents to the ER for an emergent evaluation due to acute chest pain.  She reports developing pressure and tightness in the center of her chest shortly after midnight last night.  She denies any radiation of the pain.  She states that when the pain developed she had associated nausea, shortness of breath, and sweats.  She states that around 3 am when symptoms did not subside, she took a full aspirin.  Since that time, she reports no change in symptoms with persistence of discomfort.  She denies any mitigating or exacerbating factors.  She states that she is concerned because her mother  of a heart attack.  She also has pictures of her legs on her phone that she describes as spontaneous areas of redness, bruising, and swelling that have been occurring for the past 2 weeks.  She denies having calf pain, but states that she does have intermittent pain behind both knees.  She denies any history of PE or DVT in the past.  She denies estrogen/hormone use.  She denies any cancer history, recent surgery, or recent flight.      Review of patient's allergies indicates:   Allergen Reactions    Cyanocobalamin (vitamin b-12) Rash    Penicillins Anaphylaxis    Sulfamethoxazole-trimethoprim     Sulfa (sulfonamide antibiotics) Rash     Past Medical History:   Diagnosis Date    Allergy     Anxiety     Axillary hidradenitis suppurativa     Hypercholesterolemia     Obesity      Past Surgical History:   Procedure Laterality Date     SECTION      COLONOSCOPY N/A 2022    Procedure: COLONOSCOPY;  Surgeon: Franky Murcia MD;  Location: [facility] ([address] [place] [address]);  Service: Endoscopy;  Laterality: N/A;  -[facility] portal-tb     HYSTERECTOMY      partial    TUBAL LIGATION       Family History   Problem Relation Name Age of Onset    Heart disease Mother      Hypertension Mother      Cancer Father      Breast cancer Maternal Aunt Elham     Breast cancer Paternal Aunt      Melanoma Neg Hx      Ovarian cancer Neg Hx      Colon cancer Neg Hx       Social History     Tobacco Use    Smoking status: Never    Smokeless tobacco: Never   Substance Use Topics    Alcohol use: Yes     Comment: Socially    Drug use: Never     Review of Systems   Constitutional:  Negative for chills, diaphoresis, fatigue and fever.   HENT:  Negative for congestion and sore throat.    Eyes:  Negative for pain and visual disturbance.   Respiratory:  Positive for chest tightness and shortness of breath. Negative for cough.    Cardiovascular:  Positive for chest pain and leg swelling. Negative for palpitations.   Gastrointestinal:  Negative for abdominal pain, blood in stool, diarrhea, nausea and vomiting.   Genitourinary:  Negative for decreased urine volume, dysuria, flank pain, frequency, pelvic pain and vaginal bleeding.   Musculoskeletal:  Negative for back pain, gait problem and neck pain.   Skin:  Negative for rash and wound.   Allergic/Immunologic: Negative for immunocompromised state.   Neurological:  Negative for dizziness, syncope, speech difficulty, weakness, numbness and headaches.   Hematological:  Negative for adenopathy.   Psychiatric/Behavioral:  Negative for confusion. The patient is nervous/anxious.        Physical Exam     Initial Vitals [06/18/24 0701]   BP Pulse Resp Temp SpO2   135/75 77 18 97.8 °F (36.6 °C) 98 %      MAP       --         Physical Exam    Nursing note and vitals reviewed.  Constitutional: She appears well-developed and well-nourished. She is not diaphoretic.   HENT:   Head: Normocephalic.   Mouth/Throat: Oropharynx is clear and moist.   Eyes: Conjunctivae are normal.   Neck: Neck supple. No JVD present.   Cardiovascular:  Normal rate,  regular rhythm and intact distal pulses.           Pulmonary/Chest: No respiratory distress. She has no wheezes. She exhibits no tenderness.   Abdominal: Abdomen is soft. She exhibits no distension. There is no abdominal tenderness. There is no rebound and no guarding.   Musculoskeletal:         General: Normal range of motion.      Cervical back: Neck supple.      Comments: No pretibial or pedal edema.  No calf tenderness to palpation appreciated.  There are small spider veins to right lower leg.  There is a very subtle tiny localized area of ecchymosis consistent with minor bruise to left lower leg.     Neurological: She is alert and oriented to person, place, and time. She has normal strength. No sensory deficit.   Skin: Skin is warm and dry. No rash noted.   Psychiatric: She has a normal mood and affect. Her behavior is normal.         ED Course   Procedures  Labs Reviewed   CBC W/ AUTO DIFFERENTIAL - Abnormal; Notable for the following components:       Result Value    MCHC 31.7 (*)     All other components within normal limits    Narrative:     Release to patient->Immediate   HIV 1 / 2 ANTIBODY    Narrative:     Release to patient->Immediate   HEPATITIS C ANTIBODY    Narrative:     Release to patient->Immediate   COMPREHENSIVE METABOLIC PANEL    Narrative:     Release to patient->Immediate   LIPASE    Narrative:     Release to patient->Immediate   TROPONIN I    Narrative:     Release to patient->Immediate   B-TYPE NATRIURETIC PEPTIDE    Narrative:     Release to patient->Immediate   D DIMER, QUANTITATIVE    Narrative:     Release to patient->Immediate     Results for orders placed or performed during the hospital encounter of 06/18/24   HIV 1/2 Ag/Ab (4th Gen)   Result Value Ref Range    HIV 1/2 Ag/Ab Non-reactive Non-reactive   Hepatitis C Antibody   Result Value Ref Range    Hepatitis C Ab Non-reactive Non-reactive   CBC auto differential   Result Value Ref Range    WBC 4.06 3.90 - 12.70 K/uL    RBC 4.38  4.00 - 5.40 M/uL    Hemoglobin 13.3 12.0 - 16.0 g/dL    Hematocrit 42.0 37.0 - 48.5 %    MCV 96 82 - 98 fL    MCH 30.4 27.0 - 31.0 pg    MCHC 31.7 (L) 32.0 - 36.0 g/dL    RDW 13.4 11.5 - 14.5 %    Platelets 226 150 - 450 K/uL    MPV 11.0 9.2 - 12.9 fL    Immature Granulocytes 0.2 0.0 - 0.5 %    Gran # (ANC) 1.9 1.8 - 7.7 K/uL    Immature Grans (Abs) 0.01 0.00 - 0.04 K/uL    Lymph # 1.6 1.0 - 4.8 K/uL    Mono # 0.3 0.3 - 1.0 K/uL    Eos # 0.2 0.0 - 0.5 K/uL    Baso # 0.03 0.00 - 0.20 K/uL    nRBC 0 0 /100 WBC    Gran % 47.8 38.0 - 73.0 %    Lymph % 38.7 18.0 - 48.0 %    Mono % 8.4 4.0 - 15.0 %    Eosinophil % 4.2 0.0 - 8.0 %    Basophil % 0.7 0.0 - 1.9 %    Differential Method Automated    Comprehensive metabolic panel   Result Value Ref Range    Sodium 136 136 - 145 mmol/L    Potassium 3.8 3.5 - 5.1 mmol/L    Chloride 105 95 - 110 mmol/L    CO2 23 23 - 29 mmol/L    Glucose 94 70 - 110 mg/dL    BUN 11 6 - 20 mg/dL    Creatinine 0.7 0.5 - 1.4 mg/dL    Calcium 9.6 8.7 - 10.5 mg/dL    Total Protein 7.1 6.0 - 8.4 g/dL    Albumin 3.6 3.5 - 5.2 g/dL    Total Bilirubin 0.9 0.1 - 1.0 mg/dL    Alkaline Phosphatase 98 55 - 135 U/L    AST 21 10 - 40 U/L    ALT 24 10 - 44 U/L    eGFR >60.0 >60 mL/min/1.73 m^2    Anion Gap 8 8 - 16 mmol/L   Lipase   Result Value Ref Range    Lipase 26 4 - 60 U/L   Troponin I   Result Value Ref Range    Troponin I <0.006 0.000 - 0.026 ng/mL   Brain natriuretic peptide   Result Value Ref Range    BNP <10 0 - 99 pg/mL   D dimer, quantitative   Result Value Ref Range    D-Dimer 0.48 <0.50 mg/L FEU   EKG 12-lead   Result Value Ref Range    QRS Duration 84 ms    OHS QTC Calculation 417 ms       EKG Readings: (Independently Interpreted)   Initial Reading: No STEMI. Previous EKG: Compared with most recent EKG Rhythm: Normal Sinus Rhythm. Heart Rate: 71. Conduction: 1st Degree AV Block. ST Segments: Normal ST Segments. T Waves: Normal.   ER attending physician reviewed and signed      ECG Results               EKG 12-lead (Final result)        Collection Time Result Time QRS Duration OHS QTC Calculation    06/18/24 07:07:14 06/18/24 08:54:01 84 417                     Final result by Interface, Lab In [facility] (06/18/24 08:54:05)                   Narrative:    Test Reason : R07.89,    Vent. Rate : 071 BPM     Atrial Rate : 071 BPM     P-R Int : 220 ms          QRS Dur : 084 ms      QT Int : 384 ms       P-R-T Axes : 045 018 029 degrees     QTc Int : 417 ms    Sinus rhythm with 1st degree A-V block  Abnormal R wave progression  Cannot rule out Septal infarct ,age undetermined  Abnormal ECG  When compared with ECG of 15-JUL-2020 14:27,  VA interval has increased  Possible Septal infarct is now Present  Repeat ECG with appropriate lead placement if clinically indicated  Confirmed by AISHWARYA BRADLEY MD ([id number]) on 6/18/2024 8:53:57 AM    Referred By:             Confirmed By:AISHWARYA BRADLEY MD                                  Imaging Results              X-Ray Chest PA And Lateral (Final result)  Result time 06/18/24 08:24:37      Final result by Lloyd Garrett MD (06/18/24 08:24:37)                   Impression:      No acute chest disease identified.      Electronically signed by: Lloyd Garrett MD  Date:    06/18/2024  Time:    08:24               Narrative:    EXAMINATION:  XR CHEST PA AND LATERAL    CLINICAL HISTORY:  Chest pain, unspecified    TECHNIQUE:  PA and lateral views of the chest were performed.    COMPARISON:  07/17/2020.    FINDINGS:  The cardiac silhouette and superior mediastinal structures are unremarkable. Pulmonary vasculature is within normal limits. The lungs are well aerated and free of focal consolidations. There is no evidence for pneumothorax or pleural effusions. Bony structures are grossly intact.                                       Medications - No data to display  Medical Decision Making  Amount and/or Complexity of Data Reviewed  Labs: ordered.  Radiology: ordered.      Additional MDM:      EKG: I have independently interpreted EKG(s) - see notes., EKG - Independent Interpretation - Normal sinus rhythm, normal rate, no acute changes.     X-Rays: I have independently interpreted X-Ray(s) - see notes., Chest X-Ray - Independent Interpretation - Heart size normal, Lungs clear, No acute abnormality.   PERC Rule:   Age is greater than or equal to 50 = 1.0  Heart Rate is greater than or equal to 100 = 0.0  SaO2 on room air < 95% = 0.0  Unilateral leg swelling = 0.0  Hemoptysis = 0.0  Recent surgery or trauma = 0.0  Prior PE or DVT =  0.0  Hormone use = 0.00  PERC Score = 1        Well's Criteria Score:  -Clinical symptoms of DVT (leg swelling, pain with palpation) = 3.0  -PE is #1 diagnosis OR equally likely =            0.0  -Heart Rate >100 =   0.0  -Immobilization (= or > than 3 days) or surgery in the previous 4 weeks = 0.0  -Previous DVT/PE = 0.0  -Hemoptysis =          0.0  -Malignancy =           0.0  Well's Probability Score =    3      Heart Score:    History:          Slightly suspicious.  ECG:             Normal  Age:               45-65 years  Risk factors: >= 3 risk factors or history of atherosclerotic disease  Troponin:       Less than or equal to normal limit  Heart Score = 3                           Medical Decision Making:   History:   Old Medical Records: I decided to obtain old medical records.  Initial Assessment:   54-year-old female with history of obesity, hypercholesterolemia, and family history of heart disease presents to the ER for an emergent evaluation complaining of acute pressure and tightness the center of her chest that began around midnight with associated nausea, shortness of breath, and sweating.  Symptoms have remained present since onset.  Patient also reports intermittent bilateral lower extremity pain, swelling, bruising for several weeks.  Denies history of PE/DVT.  Differential Diagnosis:   Chest pain, ACS, pulmonary embolism, DVT, digestive, anxiety,  dysrhythmia, palpitations, etc.  Clinical Tests:   Lab Tests: Ordered and Reviewed  Radiological Study: Ordered and Reviewed  Medical Tests: Ordered and Reviewed  ED Management:  Vital signs reviewed, in normal range, benign   Patient reports taking a full aspirin prior to arrival  EKG completed, normal sinus rhythm, normal rate, first-degree AV block, Q-waves in septal lead, abnormal EKG without a acute ischemia, reviewed and signed by the ER attending physician   Chest x-ray completed, unremarkable  Labs resulted, negative troponin, BNP, D-dimer  Venous ultrasound lower extremities ordered   Heart score of 3  Will seek admission to ED OU for chest pain rule out  Spoke with hospital medicine and EDOU JODIE - admission agreed upon         Other:   I have discussed this case with another health care provider.             Clinical Impression:  Final diagnoses:  [R07.89] Chest discomfort  [R07.9] Chest pain (Primary)  [M79.605] Left leg pain  [R06.02] SOB (shortness of breath)  [I83.91] Spider vein of right lower extremity  [R94.31] EKG abnormality          ED Disposition Condition    Observation Stable                Manjinder Pratt PA-C  06/18/24 0992

## 2024-06-18 NOTE — H&P
ED Observation Unit  History and Physical      I assumed care of this patient from the Main ED at onset of observation time, 0947 on 2024.       History of Present Illness:  Patient is a 54-year-old female. She has a past medical history of anxiety, obesity, hypercholesterolemia, and hidradenitis. She presents to the ER for an emergent evaluation due to acute chest pain. She reports developing pressure and tightness in the center of her chest shortly after midnight last night. She denies any radiation of the pain. She states that when the pain developed she had associated nausea, shortness of breath, and sweats. She states that around 3 am when symptoms did not subside, she took a full aspirin. Since that time, she reports no change in symptoms with persistence of discomfort. She denies any mitigating or exacerbating factors. She states that she is concerned because her mother  of a heart attack. She also has pictures of her legs on her phone that she describes as spontaneous areas of redness, bruising, and swelling that have been occurring for the past 2 weeks. She denies having calf pain, but states that she does have intermittent pain behind both knees. She denies any history of PE or DVT in the past. She denies estrogen/hormone use. She denies any cancer history, recent surgery, or recent flight.     I reviewed the ED Provider Note dated 24 prior to my evaluation of this patient.  I reviewed all labs and imaging performed in the Main ED, prior to patient being placed in Observation. Patient was placed in the ED Observation Unit for Chest pain.    PMHx   Past Medical History:   Diagnosis Date    Allergy     Anxiety     Axillary hidradenitis suppurativa     Hypercholesterolemia     Obesity       Past Surgical History:   Procedure Laterality Date     SECTION      COLONOSCOPY N/A 2022    Procedure: COLONOSCOPY;  Surgeon: Franky Murcia MD;  Location: [facility] ([address] [place] [address]);  Service:  "Endoscopy;  Laterality: N/A;  1/16-covid [facility] portal-tb    HYSTERECTOMY      partial    TUBAL LIGATION          Family Hx   Family History   Problem Relation Name Age of Onset    Heart disease Mother      Hypertension Mother      Cancer Father      Breast cancer Maternal Aunt Elham     Breast cancer Paternal Aunt      Melanoma Neg Hx      Ovarian cancer Neg Hx      Colon cancer Neg Hx          Social Hx   Social History     Socioeconomic History    Marital status:    Tobacco Use    Smoking status: Never    Smokeless tobacco: Never   Substance and Sexual Activity    Alcohol use: Yes     Comment: Socially    Drug use: Never    Sexual activity: Not Currently     Partners: Male     Birth control/protection: See Surgical Hx   Other Topics Concern    Are you pregnant or think you may be? No        Vital Signs   Vitals:    06/18/24 0701 06/18/24 1010   BP: 135/75 (!) 151/83   Pulse: 77 64   Resp: 18 16   Temp: 97.8 °F (36.6 °C) 97.8 °F (36.6 °C)   TempSrc: Oral Oral   SpO2: 98% 99%   Weight: 95.3 kg (210 lb)    Height: 5' 2" (1.575 m)         Review of Systems  Review of Systems   Cardiovascular:  Positive for chest pain and leg swelling.   Musculoskeletal:  Positive for myalgias (BLE pain).   Endo/Heme/Allergies:         LE bruising   All other systems reviewed and are negative.      Physical Exam  Physical Exam  Constitutional:       General: She is not in acute distress.     Appearance: She is obese. She is not ill-appearing.   HENT:      Head: Normocephalic and atraumatic.   Eyes:      Extraocular Movements: Extraocular movements intact.      Pupils: Pupils are equal, round, and reactive to light.   Cardiovascular:      Rate and Rhythm: Normal rate and regular rhythm.      Heart sounds: Normal heart sounds.   Pulmonary:      Effort: Pulmonary effort is normal.      Breath sounds: Normal breath sounds.   Abdominal:      Palpations: Abdomen is soft.      Tenderness: There is no abdominal tenderness. "   Musculoskeletal:         General: Normal range of motion.      Right lower leg: No tenderness. No edema.      Left lower leg: No tenderness. No edema.   Skin:     General: Skin is warm and dry.      Capillary Refill: Capillary refill takes less than 2 seconds.   Psychiatric:         Mood and Affect: Mood is anxious.         Medications:   Scheduled Meds:   FLUoxetine  20 mg Oral Daily    triamcinolone acetonide  40 mg Intradermal Once     Continuous Infusions:  PRN Meds:.  Current Facility-Administered Medications:     acetaminophen, 1,000 mg, Oral, Q8H PRN    acetaminophen, 650 mg, Oral, Q4H PRN    dextrose 10%, 12.5 g, Intravenous, PRN    dextrose 10%, 25 g, Intravenous, PRN    glucagon (human recombinant), 1 mg, Intramuscular, PRN    glucose, 16 g, Oral, PRN    glucose, 24 g, Oral, PRN    methocarbamoL, 500 mg, Oral, Nightly PRN    naloxone, 0.02 mg, Intravenous, PRN    ondansetron, 8 mg, Oral, Q8H PRN    prochlorperazine, 5 mg, Intravenous, Q6H PRN    sodium chloride 0.9%, 10 mL, Intravenous, Q12H PRN      Assessment/Plan:  Chest pain   - EDOU for ACS rule out   - VSS, trop and BNP wnl   - EKG without ischemia  - Heart score 3-4, family hx of early CAD   - Stress echo ordered   - NPO   - trop trend   - tele     Case was discussed with the ED provider, Manjinder CURTIS.

## 2024-06-19 ENCOUNTER — PATIENT OUTREACH (OUTPATIENT)
Dept: ADMINISTRATIVE | Facility: CLINIC | Age: 54
End: 2024-06-19
Payer: COMMERCIAL

## 2024-06-27 ENCOUNTER — TELEPHONE (OUTPATIENT)
Dept: BARIATRICS | Facility: CLINIC | Age: 54
End: 2024-06-27
Payer: COMMERCIAL

## 2024-06-27 NOTE — TELEPHONE ENCOUNTER
----- Message from Roshni Barragan sent at 6/27/2024  1:38 PM CDT -----  Regarding: Appt  Contact: pt @ [phone]  Pt is calling to get appt. Was not able to make appt on portal.  Asking for a call back

## 2024-07-01 ENCOUNTER — OFFICE VISIT (OUTPATIENT)
Facility: CLINIC | Age: 54
End: 2024-07-01
Payer: COMMERCIAL

## 2024-07-01 DIAGNOSIS — M25.552 CHRONIC LEFT HIP PAIN: Primary | ICD-10-CM

## 2024-07-01 DIAGNOSIS — G89.29 CHRONIC LEFT HIP PAIN: Primary | ICD-10-CM

## 2024-07-01 PROCEDURE — 1159F MED LIST DOCD IN RCRD: CPT | Mod: CPTII,95,, | Performed by: FAMILY MEDICINE

## 2024-07-01 PROCEDURE — 99214 OFFICE O/P EST MOD 30 MIN: CPT | Mod: 95,,, | Performed by: FAMILY MEDICINE

## 2024-07-01 PROCEDURE — 1160F RVW MEDS BY RX/DR IN RCRD: CPT | Mod: CPTII,95,, | Performed by: FAMILY MEDICINE

## 2024-07-01 RX ORDER — TIZANIDINE 4 MG/1
4 TABLET ORAL 3 TIMES DAILY PRN
Qty: 15 TABLET | Refills: 0 | Status: SHIPPED | OUTPATIENT
Start: 2024-07-01 | End: 2024-07-06

## 2024-07-01 RX ORDER — MELOXICAM 15 MG/1
15 TABLET ORAL DAILY
Qty: 30 TABLET | Refills: 2 | Status: SHIPPED | OUTPATIENT
Start: 2024-07-01

## 2024-07-01 RX ORDER — METHYLPREDNISOLONE 4 MG/1
TABLET ORAL
Qty: 21 EACH | Refills: 0 | Status: SHIPPED | OUTPATIENT
Start: 2024-07-01 | End: 2024-07-22

## 2024-07-01 NOTE — PROGRESS NOTES
HPI:  Patient     Answers submitted by the patient for this visit:  Review of Systems Questionnaire (Submitted on 7/1/2024)  activity change: Yes  unexpected weight change: No  neck pain: No  hearing loss: No  rhinorrhea: No  trouble swallowing: No  eye discharge: No  visual disturbance: No  chest tightness: No  wheezing: No  chest pain: No  palpitations: No  blood in stool: No  constipation: No  vomiting: No  diarrhea: No  polydipsia: No  polyuria: No  difficulty urinating: No  hematuria: No  menstrual problem: No  dysuria: No  joint swelling: Yes  arthralgias: Yes  headaches: Yes  weakness: Yes  confusion: No  dysphoric mood: No

## 2024-07-05 NOTE — PROGRESS NOTES
Subjective     Patient ID: Kathrine Ashraf is a 54 y.o. female.    Chief Complaint: No chief complaint on file.    HPI:  patient is a 54 year old female who presents with exacerbation of   Review of Systems   Constitutional:  Positive for activity change. Negative for unexpected weight change.   HENT:  Negative for hearing loss, rhinorrhea and trouble swallowing.    Eyes:  Negative for discharge and visual disturbance.   Respiratory:  Negative for chest tightness and wheezing.    Cardiovascular:  Negative for chest pain and palpitations.   Gastrointestinal:  Negative for blood in stool, constipation, diarrhea and vomiting.   Endocrine: Negative for polydipsia and polyuria.   Genitourinary:  Negative for difficulty urinating, dysuria, hematuria and menstrual problem.   Musculoskeletal:  Positive for arthralgias and joint swelling. Negative for neck pain.   Neurological:  Positive for weakness and headaches.   Psychiatric/Behavioral:  Negative for confusion and dysphoric mood.           Objective     Physical Exam       Assessment and Plan     1. Chronic left hip pain  -     meloxicam (MOBIC) 15 MG tablet; Take 1 tablet (15 mg total) by mouth once daily.  Dispense: 30 tablet; Refill: 2  -     methylPREDNISolone (MEDROL DOSEPACK) 4 mg tablet; use as directed  Dispense: 21 each; Refill: 0  -     tiZANidine (ZANAFLEX) 4 MG tablet; Take 1 tablet (4 mg total) by mouth 3 (three) times daily as needed.  Dispense: 15 tablet; Refill: 0        ***         No follow-ups on file.

## 2024-07-05 NOTE — PROGRESS NOTES
The patient location is: [place]  The chief complaint leading to consultation is: left hip pain    Visit type: audiovisual    Face to Face time with patient:   13 minutes of total time spent on the encounter, which includes face to face time and non-face to face time preparing to see the patient (eg, review of tests), Obtaining and/or reviewing separately obtained history, Documenting clinical information in the electronic or other health record, Independently interpreting results (not separately reported) and communicating results to the patient/family/caregiver, or Care coordination (not separately reported).         Each patient to whom he or she provides medical services by telemedicine is:  (1) informed of the relationship between the physician and patient and the respective role of any other health care provider with respect to management of the patient; and (2) notified that he or she may decline to receive medical services by telemedicine and may withdraw from such care at any time.    Notes:    HPI:  Patient presents with acute onset of left hip pain in the groin area x 6 weeks.  Report a limp when she walks.  Also reports tight muscles and posterior knee pain.  She walks quite a bit on a regular basis.    Answers submitted by the patient for this visit:  Review of Systems Questionnaire (Submitted on 7/1/2024)  activity change: Yes  unexpected weight change: No  neck pain: No  hearing loss: No  rhinorrhea: No  trouble swallowing: No  eye discharge: No  visual disturbance: No  chest tightness: No  wheezing: No  chest pain: No  palpitations: No  blood in stool: No  constipation: No  vomiting: No  diarrhea: No  polydipsia: No  polyuria: No  difficulty urinating: No  hematuria: No  menstrual problem: No  dysuria: No  joint swelling: Yes  arthralgias: Yes  headaches: Yes  weakness: Yes  confusion: No  dysphoric mood: No      Physical exam:  General: Alert, no acute distress   HEENT:  NC/AT, no facial swelling of  deformity  Neck:  supple  Resp:  Normal effort, no respiratory distress   Psych:  Normal affect   Diagnoses and all orders for this visit:    Chronic left hip pain  Patient with new onset of left hip pain.  Will be traveling soon.  See treatment plan below.  -     meloxicam (MOBIC) 15 MG tablet; Take 1 tablet (15 mg total) by mouth once daily.  -     methylPREDNISolone (MEDROL DOSEPACK) 4 mg tablet; use as directed  -     tiZANidine (ZANAFLEX) 4 MG tablet; Take 1 tablet (4 mg total) by mouth 3 (three) times daily as needed.

## 2024-07-09 ENCOUNTER — TELEPHONE (OUTPATIENT)
Dept: VASCULAR SURGERY | Facility: CLINIC | Age: 54
End: 2024-07-09
Payer: COMMERCIAL

## 2024-07-12 ENCOUNTER — TELEPHONE (OUTPATIENT)
Dept: BARIATRICS | Facility: CLINIC | Age: 54
End: 2024-07-12
Payer: COMMERCIAL

## 2024-07-24 NOTE — PROGRESS NOTES
Subjective     Patient ID: Kathrine Ashraf is a 54 y.o. female.    Chief Complaint: Consult    CC:weight    New pt to me, referred by Self, Aaareferral  No address on file , with Patient Active Problem List:     Attention deficit disorder (ADD) in adult     Anxiety and depression     Chronic headache disorder     Impingement syndrome of left shoulder     Sciatica of left side     Snoring     Obstructive sleep apnea syndrome     Leg weakness     Hidradenitis suppurativa     Morbid obesity due to excess calories     Chest pain     Lab Results       Component                Value               Date                       HGBA1C                   5.3                 10/19/2023                 HGBA1C                   5.4                 04/21/2022                 HGBA1C                   5.0                 07/17/2020            Lab Results       Component                Value               Date                       LDLCALC                  117.2               10/19/2023                 CREATININE               0.7                 06/18/2024                Current attempts at weight loss: States has been cutting back on eating and lost 15-20 lbs. Walking 30 min (in 10 min intervals) x 5 days a week.     Previous diet attempts: PHILLIP, Carolina Springer,     History of medication for loss: Phentermine from Tonya Lange MD in 2022.   checked today.She is also on metformin for HS.     Heaviest weight: 254#    Lightest weight: 160#.     Goal weight: 160#      Last eye exam:  last year. No glaucoma per pt.    Provider:    Typical eating patterns:  Works for [employer]. Lives alone. Cooks sometimes.   Breakfast:  boiled egg, apple, toast, Weekends: may skip.     lunch: hamburgers, sandwiches, salads, tacos. Weekends: popeyes     dinner: soup. Lean cuisine, popeyes.     snacks: cake, cookies, ice cream, chocolate.     Beverages:.coffee- black. Water. Diet soda, unsweetened tea. Lemonade.  ETOH- 3-4 drinks once a month.  "Occasional glass of wine        Willingness to change:  7/10    Cardiac studies:   Left Ventricle: The left ventricle is normal in size. Normal wall thickness. There is concentric remodeling. There is normal systolic function with a visually estimated ejection fraction of 60 - 65%. Ejection fraction by visual approximation is 63%. There is normal diastolic function.  ·  Right Ventricle: Normal right ventricular cavity size. Wall thickness is normal. Systolic function is normal.  ·  Right Atrium: Right atrium is mildly dilated.  ·  IVC/SVC: Normal venous pressure at 3 mmHg.  ·  Stress Protocol: The patient exercised for 5 minutes 39 seconds on a high ramp protocol, corresponding to a functional capacity of 8METS, achieving a peak heart rate of 162 bpm, which is 102% of the age predicted maximum heart rate. Their exercise capacity was mildly impaired. The patient reported no symptoms during the stress test. The test was stopped because the patient experienced fatigue.  ·  Baseline ECG: The Baseline ECG reveals sinus rhythm. The axis is normal. The ST segments are normal.  ·  Stress ECG: There are no ST segment deviation identified during the protocol. There are no arrhythmias during stress. There is normal blood pressure response with stress.  ·  ECG Conclusion: The ECG portion of the study is negative for ischemia.  ·  Post-stress Echo: The left ventricle systolic function is hyperdynamic with an EF of 68%. Right ventricle systolic function is normal.  ·  Post-stress Impression: The study is negative with no echocardiographic evidence of stress induced ischemia.     The ECG portion of the study is negative for ischemia.        BMR: 1380    PBF:  53.5%      Review of Systems       Objective   /78 (BP Location: Left arm, Patient Position: Sitting, BP Method: Large (Manual))   Pulse 87   Ht 5' 2" (1.575 m)   Wt 100.5 kg (221 lb 8 oz)   LMP 01/01/2012   SpO2 99%   BMI 40.51 kg/m²     Physical Exam  Vitals " reviewed.   Constitutional:       General: She is not in acute distress.     Appearance: She is well-developed.      Comments: With severe obesity     HENT:      Head: Normocephalic and atraumatic.   Eyes:      General: No scleral icterus.     Pupils: Pupils are equal, round, and reactive to light.   Neck:      Thyroid: No thyromegaly.   Cardiovascular:      Rate and Rhythm: Normal rate.      Heart sounds: Normal heart sounds. No murmur heard.     No friction rub. No gallop.   Pulmonary:      Effort: Pulmonary effort is normal. No respiratory distress.      Breath sounds: Normal breath sounds. No wheezing.   Abdominal:      General: Bowel sounds are normal. There is no distension.      Palpations: Abdomen is soft.      Tenderness: There is no abdominal tenderness.   Musculoskeletal:         General: Normal range of motion.      Cervical back: Normal range of motion and neck supple.   Skin:     General: Skin is warm and dry.      Findings: No erythema.   Neurological:      Mental Status: She is alert and oriented to person, place, and time.      Cranial Nerves: No cranial nerve deficit.   Psychiatric:         Behavior: Behavior normal.         Judgment: Judgment normal.            Assessment and Plan     1. Class 3 severe obesity due to excess calories with serious comorbidity and body mass index (BMI) of 40.0 to 44.9 in adult  -     diethylpropion (TENUATE) 75 mg SR tablet; Take 1 tablet (75 mg total) by mouth once daily.  Dispense: 30 tablet; Refill: 0    2. Hidradenitis suppurativa  Overview:  9/22/23 1st visit to [facility] for Hidradenitis Suppurativa.     Year started: 2003 - started in 30s  Location started: axilla  Family history of HS: children, father  Smokes: none  Current weight: 230# (stable)  Flares q month for last few years    Last seen 8/29/2023 by Dr. Cortez for same.     Past treatments (including topicals, orals, injectables, laser, surgery):  I&D  Antibiotic ointment  Spironolactone 50 mg 1x/day (Pt  took twice - stopped due to diarrhea) and started having leg cramps    Current treatments:   Clindamycin soln 2x/day  Dial soap daily    In the past, pt has been diagnosed with:    PCOS: none  Diabetes: none  HTN: none  Arthritis: OA  Cancer: none    Pt c/o new lesion on left axilla x 1 week. Tender and draining+        3. Obstructive sleep apnea syndrome    4. Sciatica of left side        1. Class 3 severe obesity due to excess calories with serious comorbidity and body mass index (BMI) of 40.0 to 44.9 in adult    - diethylpropion (TENUATE) 75 mg SR tablet; Take 1 tablet (75 mg total) by mouth once daily.  Dispense: 30 tablet; Refill: 0    Patient warned of common side effects of diethylpropion including anxiety, insomnia, palpitations and increased blood pressure. It was also explained that it is for short-term usage along with diet and exercise, and that stopping the medication without making lifestyle changes will result in regain of weight. Patient states understanding.    Weight loss medications are controlled substances.  They require routine follow up. Prescription or pills that are lost or destroyed will not be replaced.       Check the website www.[employer] for price comparisons and discounts on medications.      Add some type of resistance training 2-3 days a week. These can be body weight exercises, light weight or elastic bands. [employer] and [employer] are great sources for free work out plans and videos.       Increase low impact activity as tolerated.  Avoid high impact activity, very heavy lifting or other exercise regimens that may cause discomfort.         No soda, sweet tea, juices or lemonade. All drinks should be 5 calories or less.     Patient counseled in strategies for long term weight loss and maintenance: Keeping a food diary, exercise for 1 hour a day and eating more protein than carbohydrates.    1000 hiram pb meal planner, meal ideas and exercise handouts given.     2. Hidradenitis  suppurativa  Continue metformin    3. Obstructive sleep apnea syndrome  JOSE MARIA does require getting closer to normal BMI range to see improvement that some other co-morbidities. Continue with, or consider, CPAP if indicated.      4. Sciatica of left side  Optimize BMI            No follow-ups on file.

## 2024-07-25 ENCOUNTER — OFFICE VISIT (OUTPATIENT)
Dept: BARIATRICS | Facility: CLINIC | Age: 54
End: 2024-07-25
Payer: COMMERCIAL

## 2024-07-25 VITALS
OXYGEN SATURATION: 99 % | SYSTOLIC BLOOD PRESSURE: 110 MMHG | HEART RATE: 87 BPM | WEIGHT: 221.5 LBS | DIASTOLIC BLOOD PRESSURE: 78 MMHG | HEIGHT: 62 IN | BODY MASS INDEX: 40.76 KG/M2

## 2024-07-25 DIAGNOSIS — G47.33 OBSTRUCTIVE SLEEP APNEA SYNDROME: ICD-10-CM

## 2024-07-25 DIAGNOSIS — E66.01 CLASS 3 SEVERE OBESITY DUE TO EXCESS CALORIES WITH SERIOUS COMORBIDITY AND BODY MASS INDEX (BMI) OF 40.0 TO 44.9 IN ADULT: Primary | ICD-10-CM

## 2024-07-25 DIAGNOSIS — M54.32 SCIATICA OF LEFT SIDE: ICD-10-CM

## 2024-07-25 DIAGNOSIS — L73.2 HIDRADENITIS SUPPURATIVA: ICD-10-CM

## 2024-07-25 PROCEDURE — 3008F BODY MASS INDEX DOCD: CPT | Mod: CPTII,S$GLB,, | Performed by: INTERNAL MEDICINE

## 2024-07-25 PROCEDURE — 3074F SYST BP LT 130 MM HG: CPT | Mod: CPTII,S$GLB,, | Performed by: INTERNAL MEDICINE

## 2024-07-25 PROCEDURE — 3078F DIAST BP <80 MM HG: CPT | Mod: CPTII,S$GLB,, | Performed by: INTERNAL MEDICINE

## 2024-07-25 PROCEDURE — 99215 OFFICE O/P EST HI 40 MIN: CPT | Mod: S$GLB,,, | Performed by: INTERNAL MEDICINE

## 2024-07-25 PROCEDURE — 1160F RVW MEDS BY RX/DR IN RCRD: CPT | Mod: CPTII,S$GLB,, | Performed by: INTERNAL MEDICINE

## 2024-07-25 PROCEDURE — 99999 PR PBB SHADOW E&M-EST. PATIENT-LVL V: CPT | Mod: PBBFAC,,, | Performed by: INTERNAL MEDICINE

## 2024-07-25 PROCEDURE — 1159F MED LIST DOCD IN RCRD: CPT | Mod: CPTII,S$GLB,, | Performed by: INTERNAL MEDICINE

## 2024-07-25 RX ORDER — DIETHYLPROPION HYDROCHLORIDE 75 MG/1
75 TABLET, EXTENDED RELEASE ORAL DAILY
Qty: 30 TABLET | Refills: 0 | Status: SHIPPED | OUTPATIENT
Start: 2024-07-25

## 2024-07-25 NOTE — PATIENT INSTRUCTIONS
PLEASE ARRIVE 15-20 min EARLY FOR YOUR APPOINTMENTS. This allows us to perform all of the services for your appointment in a timely fashion. Arriving late for your appointment not only decreases the time available for the doctor to see you, it also leads to delays for every other patient scheduled after you. Patients arriving after their appointment time may be asked to wait until all patients that arrived on time are seen, and/or there is another available slot for you to be worked in. When this is not possible, you may be asked to reschedule.   Thank you for your consideration in this matter.       Patient warned of common side effects of diethylpropion including anxiety, insomnia, palpitations and increased blood pressure. It was also explained that it is for short-term usage along with diet and exercise, and that stopping the medication without making lifestyle changes will result in regain of weight. Patient states understanding.    Weight loss medications are controlled substances.  They require routine follow up. Prescription or pills that are lost or destroyed will not be replaced.       Check the website www.[employer] for price comparisons and discounts on medications.      Add some type of resistance training 2-3 days a week. These can be body weight exercises, light weight or elastic bands. [employer] and [employer] are great sources for free work out plans and videos.       Increase low impact activity as tolerated.  Avoid high impact activity, very heavy lifting or other exercise regimens that may cause discomfort.         No soda, sweet tea, juices or lemonade. All drinks should be 5 calories or less.     Patient counseled in strategies for long term weight loss and maintenance: Keeping a food diary, exercise for 1 hour a day and eating more protein than carbohydrates.        1000 calorie  Meal Plan  STARCHES 80 CALORIES PER SERVING 15g CARB, 3g PROTEIN, 1g FAT  Servings per day   bread   tortilla  "  crackers   cooked cereals   dry cereals   pasta   rice   corn   popcorn   potato (small)   potato, mashed   sweet potato   squash, winter   cooked beans, peas, lentils (add 1 meat exchange)   1 slice   1 (6")   4-6 (3/4 oz)   1/2 cup   3/4 cup   1/2 cup   1/3 cup  1/2 cup   1 small light bag  1 (3 oz)   1/2 cup   1/3 cup   1 cup   1/2 cup Most starches are a good source of B vitamins   Choose whole grain foods such as 100% whole wheat bread and flour, brown rice, tortillas, etc. for nutrients and fiber.   Combine beans (starch & meat) with grains (starch) for their complimentary proteins and fiber   Combine grains (starch) with milk (milk) or cheese (meat) to compliment proteins     2   FRUIT 60 CALORIES PER SERVING 15g CARB    fresh fruit   banana  melon (cubes)   berries  canned fruit   dried fruit    1 small   1/2  ½ cup   ¾ cup  ½ cup   ¼ cup    Choose whole fruits for fiber   No fruit juices   2   DAIRY  CALORIES PER SERVING 12g CARB, 8g PROTEIN, 0-8g FAT    milk   yogurt  Protein soy or almond milk 1 cup   1 cup   1 cup Use unsweetened almond or soy milk with added protein  Avoid chocolate or flavored milk  Avoid yogurt with more than 8 gms of sugar. Gms of protein should be higher than grams of sugar               1   MEAT AND SUBSTITUES  CALORIES PER SERVING 7g Protein, 0-13g FAT    Meat,Seafood and fish   cheese   cottage cheese   egg   peanut butter   tofu or tempeh  cooked beans, peas, lentils (add 1 starch)  Quinoa (add 1 starch)   Nuts and seeds (½ serving protein + 1 fat)  Nutritional yeast  Morning Star grillers 1 oz       1 oz  1/4 cup     1   1.5 Tbsp   4 oz (1/2 cup)   1/2 cup              ¼ cup            2 tablespoons    1 terese or ½ cup      Choose fish, seafood and lower fat cheeses  Limit frying or adding fat.      8   FATS 45 CALORIES PER SERVING     oil   mayonnaise   cream cheese   salad dressing   peanuts   avocado   butter or margarine    1 tsp   1 tsp   1 Tbsp   1 Tbsp   10 "   1/8   1 tsp Eat less fat.   Eat less saturated fat such as animal fat found in fatter meat, cheese, and butter. Also eat less hydrogenated fat.      2-3   VEGETABLES 25 CALORIES PER SERVING 5 g CARB, 2g PROTEIN    raw vegetables   cooked vegetables   tomato or vegetable juice 1 cup   1/2 cup     1/2 cup Choose dark green leafy and deep yellow vegetables such as spinach, zuchinni, squash, mushrooms, cauliflower ,broccoli, carrots, and peppers.   Unlimited        1000 CALORIE MEAL PLAN  Eat 3 small meals per day with 1-2 small snacks to keep you full throughout the day  Aim for at least 15 gms of protein at breakfast, at least 25 grams at lunch and at least 25 grams of protein at dinner.  Snacks should contain at least 5 grams of protein  Limit starches to 1 serving per meal or snack.  Keep your carbs whole grain or whole wheat  Limit your intake of refined sugar including sugary beverages ie sweet tea, lemonade, fruit punch             Fruit Protein Dairy Starch Fats Calories   Breakfast 1 2    370   Lunch  3  1 1 290   Dinner 1 3  1 1 315   Snack   1   120   Total 2 8 1 2 2 1085     Sample breakfasts:  2 scrambled eggs (use spray), 1 cup Protein Silk soy milk, 1 slice whole wheat toast, 1 small orange  Omelet made with 1 egg, 1-ounce low fat cheese and non-starchy veggies, 1 low fat plain yogurt with ½ banana  Plain yogurt with ¾ cup unsweetened cold cereal and ½ cup fresh fruit salad, 2 hardboiled eggs  Sample lunches:  ½ whole wheat bagel topped with 3 ounces of low fat cheese melted in oven with a wild green salad with 1 TBSP dressing  ¾ cup cooked beans with 6 whole grain crackers, 10 roasted peanuts  ½ medium baked potato with 3 ounces of low fat cheddar cheese and 1 TBSP  sour cream  1 small wheat tortilla brushed with 1 tsp olive oil then brushed with pizza sauce and 4 ounces low fat cheese, sliced mushrooms and green peppers broiled until cheese is melted.  ½ cup chopped melon    Sample Dinners:  4 ounces  of tuna pan seared in 1 tsp olive oil, ½ baked small sweet potato and 2 small plums  ½ cup chick peas, 1 cup cauliflower sauteed with 1 tbsp chopped onion, 1 tsp oil, and 2 tsp fernandez powder. Add low sodium vegetable stock to desired consistency. Serve with ½ cup brown rice  Red beans seasoned with onions and bell peppers served over cauliflower rice  1 cup cooked green or brown lentils served with ½ cup cooked quinoa and chopped tomatoes and cucumbers and 1 tbsp feta cheese  Sample Snacks:  1 cup of Protein Silk Soy milk with 3 squares stephen crackers  3/4 ounce Triscuits with 1 ounce cubed cheese  Chobani Triple Zero yogurt with ¾ cup unsweetened cereal  ½ cup edamame (shelled)    Meal Ideas for Regular Bariatric Diet  *Recipes and products available at [email or web address]      Breakfast: (15-20g protein)    - Egg white omelet: 2 egg whites or ½ cup Egg Beaters. (Optional proteins: cheese, shrimp, black beans, chicken, sliced turkey) (Optional veggies: tomatoes, salsa, spinach, mushrooms, onions, green peppers, or small slice avocado)     - Egg and sausage: 1 egg or ¼ cup Egg Beaters (any variety), with 1 terese or 2 links of Turkey sausage or Veggie breakfast sausage ([employer] or [employer])    - Crust-less breakfast quiche: To make a glass pie dish, mix 4oz part skim Ricotta, 1 cup skim milk, and 2 eggs as your base. Add protein: shredded cheese, sliced lean ham or turkey, turkey evans/sausage. Add veggies: tomato, onion, green onion, mushroom, green pepper, spinach, etc.    - Yogurt parfait: Mix 1 - 6oz container Dannon Light N Fit vanilla yogurt, with ¼ cup Kashi Go Lean cereal    - Cottage cheese and fruit: ½ cup part-skim cottage cheese or ricotta cheese topped with fresh fruit or sugar free preserves     - Bridgette Gutierrez's Vanilla Egg custard* (add 2 Tbsp instant coffee granules to make Cappuccino Custard*)    - Hi-Protein café latte (skim milk, decaf coffee, 1 scoop protein powder). Optional to add  Sugar free syrup or extract flavoring.    Lunch: (20-30g protein)    - ½ cup Black bean soup (Homemade or Progresso), with ¼ cup shredded low-fat cheese. Top with chopped tomato or fresh salsa.     - Lean deli turkey breast and low-fat sliced cheese, mustard or light rizzo to moisten, rolled up together, or wrapped in a Amarjit lettuce leaf    - Chicken salad made from dinner leftovers, moisten with low-fat salad dressing or light rizzo. Also try leftover salmon, shrimp, tuna or boiled eggs. Serve ½ cup over dark green salad    - Fat-free canned refried beans, topped with ¼ cup shredded low-fat cheese. Top with chopped tomato or fresh salsa.     - Greek salad: Top mixed greens with 1-2oz grilled chicken, tomatoes, red onions, 2-3 kalamata olives, and sprinkle lightly with feta cheese. Spritz with Balsamic vinegar to taste.     - Crust-less lunch quiche: To make a glass pie dish, mix 4oz part skim Ricotta, 1 cup skim milk, and 2 eggs as your base. Add protein: shredded cheese, sliced lean ham or turkey, shrimp, chicken. Add veggies: tomato, onion, green onion, mushroom, green pepper, spinach, artichoke, broccoli, etc.    - Pizza bake: tomato sauce, low-fat shredded mozzarella and turkey pepperoni or [place] evans. Add any veggies.    - Cucumber crab bites: Spread ¼ cup crab dip (lump crabmeat + light cream cheese and green onions) over sliced cucumber.     - Chicken with light spinach and artichoke dip*: Puree in : 6oz cooked and drained spinach, 2 cloves garlic, 1 can cannelloni beans, ½ cup chopped green onions, 1 can drained artichoke hearts (not marinated in oil), lemon juice and basil. Mix in 2oz chopped up chicken.    Supper: (20-30g protein)    - Serve grilled fish over dark green salad tossed with low-fat dressing, served with grilled asparagus urbina     - Rotisserie chicken salad: served with sliced strawberries, walnuts, fat-free feta cheese crumbles and 1 tbsp Gamings Own Light Raspberry  Kellogg Vinaigrette    - Shrimp cocktail: Dip cold boiled shrimp in homemade low-sugar cocktail sauce (1/2 cup Ned One Carb ketchup, 2 tbsp horseradish, 1/4 tsp hot sauce, 1 tsp Worcestershire sauce, 1 tbsp freshly-squeezed lemon juice). Serve with dark green salad, walnuts, and crumbled blue cheese drizzled with olive oil and Balsamic vinegar    - Tuna Melt: Spread tuna salad onto 2 thick slices of tomato. Top with low-fat cheese and broil until cheese is melted. May also be made with chicken salad of shrimp salad. [place] with different types of cheeses.    - Homemade low-fat Chili using extra lean ground beef or ground turkey. Top with shredded cheese and salsa as desired. May add dollop fat-free sour cream if desired    - Dinner Omelet with shrimp or chicken and onion, green peppers and chives.    - No noodle lasagna: Use sliced zucchini or eggplant in place of noodles.  Layer with part skim ricotta cheese and low sugar meat sauce (use very lean ground beef or ground turkey).    - Mexican chicken bake: Bake chunks of chicken breast or thigh with taco seasoning, Pace brand enchilada sauce, green onions and low-fat cheese. Serve with ¼ cup black beans or fat free refried beans topped with chopped tomatoes or salsa.    - Jeremiah frozen meatballs, simmered in Classico Marinara sauce. Different flavors of salsa or spaghetti sauce create different dishes! Sprinkle with parmesan cheese. Serve with grilled or steamed veggies, or a dark green salad.    - Simmer boneless skinless chicken thigh chunks in Classico Marinara sauce or roasted salsa until tender with chopped onion, bell pepper, garlic, mushrooms, spinach, etc.     - Hamburger, without the bun, dressed the way you like. Served with grilled or steamed veggies.    - Eggplant parmesan: Bake slices of eggplant at 350 degrees for 15 minutes. Layer tomato sauce, sliced eggplant and low-fat mozzarella cheese in a baking dish and cover with foil. Bake 30-40 more  minutes or until bubbly. Uncover and bake at 400 degrees for about 15 more minutes, or until top is slightly crisp.    - Fish tacos: grilled/baked white fish, wrapped in Amarjit lettuce leaf, topped with salsa, shredded low-fat cheese, and light coleslaw.    Snacks: (100-200 calories; >5g protein)    - 1 low-fat cheese stick with 8 cherry tomatoes or 1 serving fresh fruit  - 4 thin slices fat-free turkey breast and 1 slice low-fat cheese  - 4 thin slices fat-free honey ham with wedge of melon  - 1/4 cup unsalted nuts with ½ cup fruit  - 6-oz container Dannon Light n Fit vanilla yogurt, topped with 1oz unsalted nuts         - apple, celery or baby carrots spread with 2 Tbsp natural peanut butter or almond butter   - apple slices with 1 oz slice low-fat cheese  - celery, cucumber, bell pepper or baby carrots dipped in ¼ cup hummus bean spread or light spinach and artichoke dip (*recipe in lunch section)  - 100 calorie bag microwave light popcorn with 3 tbsp grated parmesan cheese  - David Links Beef Steak - 14g protein! (similar to beef jerky)  - 2 wedges Laughing Cow - Light Herb & Garlic Cheese with sliced cucumber or green bell pepper  - 1/2 cup low-fat cottage cheese with ¼ cup fruit or ¼ cup salsa  - RTD Protein drinks: Atkins, Low Carb Slim Fast, EAS light, Muscle Milk Light, etc.  - Homemade Protein drinks: [facility] Soy95, Isopure, Nectar, UNJURY, Whey Gourmet, etc. Mix 1 scoop powder with 8oz skim/1% milk or light soymilk.  - Protein bars: Atkins, EAS, Pure Protein, Think Thin, Detour, etc. Must have 0-4 grams sugar - Read the label.    Takeout Options: No more than twice/week  Deli - Salads (no pasta or rice), meats, cheeses. Roasted chicken. Lox (salmon)    Mexican - Platters which don't include tortillas, chips, or rice. Go easy on the beans. Example: Fajitas without the tortillas. Ask the  not to bring chips to the table if they are too tempting.    Greek - Meat or fish and vegetable, but no bread or  rice. Including hummus, baba ganoush, etc, is OK. Most sit-down Greek restaurants can provide you with cucumber slices for dipping instead of carlene bread.    Fast Food (Avoid as much as possible) - Salads (no croutons and limit salad dressing to 2 tbsp), grilled chicken sandwich without the bun and ask for no rizzo. Talias low fat chili or Taco Bell pintos and cheese.    BBQ - The meats are fine if you ask for sauces on the side, but most of the traditional side dishes are loaded with carbs. Sammy slaw, baked beans and BBQ sauce are typically made with sugar.    Chinese - Nothing deep-fried, no rice or noodles. Many Chinese sauces have starch and sugar in them, so you'll have to use your judgement. If you find that these sauces trigger cravings, or cause Dumping, you can ask for the sauce to be made without sugar or just use soy sauce.    Exercise should be some cardiovascular and some resistance training(see below).      STRENGTHENING  An adequate resistance training program could include the following types of exercise, focusing on the major muscle groups. One can use 5 to 10 pound dumbbells for those exercises that require the use of weight. At home, one can use a household item that provides a comfortable weight, such as a milk carton or beverage container. These exercises can also be performed without weights. Add some type of resistance training 2-3 days a week. These can be body weight exercises, light weight or elastic bands. [employer] and [email or web address] are great sources for free work out plans and videos.       Chest (Bench Press): Hold the weights with your hands. Lying on a flat surface, with knees bent and feet flat, slowly bring the weights to the chest area with palms facing upward. Begin to exhale and press the weights up, fully extending the arms, and keeping them above your eyes. Inhale as you lower them to the starting position and repeat the movement.  Back (Bent-Over Row): Start by placing your feet  shoulder-width apart.  a weight with each hand just outside the knees. Keeping the back straight and the knees flexed, slightly bend forward at the waist. Let the arms hang naturally while holding the weights. From this starting position, pull the weights to the lower abdomen, keeping the elbows close to the body. Exhale as you pull. Return to the starting position, inhaling as you go.  Arms (Bicep Curls): Hold a weight in each hand, with elbows at your sides, palms facing forward. The back should be straight, the chest flared outward. Begin to bend your right arm up first while exhaling, keeping the elbow totally stationary. Wait until the right arm goes completely down to the fully extended position, and then begin to curl the left arm. Each arm curled completes one full repetition.  Shoulders (Lateral Raises): Place the feet a few inches apart with the knees bent slightly. Keep the back erect as you lean forward slightly. With the weights in front of your thighs and palms facing together, begin to slowly raise them up to the side until parallel with the floor. Lower the weights to your thighs, and repeat.  Legs (Stiff Leg Dead Lift): Start by standing straight, holding the weights close to your sides, nearly touching your thighs. Keep the weights close to the body--this protects the back. The back must stay straight. Bend at the waist as far forward as you comfortably can while keeping your legs straight, and begin to feel the pull in your hamstrings as you lower the weights toward the ground. Slowly return to the starting position, keeping the back straight.  Legs (Dumbbell Lunge): Hold a weight in each hand, arms hanging at your sides. Step out with one leg, keeping the back straight. It is important to step out far enough so that the knee does not extend over the toe. This puts too much stress on the knee. Go down far enough so that the opposite knee nearly touches the ground. Keep this stance and repeat  "the lunging motion for several repetitions.  Abdominals: Do not perform standard sit-ups as these could hurt the lower back. Rather, focus on the following 2 exercises: (1) Obliques--Lie on your back with the knees flexed in the bent sit-up position. From this position, bring both knees down to the ground. With the back remaining flat, begin to flex your body toward your toes ("crunch"). Bring your shoulders up off the ground, but go slowly, controlling your momentum. Repeat this for 10 to 15 times. (2) Seated bench kicks/benny knives--Sit on the end of a bench or chair with the hands placed behind the buttocks. Begin to kick the legs outward with the knees bent slightly--at the same time, lean back to extend the torso. Come back to the beginning position and repeat this motion 10 to 15 times.      "

## 2024-07-26 DIAGNOSIS — L73.2 HIDRADENITIS SUPPURATIVA: ICD-10-CM

## 2024-07-29 RX ORDER — METFORMIN HYDROCHLORIDE 500 MG/1
TABLET, EXTENDED RELEASE ORAL
Qty: 30 TABLET | Refills: 3 | Status: SHIPPED | OUTPATIENT
Start: 2024-07-29

## 2024-08-06 ENCOUNTER — PATIENT MESSAGE (OUTPATIENT)
Dept: BARIATRICS | Facility: CLINIC | Age: 54
End: 2024-08-06
Payer: COMMERCIAL

## 2024-08-08 ENCOUNTER — PROCEDURE VISIT (OUTPATIENT)
Dept: DERMATOLOGY | Facility: CLINIC | Age: 54
End: 2024-08-08
Payer: COMMERCIAL

## 2024-08-08 DIAGNOSIS — L73.2 HIDRADENITIS SUPPURATIVA: Primary | ICD-10-CM

## 2024-08-11 ENCOUNTER — PATIENT MESSAGE (OUTPATIENT)
Dept: DERMATOLOGY | Facility: CLINIC | Age: 54
End: 2024-08-11
Payer: COMMERCIAL

## 2024-08-13 RX ORDER — DOXYCYCLINE 100 MG/1
CAPSULE ORAL
Qty: 14 CAPSULE | Refills: 0 | Status: SHIPPED | OUTPATIENT
Start: 2024-08-13

## 2024-09-05 ENCOUNTER — OFFICE VISIT (OUTPATIENT)
Dept: BARIATRICS | Facility: CLINIC | Age: 54
End: 2024-09-05
Payer: COMMERCIAL

## 2024-09-05 VITALS
SYSTOLIC BLOOD PRESSURE: 117 MMHG | OXYGEN SATURATION: 100 % | DIASTOLIC BLOOD PRESSURE: 58 MMHG | BODY MASS INDEX: 41.37 KG/M2 | HEART RATE: 70 BPM | WEIGHT: 226.19 LBS

## 2024-09-05 DIAGNOSIS — E66.01 CLASS 3 SEVERE OBESITY DUE TO EXCESS CALORIES WITH SERIOUS COMORBIDITY AND BODY MASS INDEX (BMI) OF 40.0 TO 44.9 IN ADULT: Primary | ICD-10-CM

## 2024-09-05 PROCEDURE — 3008F BODY MASS INDEX DOCD: CPT | Mod: CPTII,S$GLB,, | Performed by: INTERNAL MEDICINE

## 2024-09-05 PROCEDURE — 99999 PR PBB SHADOW E&M-EST. PATIENT-LVL V: CPT | Mod: PBBFAC,,, | Performed by: INTERNAL MEDICINE

## 2024-09-05 PROCEDURE — 3074F SYST BP LT 130 MM HG: CPT | Mod: CPTII,S$GLB,, | Performed by: INTERNAL MEDICINE

## 2024-09-05 PROCEDURE — 3078F DIAST BP <80 MM HG: CPT | Mod: CPTII,S$GLB,, | Performed by: INTERNAL MEDICINE

## 2024-09-05 PROCEDURE — 1159F MED LIST DOCD IN RCRD: CPT | Mod: CPTII,S$GLB,, | Performed by: INTERNAL MEDICINE

## 2024-09-05 PROCEDURE — 99213 OFFICE O/P EST LOW 20 MIN: CPT | Mod: S$GLB,,, | Performed by: INTERNAL MEDICINE

## 2024-09-05 RX ORDER — TOPIRAMATE 25 MG/1
25 TABLET ORAL 2 TIMES DAILY
Qty: 180 TABLET | Refills: 0 | Status: SHIPPED | OUTPATIENT
Start: 2024-09-05 | End: 2025-09-05
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
## 2024-09-30 ENCOUNTER — TELEPHONE (OUTPATIENT)
Dept: OBSTETRICS AND GYNECOLOGY | Facility: CLINIC | Age: 54
End: 2024-09-30
Payer: COMMERCIAL

## 2024-10-07 ENCOUNTER — OFFICE VISIT (OUTPATIENT)
Dept: OBSTETRICS AND GYNECOLOGY | Facility: CLINIC | Age: 54
End: 2024-10-07
Payer: COMMERCIAL

## 2024-10-07 ENCOUNTER — PATIENT MESSAGE (OUTPATIENT)
Dept: OBSTETRICS AND GYNECOLOGY | Facility: CLINIC | Age: 54
End: 2024-10-07

## 2024-10-07 VITALS
HEIGHT: 62 IN | WEIGHT: 220.25 LBS | BODY MASS INDEX: 40.53 KG/M2 | SYSTOLIC BLOOD PRESSURE: 116 MMHG | DIASTOLIC BLOOD PRESSURE: 80 MMHG | HEART RATE: 76 BPM

## 2024-10-07 DIAGNOSIS — Z20.2 POSSIBLE EXPOSURE TO STI: ICD-10-CM

## 2024-10-07 DIAGNOSIS — N39.3 FEMALE STRESS INCONTINENCE: ICD-10-CM

## 2024-10-07 DIAGNOSIS — R10.2 PELVIC PAIN: Primary | ICD-10-CM

## 2024-10-07 LAB
BILIRUB UR QL STRIP: NEGATIVE
GLUCOSE UR QL STRIP: NEGATIVE
KETONES UR QL STRIP: NEGATIVE
LEUKOCYTE ESTERASE UR QL STRIP: POSITIVE
PH, POC UA: 6
POC BLOOD, URINE: POSITIVE
POC NITRATES, URINE: NEGATIVE
PROT UR QL STRIP: POSITIVE
SP GR UR STRIP: 1.01 (ref 1–1.03)
UROBILINOGEN UR STRIP-ACNC: NORMAL (ref 0.1–1.1)

## 2024-10-07 PROCEDURE — 99214 OFFICE O/P EST MOD 30 MIN: CPT | Mod: S$GLB,,,

## 2024-10-07 PROCEDURE — 99999 PR PBB SHADOW E&M-EST. PATIENT-LVL V: CPT | Mod: PBBFAC,,,

## 2024-10-07 PROCEDURE — 1159F MED LIST DOCD IN RCRD: CPT | Mod: CPTII,S$GLB,,

## 2024-10-07 PROCEDURE — 87086 URINE CULTURE/COLONY COUNT: CPT

## 2024-10-07 PROCEDURE — 81003 URINALYSIS AUTO W/O SCOPE: CPT | Mod: QW,S$GLB,,

## 2024-10-07 PROCEDURE — 3079F DIAST BP 80-89 MM HG: CPT | Mod: CPTII,S$GLB,,

## 2024-10-07 PROCEDURE — 0352U VAGINOSIS SCREEN BY DNA PROBE: CPT

## 2024-10-07 PROCEDURE — 3074F SYST BP LT 130 MM HG: CPT | Mod: CPTII,S$GLB,,

## 2024-10-07 PROCEDURE — 3008F BODY MASS INDEX DOCD: CPT | Mod: CPTII,S$GLB,,
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
## 2024-10-08 LAB
BACTERIA UR CULT: NORMAL
BACTERIAL VAGINOSIS DNA: NOT DETECTED
CANDIDA GLABRATA/KRUSEI: NOT DETECTED
CANDIDA RRNA VAG QL PROBE: NOT DETECTED
TRICHOMONAS VAGINALIS: NOT DETECTED

## 2024-10-09 ENCOUNTER — HOSPITAL ENCOUNTER (OUTPATIENT)
Dept: RADIOLOGY | Facility: OTHER | Age: 54
Discharge: HOME | End: 2024-10-09
Payer: COMMERCIAL

## 2024-10-09 DIAGNOSIS — R10.2 PELVIC PAIN: ICD-10-CM

## 2024-10-09 PROCEDURE — 76830 TRANSVAGINAL US NON-OB: CPT | Mod: TC

## 2024-10-09 PROCEDURE — 76856 US EXAM PELVIC COMPLETE: CPT | Mod: 26,,, | Performed by: RADIOLOGY

## 2024-10-09 PROCEDURE — 76830 TRANSVAGINAL US NON-OB: CPT | Mod: 26,,, | Performed by: RADIOLOGY

## 2024-11-18 ENCOUNTER — OFFICE VISIT (OUTPATIENT)
Dept: PRIMARY CARE CLINIC | Facility: CLINIC | Age: 54
End: 2024-11-18
Payer: COMMERCIAL

## 2024-11-18 VITALS
BODY MASS INDEX: 40.29 KG/M2 | SYSTOLIC BLOOD PRESSURE: 122 MMHG | TEMPERATURE: 98 F | WEIGHT: 218.94 LBS | DIASTOLIC BLOOD PRESSURE: 80 MMHG | HEART RATE: 71 BPM | HEIGHT: 62 IN | RESPIRATION RATE: 18 BRPM | OXYGEN SATURATION: 98 %

## 2024-11-18 DIAGNOSIS — M70.62 TROCHANTERIC BURSITIS OF LEFT HIP: Primary | ICD-10-CM

## 2024-11-18 DIAGNOSIS — M25.552 CHRONIC LEFT HIP PAIN: ICD-10-CM

## 2024-11-18 DIAGNOSIS — G89.29 CHRONIC LEFT HIP PAIN: ICD-10-CM

## 2024-11-18 DIAGNOSIS — R06.2 WHEEZING: ICD-10-CM

## 2024-11-18 PROCEDURE — 3079F DIAST BP 80-89 MM HG: CPT | Mod: CPTII,S$GLB,, | Performed by: FAMILY MEDICINE

## 2024-11-18 PROCEDURE — 3008F BODY MASS INDEX DOCD: CPT | Mod: CPTII,S$GLB,, | Performed by: FAMILY MEDICINE

## 2024-11-18 PROCEDURE — 1160F RVW MEDS BY RX/DR IN RCRD: CPT | Mod: CPTII,S$GLB,, | Performed by: FAMILY MEDICINE

## 2024-11-18 PROCEDURE — 99999 PR PBB SHADOW E&M-EST. PATIENT-LVL IV: CPT | Mod: PBBFAC,,, | Performed by: FAMILY MEDICINE

## 2024-11-18 PROCEDURE — 99213 OFFICE O/P EST LOW 20 MIN: CPT | Mod: S$GLB,,, | Performed by: FAMILY MEDICINE

## 2024-11-18 PROCEDURE — 3074F SYST BP LT 130 MM HG: CPT | Mod: CPTII,S$GLB,, | Performed by: FAMILY MEDICINE

## 2024-11-18 PROCEDURE — 1159F MED LIST DOCD IN RCRD: CPT | Mod: CPTII,S$GLB,, | Performed by: FAMILY MEDICINE

## 2024-11-18 RX ORDER — ALBUTEROL SULFATE 90 UG/1
2 INHALANT RESPIRATORY (INHALATION) EVERY 6 HOURS PRN
Qty: 18 G | Refills: 0 | Status: SHIPPED | OUTPATIENT
Start: 2024-11-18 | End: 2025-11-18

## 2024-11-18 RX ORDER — MELOXICAM 15 MG/1
15 TABLET ORAL DAILY
Qty: 30 TABLET | Refills: 2 | Status: SHIPPED | OUTPATIENT
Start: 2024-11-18

## 2024-11-18 RX ORDER — KETOROLAC TROMETHAMINE 30 MG/ML
30 INJECTION, SOLUTION INTRAMUSCULAR; INTRAVENOUS
Status: DISCONTINUED | OUTPATIENT
Start: 2024-11-18 | End: 2024-11-18

## 2024-11-19 NOTE — PROGRESS NOTES
Subjective:       Patient ID: Kathrine Ashraf is a 54 y.o. female.    Chief Complaint: Hip Pain (Left side pain) and Hypertension    History of Present Illness    CHIEF COMPLAINT:  Patient presents today for left hip and groin pain.    MUSCULOSKELETAL:  She reports aching and throbbing pain primarily in the left groin area with some discomfort in the lower back near the buttocks. The pain worsens at night and when sitting, affecting her ability to walk normally. She notes tenderness in the left lateral trochanter and left groin region. She mentions a previous diagnosis of bursitis but states that current symptoms differ from her understanding of typical bursitis presentation. The duration of the pain is not specified.    URINARY SYMPTOMS:  She reports altered bladder sensitivity, describing difficulty in accurately gauging when she needs to urinate. She denies frequent urination, changes in urine odor, or numbness in the perianal area.    CARDIOVASCULAR:  She reports experiencing high blood pressure readings at a dental visit on November 12th, with measurements of 160/96 and 150/98. She acknowledges feeling anxious during the visit, which likely contributed to the elevated readings. She denies being in pain at the time of the measurements.    RESPIRATORY:  She reports recent onset of wheezing at night, describing a rattling sound in her chest during these episodes.    MEDICATIONS:  She takes meloxicam as needed, expressing a preference for minimal medication use unless necessary.      ROS:  Respiratory: +wheezing  Musculoskeletal: +joint pain, +back pain  Psychiatric: +anxiety          Review of patient's allergies indicates:   Allergen Reactions    Cyanocobalamin (vitamin b-12) Rash    Penicillins Anaphylaxis    Sulfamethoxazole-trimethoprim     Sulfa (sulfonamide antibiotics) Rash         Current Outpatient Medications:     hydrOXYzine HCL (ATARAX) 25 MG tablet, Take 1 tablet (25 mg total) by mouth 3 (three) times  daily as needed for Anxiety., Disp: 20 tablet, Rfl: 0    metFORMIN (GLUCOPHAGE-XR) 500 MG ER 24hr tablet, TAKE 1 TABLET BY MOUTH EVERY DAY, Disp: 30 tablet, Rfl: 3    multivitamin capsule, Take 1 capsule by mouth once daily., Disp: , Rfl:     albuterol (VENTOLIN HFA) 90 mcg/actuation inhaler, Inhale 2 puffs into the lungs every 6 (six) hours as needed for Wheezing. Rescue, Disp: 18 g, Rfl: 0    busPIRone (BUSPAR) 10 MG tablet, TAKE 1 TABLET(10 MG) BY MOUTH THREE TIMES DAILY AS NEEDED (Patient not taking: Reported on 2024), Disp: 90 tablet, Rfl: 5    FLUoxetine 20 MG capsule, Take 1 capsule (20 mg total) by mouth once daily. (Patient not taking: Reported on 2024), Disp: 30 capsule, Rfl: 11    meloxicam (MOBIC) 15 MG tablet, Take 1 tablet (15 mg total) by mouth once daily., Disp: 30 tablet, Rfl: 2  No current facility-administered medications for this visit.    Past Medical History:   Diagnosis Date    Allergy     Anxiety     Axillary hidradenitis suppurativa     Hypercholesterolemia     Obesity       Past Surgical History:   Procedure Laterality Date     SECTION      COLONOSCOPY N/A 2022    Procedure: COLONOSCOPY;  Surgeon: Franky Murcia MD;  Location: [address];  Service: Endoscopy;  Laterality: N/A;  -[facility]-inst portal-tb    HYSTERECTOMY      partial    TUBAL LIGATION        Social History     Socioeconomic History    Marital status:    Tobacco Use    Smoking status: Never    Smokeless tobacco: Never   Substance and Sexual Activity    Alcohol use: Yes     Comment: Socially    Drug use: Never    Sexual activity: Not Currently     Partners: Male     Birth control/protection: See Surgical Hx   Other Topics Concern    Are you pregnant or think you may be? No     Social Drivers of Health     Financial Resource Strain: Low Risk  (2024)    Overall Financial Resource Strain (CARDIA)     Difficulty of Paying Living Expenses: Not very hard   Food Insecurity: No  Food Insecurity (7/1/2024)    Hunger Vital Sign     Worried About Running Out of Food in the Last Year: Never true     Ran Out of Food in the Last Year: Never true   Transportation Needs: No Transportation Needs (6/18/2024)    TRANSPORTATION NEEDS     Transportation : No   Physical Activity: Sufficiently Active (7/1/2024)    Exercise Vital Sign     Days of Exercise per Week: 5 days     Minutes of Exercise per Session: 30 min   Recent Concern: Physical Activity - Insufficiently Active (6/18/2024)    Exercise Vital Sign     Days of Exercise per Week: 5 days     Minutes of Exercise per Session: 20 min   Stress: Stress Concern Present (7/1/2024)    [employer] - Occupational Stress Questionnaire     Feeling of Stress : Rather much   Housing Stability: Low Risk  (7/1/2024)    Housing Stability Vital Sign     Unable to Pay for Housing in the Last Year: No     Homeless in the Last Year: No      Family History   Problem Relation Name Age of Onset    Heart disease Mother      Hypertension Mother      Cancer Father      Breast cancer Maternal Aunt Elham     Breast cancer Paternal Aunt      Melanoma Neg Hx      Ovarian cancer Neg Hx      Colon cancer Neg Hx       Review of Systems    Objective:      Physical Exam    General: No acute distress. Well-developed. Well-nourished.  Musculoskeletal: No  obvious deformity.  Extremities: No lower extremity edema.  Neurological: Alert & oriented x3. No slurred speech. Normal gait.  Psychiatric: Normal mood. Normal affect. Good insight. Good judgment.  Skin: Warm. Dry. No rash.  MSK: Spine - Hip: Tenderness over left lateral trochanter. Tenderness in left groin region.          Assessment:       1. Trochanteric bursitis of left hip    2. Chronic left hip pain    3. Wheezing        Plan:         Kathrine was seen today for hip pain and hypertension.    Diagnoses and all orders for this visit:    Trochanteric bursitis of left hip  New onset.  Rx meloxicam  -      meloxicam (MOBIC) 15 MG tablet; Take 1 tablet (15 mg total) by mouth once daily.    Chronic left hip pain  Recurrence of pain.  Trial of meloxicam  -     meloxicam (MOBIC) 15 MG tablet; Take 1 tablet (15 mg total) by mouth once daily.    Wheezing  -     albuterol (VENTOLIN HFA) 90 mcg/actuation inhaler; Inhale 2 puffs into the lungs every 6 (six) hours as needed for Wheezing. Rescue        Assessment & Plan    LEFT HIP PAIN (TROCHANTERIC BURSITIS):  - Started oral anti-inflammatory medication, to be taken daily starting tomorrow.  - Continued meloxicam as needed for pain relief.    WHEEZING:  - Follow up in 3 months for reassessment of left hip pain and wheezing.          Naomi Franklin MD        This note was generated with the assistance of ambient listening technology. Verbal consent was obtained by the patient and accompanying visitor(s) for the recording of patient appointment to facilitate this note. I attest to having reviewed and edited the generated note for accuracy, though some syntax or spelling errors may persist. Please contact the author of this note for any clarification.

## 2024-12-13 DIAGNOSIS — L73.2 HIDRADENITIS SUPPURATIVA: ICD-10-CM

## 2024-12-16 RX ORDER — METFORMIN HYDROCHLORIDE 500 MG/1
TABLET, EXTENDED RELEASE ORAL
Qty: 30 TABLET | Refills: 3 | Status: SHIPPED | OUTPATIENT
Start: 2024-12-16

## 2025-01-16 ENCOUNTER — OFFICE VISIT (OUTPATIENT)
Dept: BARIATRICS | Facility: CLINIC | Age: 55
End: 2025-01-16
Payer: COMMERCIAL

## 2025-01-16 VITALS
DIASTOLIC BLOOD PRESSURE: 73 MMHG | BODY MASS INDEX: 40.68 KG/M2 | WEIGHT: 222.38 LBS | HEART RATE: 94 BPM | SYSTOLIC BLOOD PRESSURE: 126 MMHG | OXYGEN SATURATION: 95 %

## 2025-01-16 DIAGNOSIS — L73.2 HIDRADENITIS SUPPURATIVA: ICD-10-CM

## 2025-01-16 DIAGNOSIS — E66.813 CLASS 3 SEVERE OBESITY DUE TO EXCESS CALORIES WITH SERIOUS COMORBIDITY AND BODY MASS INDEX (BMI) OF 40.0 TO 44.9 IN ADULT: Primary | ICD-10-CM

## 2025-01-16 DIAGNOSIS — E66.01 CLASS 3 SEVERE OBESITY DUE TO EXCESS CALORIES WITH SERIOUS COMORBIDITY AND BODY MASS INDEX (BMI) OF 40.0 TO 44.9 IN ADULT: Primary | ICD-10-CM

## 2025-01-16 PROCEDURE — 1160F RVW MEDS BY RX/DR IN RCRD: CPT | Mod: CPTII,S$GLB,, | Performed by: INTERNAL MEDICINE

## 2025-01-16 PROCEDURE — 99214 OFFICE O/P EST MOD 30 MIN: CPT | Mod: S$GLB,,, | Performed by: INTERNAL MEDICINE

## 2025-01-16 PROCEDURE — 3074F SYST BP LT 130 MM HG: CPT | Mod: CPTII,S$GLB,, | Performed by: INTERNAL MEDICINE

## 2025-01-16 PROCEDURE — 3008F BODY MASS INDEX DOCD: CPT | Mod: CPTII,S$GLB,, | Performed by: INTERNAL MEDICINE

## 2025-01-16 PROCEDURE — 99999 PR PBB SHADOW E&M-EST. PATIENT-LVL IV: CPT | Mod: PBBFAC,,, | Performed by: INTERNAL MEDICINE

## 2025-01-16 PROCEDURE — 1159F MED LIST DOCD IN RCRD: CPT | Mod: CPTII,S$GLB,, | Performed by: INTERNAL MEDICINE

## 2025-01-16 PROCEDURE — 3078F DIAST BP <80 MM HG: CPT | Mod: CPTII,S$GLB,, | Performed by: INTERNAL MEDICINE

## 2025-01-16 RX ORDER — METFORMIN HYDROCHLORIDE 500 MG/1
TABLET, EXTENDED RELEASE ORAL
Qty: 30 TABLET | Refills: 1 | Status: SHIPPED | OUTPATIENT
Start: 2025-01-16

## 2025-01-16 NOTE — PROGRESS NOTES
Subjective     Patient ID: Kathrine Ashraf is a 54 y.o. female.    Chief Complaint: Follow-up    CC:weight    Pt here today for follow-up. Has gained 0.9  lbs (-0.9 lbs muscle. +2.7 lbs fat). Was to start 1000 hiram PB meal planner, exercise and started  topiramate 25 mg BID. States she had forgetfulness on it. She has stopped it. She was losing weight when she was on it.   Has not made eating changes. Has been eating sweets.   Walking 30 min a day 5 x a week. Added a few exercises with resistance bands.     New BMR: 1364  New PBF: 54.4%         Initial:  BMR: 1380  PBF:  53.5%      History of medication for loss: Phentermine from Tonya Lange MD in 2022.   checked today.She is also on metformin for HS.   diethylpropion, last filled 7/25/24. Had some mood changes with the diethylpropion, so had to stop it.        Review of Systems       Objective   /73 (BP Location: Left arm, Patient Position: Sitting)   Pulse 94   Wt 100.9 kg (222 lb 6.4 oz)   LMP 01/01/2012   SpO2 95%   BMI 40.68 kg/m²     Physical Exam  Vitals reviewed.   Constitutional:       General: She is not in acute distress.     Appearance: She is well-developed.      Comments: With severe obesity     HENT:      Head: Normocephalic and atraumatic.   Eyes:      General: No scleral icterus.     Pupils: Pupils are equal, round, and reactive to light.   Cardiovascular:      Rate and Rhythm: Normal rate.   Pulmonary:      Effort: Pulmonary effort is normal. No respiratory distress.   Musculoskeletal:         General: Normal range of motion.   Skin:     General: Skin is warm and dry.      Findings: No erythema.   Neurological:      Mental Status: She is alert and oriented to person, place, and time.      Cranial Nerves: No cranial nerve deficit.   Psychiatric:         Behavior: Behavior normal.         Judgment: Judgment normal.            Assessment and Plan     1. Class 3 severe obesity due to excess calories with serious comorbidity and body  mass index (BMI) of 40.0 to 44.9 in adult  -     Ambulatory referral/consult to Bariatric Surgery; Future; Expected date: 01/23/2025    2. Hidradenitis suppurativa  Overview:  9/22/23 1st visit to [facility] for Hidradenitis Suppurativa.     Year started: 2003 - started in 30s  Location started: axilla  Family history of HS: children, father  Smokes: none  Current weight: 230# (stable)  Flares q month for last few years    Last seen 8/29/2023 by Dr. Cortez for same.     Past treatments (including topicals, orals, injectables, laser, surgery):  I&D  Antibiotic ointment  Spironolactone 50 mg 1x/day (Pt took twice - stopped due to diarrhea) and started having leg cramps    Current treatments:   Clindamycin soln 2x/day  Dial soap daily    In the past, pt has been diagnosed with:    PCOS: none  Diabetes: none  HTN: none  Arthritis: OA  Cancer: none    Pt c/o new lesion on left axilla x 1 week. Tender and draining+      Orders:  -     metFORMIN (GLUCOPHAGE-XR) 500 MG ER 24hr tablet; TAKE 1 TABLET BY MOUTH EVERY DAY  Dispense: 30 tablet; Refill: 1        Kathrine was seen today for follow-up.    Diagnoses and all orders for this visit:    Class 3 severe obesity due to excess calories with serious comorbidity and body mass index (BMI) of 40.0 to 44.9 in adult  -     Ambulatory referral/consult to Bariatric Surgery; Future    Hidradenitis suppurativa  -     metFORMIN (GLUCOPHAGE-XR) 500 MG ER 24hr tablet; TAKE 1 TABLET BY MOUTH EVERY DAY          Medical and surgical options discussed today. Seminar info given.   Discussed Zepbound vials, but she does not think she can do the injections with a needle.     Discussed strategies for developing new coping mechanisms and becoming more self aware of eating behavior.        Have a plan in place for coping with emotions if you get the urge to make poor food choices- Set a timer for 10-15 minutes and find a way to distract yourself. Take a walk, drink water, write down how you feel.      Eliminate temptation where possible. Limit foods available that you know you tend to overeat. Eat proteins first.       Consider keeping a food log.      Add some type of resistance training 2-3 days a week. These can be body weight exercises, light weight or elastic bands. [employer] and [employer] are great sources for free work out plans and videos.       Increase low impact activity as tolerated.  Avoid high impact activity, very heavy lifting or other exercise regimens that may cause discomfort.         No soda, sweet tea, juices or lemonade. All drinks should be 5 calories or less.     Patient counseled in strategies for long term weight loss and maintenance: Keeping a food diary, exercise for 1 hour a day and eating more protein than carbohydrates.    1000 hiram pb meal planner, meal ideas and exercise handouts given previously     Weight training handouts given      Follow up with [facility] to check surgery benefits. Does not need to follow up  with me.   No follow-ups on file.

## 2025-01-16 NOTE — PATIENT INSTRUCTIONS
PLEASE ARRIVE 15-20 min EARLY FOR YOUR APPOINTMENTS. This allows us to perform all of the services for your appointment in a timely fashion. Arriving late for your appointment not only decreases the time available for the doctor to see you, it also leads to delays for every other patient scheduled after you. Patients arriving after their appointment time may be asked to wait until all patients that arrived on time are seen, and/or there is another available slot for you to be worked in. When this is not possible, you may be asked to reschedule.   Thank you for your consideration in this matter.           Discussed strategies for developing new coping mechanisms and becoming more self aware of eating behavior.        Have a plan in place for coping with emotions if you get the urge to make poor food choices- Set a timer for 10-15 minutes and find a way to distract yourself. Take a walk, drink water, write down how you feel.     Eliminate temptation where possible. Limit foods available that you know you tend to overeat. Eat proteins first.       Consider keeping a food log.      Add some type of resistance training 2-3 days a week. These can be body weight exercises, light weight or elastic bands. [employer] and [employer] are great sources for free work out plans and videos.       Increase low impact activity as tolerated.  Avoid high impact activity, very heavy lifting or other exercise regimens that may cause discomfort.         No soda, sweet tea, juices or lemonade. All drinks should be 5 calories or less.     Patient counseled in strategies for long term weight loss and maintenance: Keeping a food diary, exercise for 1 hour a day and eating more protein than carbohydrates.    1000 hiram pb meal planner, meal ideas and exercise handouts given previously    Sample Weight Training Plan ( adapted from [facility]):    1.Goblet Squat  How to:    Stand with feet hip-width apart and hold a weight vertically in  front of chest, elbows pointing toward the floor.  Push hips back and bend knees to lower into a squat.  Drive through heels to stand back up to starting position. That's 1 rep. Complete three to five reps with a heavy weight.      2.Bent-Over Row  How to:    With a dumbbell in each hand and feet under hips, hinge at hips with knees slightly bent and arms just in front of legs.  Drive one elbow back toward hips, feeling shoulder blades squeeze together, pulling weight toward side body.  Slowly lower weight back down, then repeat with other arm. That's 1 rep. Complete three to five reps with a medium-heavy weight.        3.Lateral Lunge  How to:    Holding a weight at chest or dumbbells at each side, stand up straight with feet hip-width apart.  Take a large step to the right, sit hips back, and lower down until right knee is nearly parallel with the floor. Your left leg should be straight.  Return to start. That's 1 rep. Complete 10 reps on each side.      4.Chest Press  How to:    Lie flat on back, or on a bench, with feet flat on the ground. With a dumbbell in each hand, extend arms directly over shoulders, palms facing toward feet.  Squeeze shoulder blades together and slowly bend elbows, lowering the weights out to the side, parallel with shoulders, until elbows form 90-degree angles.  Slowly drive the dumbbells back up to start, squeezing shoulder blades the entire time. Thats 1 rep. Complete three to five reps with a medium-heavy weight.      5.Split Stance Shoulder Press    How to:    Grab a pair of dumbbells or a resistance band. Stagger stance into a wide step, one foot forward and one back with hips squared, and hold the weights or band just above shoulders, elbows close to sides.  Leaning forward ever so slightly, bend both knees, and press through front heel while simultaneously lifting the weights or band to the celina, keeping elbows forward and arms in line with your ears.  Lower weights or band back to  shoulders. That's 1 rep. Do 10 reps, alternating side for each set.        6.Alternating Reverse Lunge To Bicep Curl  How to:    Grab a pair of dumbbells and hold them at arm's length next to sides, palms facing each other. Stand tall with feet hip-width apart.  Step backward with right leg and lower body until front knee is bent 90 degrees. At the same time as you lunge, curl both dumbbells up to shoulders.  Lower the dumbbells as you return to the starting position. Step back with the other leg and repeat.That's 1 rep. Complete 12 reps with a medium weight.

## 2025-01-27 ENCOUNTER — PATIENT MESSAGE (OUTPATIENT)
Dept: BARIATRICS | Facility: CLINIC | Age: 55
End: 2025-01-27
Payer: COMMERCIAL

## 2025-01-27 DIAGNOSIS — E66.01 CLASS 3 SEVERE OBESITY DUE TO EXCESS CALORIES WITH SERIOUS COMORBIDITY AND BODY MASS INDEX (BMI) OF 40.0 TO 44.9 IN ADULT: Primary | ICD-10-CM

## 2025-01-27 DIAGNOSIS — E66.813 CLASS 3 SEVERE OBESITY DUE TO EXCESS CALORIES WITH SERIOUS COMORBIDITY AND BODY MASS INDEX (BMI) OF 40.0 TO 44.9 IN ADULT: Primary | ICD-10-CM

## 2025-01-27 DIAGNOSIS — G47.33 OBSTRUCTIVE SLEEP APNEA SYNDROME: ICD-10-CM

## 2025-01-28 RX ORDER — TIRZEPATIDE 5 MG/.5ML
5 INJECTION, SOLUTION SUBCUTANEOUS
Qty: 2 ML | Refills: 3 | Status: SHIPPED | OUTPATIENT
Start: 2025-02-28

## 2025-01-28 RX ORDER — TIRZEPATIDE 2.5 MG/.5ML
2.5 INJECTION, SOLUTION SUBCUTANEOUS
Qty: 4 PEN | Refills: 0 | Status: SHIPPED | OUTPATIENT
Start: 2025-01-28 | End: 2025-01-28

## 2025-01-28 RX ORDER — TIRZEPATIDE 2.5 MG/.5ML
2.5 INJECTION, SOLUTION SUBCUTANEOUS
Qty: 2 ML | Refills: 0 | Status: SHIPPED | OUTPATIENT
Start: 2025-01-28

## 2025-02-05 ENCOUNTER — PATIENT MESSAGE (OUTPATIENT)
Dept: BARIATRICS | Facility: CLINIC | Age: 55
End: 2025-02-05
Payer: COMMERCIAL

## 2025-02-05 NOTE — TELEPHONE ENCOUNTER
Per sleep med note from Dr. Cutler at [facility] 2022. Pt's sleep study was neg for JOSE MARIA.

## 2025-02-06 ENCOUNTER — TELEPHONE (OUTPATIENT)
Dept: BARIATRICS | Facility: CLINIC | Age: 55
End: 2025-02-06
Payer: COMMERCIAL

## 2025-02-06 NOTE — TELEPHONE ENCOUNTER
Assist with calling pharmacy, informed patient that they are requesting a payment before medication can be sent out

## 2025-05-05 ENCOUNTER — OFFICE VISIT (OUTPATIENT)
Dept: OTOLARYNGOLOGY | Facility: CLINIC | Age: 55
End: 2025-05-05
Payer: COMMERCIAL

## 2025-05-05 VITALS
DIASTOLIC BLOOD PRESSURE: 82 MMHG | WEIGHT: 207 LBS | SYSTOLIC BLOOD PRESSURE: 121 MMHG | BODY MASS INDEX: 37.86 KG/M2 | HEART RATE: 86 BPM

## 2025-05-05 DIAGNOSIS — J34.89 NASAL OBSTRUCTION: ICD-10-CM

## 2025-05-05 DIAGNOSIS — J34.2 DEVIATED NASAL SEPTUM: ICD-10-CM

## 2025-05-05 DIAGNOSIS — J31.0 CHRONIC RHINITIS: Primary | ICD-10-CM

## 2025-05-05 DIAGNOSIS — J34.3 HYPERTROPHY OF INFERIOR NASAL TURBINATE: ICD-10-CM

## 2025-05-05 DIAGNOSIS — J34.829 INCOMPETENT NASAL VALVE: ICD-10-CM

## 2025-05-05 PROCEDURE — 31231 NASAL ENDOSCOPY DX: CPT | Mod: S$GLB,,, | Performed by: PHYSICIAN ASSISTANT

## 2025-05-05 PROCEDURE — 3079F DIAST BP 80-89 MM HG: CPT | Mod: CPTII,S$GLB,, | Performed by: PHYSICIAN ASSISTANT

## 2025-05-05 PROCEDURE — 3008F BODY MASS INDEX DOCD: CPT | Mod: CPTII,S$GLB,, | Performed by: PHYSICIAN ASSISTANT

## 2025-05-05 PROCEDURE — 99999 PR PBB SHADOW E&M-EST. PATIENT-LVL III: CPT | Mod: PBBFAC,,, | Performed by: PHYSICIAN ASSISTANT

## 2025-05-05 PROCEDURE — 3074F SYST BP LT 130 MM HG: CPT | Mod: CPTII,S$GLB,, | Performed by: PHYSICIAN ASSISTANT

## 2025-05-05 PROCEDURE — 1159F MED LIST DOCD IN RCRD: CPT | Mod: CPTII,S$GLB,, | Performed by: PHYSICIAN ASSISTANT

## 2025-05-05 PROCEDURE — 99204 OFFICE O/P NEW MOD 45 MIN: CPT | Mod: 25,S$GLB,, | Performed by: PHYSICIAN ASSISTANT

## 2025-05-05 RX ORDER — IPRATROPIUM BROMIDE 21 UG/1
2 SPRAY, METERED NASAL 3 TIMES DAILY PRN
Qty: 30 ML | Refills: 2 | Status: SHIPPED | OUTPATIENT
Start: 2025-05-05

## 2025-05-05 RX ORDER — FLUTICASONE PROPIONATE 50 MCG
2 SPRAY, SUSPENSION (ML) NASAL DAILY
Qty: 16 G | Refills: 11 | Status: SHIPPED | OUTPATIENT
Start: 2025-05-05 | End: 2026-05-05

## 2025-05-05 NOTE — PROCEDURES
Nasal/sinus endoscopy    Date/Time: 5/5/2025 3:30 PM    Performed by: Afshin Meza PA-C  Authorized by: Afshin Meza PA-C    Consent Done?:  Yes (Verbal)  Anesthesia:     Local anesthetic:  Phenylephrine spray    Type of Endoscope:  Flexible    Patient tolerance:  Patient tolerated the procedure well with no immediate complications  Nose:     Procedure Performed:  Nasal Endoscopy  External:      No external nasal deformity  Intranasal:      Mucosa no polyps     Mucosa ulcers not present     No mucosa lesions present     Enlarged turbinates     Septum gross deformity (left anterior septal deviation)  Nasopharynx:      No mucosa lesions     Adenoids present     Posterior choanae patent     Eustachian tube patent

## 2025-05-05 NOTE — PROGRESS NOTES
"Subjective:     HPI: Kathrine Ashraf is a 55 y.o. female who was self-referred for nasal congestion.    Patient reports daily, perennial left more than right nasal obstruction with associated sniffling, hyposmia, and sneezing.  Patient feels a cloudy sensation in her ears at times but denies any ear pain.  Patient reports previously using breathe right strips for her nasal obstruction and recently transitioned to a reusable device which has improved her symptoms at night.  Patient has used nasal sprays in the past but finds that Flonase specifically tends to make worse congestion in her nose.  She denies a history of recurrent sinus infections and denies persistent rhinorrhea.      Current sinonasal medications as above.  The last course of antibiotics was a long time ago.    She recalls previously having remote allergy testing, which was reportedly positive for pecans .  She denies a history of asthma.  She relates a history of reflux symptoms which is not currently managed with medication.    She relates a diagnosis of "mild" obstructive sleep apnea without regular CPAP use.   She does not recall a prior history of nasal trauma.  She has not had sinonasal surgery.    She has not had a tonsillectomy.  She is not a tobacco smoker.     SNOT-22 score: : (Patient-Rptd) (P) 77  NOSE score:: (Patient-Rptd) (P) 75%  ETDQ-7 score:: (Patient-Rptd) (P) 4.3    Past Medical/Past Surgical History  Past Medical History:   Diagnosis Date    Allergy     Anxiety     Axillary hidradenitis suppurativa     Hypercholesterolemia     Obesity      She has a past surgical history that includes Tubal ligation; Hysterectomy;  section; and Colonoscopy (N/A, 2022).    Family History/Social History  Her family history includes Breast cancer in her maternal aunt and paternal aunt; Cancer in her father; Heart disease in her mother; Hypertension in her mother.  She reports that she has never smoked. She has never used smokeless " tobacco. She reports current alcohol use. She reports that she does not use drugs.    Allergies/Immunizations  She is allergic to cyanocobalamin (vitamin b-12), penicillins, sulfamethoxazole-trimethoprim, and sulfa (sulfonamide antibiotics).  Immunization History   Administered Date(s) Administered    COVID-19, MRNA, LN-S, PF (Pfizer) ([place]) 07/11/2022    MMR 08/23/2001    PPD Test 12/06/2021    Tdap 01/18/2011        Medications   albuterol  busPIRone  hydrOXYzine HCL  meloxicam  metFORMIN  multivitamin  ZEPBOUND Soln     Review of Systems     Constitutional: Positive for fatigue.      HENT: Positive for postnasal drip, sinus pressure, sore throat, dental problems and voice change.      Eyes:  Positive for eye drainage, eye itching and photophobia.     Respiratory:  Positive for cough, sleep apnea, snoring and wheezing.      Cardiovascular:  Positive for foot swelling.     Gastrointestinal:  Positive for acid reflux and constipation.     Genitourinary: Negative for difficulty urinating, sexual problems and frequent urination.     Musc: Positive for aching muscles, back pain and neck pain.     Allergy: Positive for food allergies and seasonal allergies.     Endocrine: Positive for cold intolerance.     Neurological: Positive for dizziness, headaches and light-headedness.     Hematologic: Positive for bruises/bleeds easily.     Psychiatric: Positive for decreased concentration, nervous/anxious and sleep disturbance.           Objective:     /82 (BP Location: Right arm, Patient Position: Sitting)   Pulse 86   Wt 93.9 kg (207 lb 0.2 oz)   LMP 01/01/2012   BMI 37.86 kg/m²      Physical Exam  Constitutional:       Appearance: Normal appearance.   HENT:      Head: Normocephalic and atraumatic.      Right Ear: Tympanic membrane, ear canal and external ear normal.      Left Ear: Tympanic membrane, ear canal and external ear normal.      Nose: Septal deviation (left) present.      Right Nostril: No epistaxis.       Left Nostril: No epistaxis.      Right Turbinates: Enlarged.      Left Turbinates: Enlarged.      Right Sinus: No maxillary sinus tenderness or frontal sinus tenderness.      Left Sinus: No maxillary sinus tenderness or frontal sinus tenderness.      Comments: Positive Modified Tensas's     Mouth/Throat:      Lips: Pink.      Mouth: Mucous membranes are moist.      Tongue: No lesions. Tongue does not deviate from midline.      Palate: No mass and lesions.      Pharynx: Oropharynx is clear. Uvula midline.      Tonsils: No tonsillar exudate or tonsillar abscesses. 1+ on the right. 1+ on the left.   Pulmonary:      Effort: Pulmonary effort is normal.   Neurological:      Mental Status: She is alert.          Procedure    Nasal endoscopy performed.  See procedure note.     R IT     R MT     R ET     L NV     L IT     L MT     L ET      Data Reviewed  I personally reviewed the chart, including any outside records, and pertinent data below:    I reviewed the following notes: Internal Medicine     WBC (K/uL)   Date Value   06/18/2024 4.06     Lymph # (K/uL)   Date Value   06/18/2024 1.6     Lymph % (%)   Date Value   06/18/2024 38.7     Eosinophil % (%)   Date Value   06/18/2024 4.2     Total IgE (U/ml)   Date Value   05/12/2009 412 (H)     Eos # (K/uL)   Date Value   06/18/2024 0.2       Platelets (K/uL)   Date Value   06/18/2024 226     Glucose (mg/dL)   Date Value   06/18/2024 94       No sinus imaging available.    Assessment & Plan:     1. Chronic rhinitis  -     recurrent sniffling without evidence of polyposis, mass, or significant mucus production  -  May be vasomotor in nature vs tic vs AR vs other  -  START atrovent trial  -     ipratropium (ATROVENT) 21 mcg (0.03 %) nasal spray; 2 sprays by Each Nostril route 3 (three) times daily as needed (runny nose).  Dispense: 30 mL; Refill: 2    2. Nasal obstruction  3. Hypertrophy of inferior nasal turbinate  4. Deviated nasal septum  5. Incompetent nasal valve  -  likely a combo of above contributing to left nasal obstruction  - START INCS daily  - continue nasal valve device  - if interested, can undergo surgical evaluation    She will  follow up as needed  I had a discussion with the patient regarding her condition and the further workup and management options.    All questions were answered, and the patient is in agreement with the above.     Disclaimer:  This note may have been prepared utilizing voice recognition software which may result in occasional typographical errors in the text such as sound alike words.   If further clarification is needed, please contact the ENT department of [facility].

## 2025-05-19 ENCOUNTER — HOSPITAL ENCOUNTER (OUTPATIENT)
Dept: RADIOLOGY | Facility: OTHER | Age: 55
Discharge: HOME | End: 2025-05-19
Attending: FAMILY MEDICINE
Payer: COMMERCIAL

## 2025-05-19 DIAGNOSIS — Z12.31 ENCOUNTER FOR SCREENING MAMMOGRAM FOR BREAST CANCER: ICD-10-CM

## 2025-05-19 PROCEDURE — 77067 SCR MAMMO BI INCL CAD: CPT | Mod: 26,,, | Performed by: RADIOLOGY

## 2025-05-19 PROCEDURE — 77067 SCR MAMMO BI INCL CAD: CPT | Mod: TC

## 2025-05-19 PROCEDURE — 77063 BREAST TOMOSYNTHESIS BI: CPT | Mod: 26,,, | Performed by: RADIOLOGY

## 2025-05-26 ENCOUNTER — RESULTS FOLLOW-UP (OUTPATIENT)
Dept: PRIMARY CARE CLINIC | Facility: CLINIC | Age: 55
End: 2025-05-26

## 2025-05-28 ENCOUNTER — PATIENT MESSAGE (OUTPATIENT)
Dept: BARIATRICS | Facility: CLINIC | Age: 55
End: 2025-05-28
Payer: COMMERCIAL

## 2025-06-04 ENCOUNTER — TELEPHONE (OUTPATIENT)
Dept: PRIMARY CARE CLINIC | Facility: CLINIC | Age: 55
End: 2025-06-04
Payer: COMMERCIAL

## 2025-06-10 ENCOUNTER — OFFICE VISIT (OUTPATIENT)
Dept: BARIATRICS | Facility: CLINIC | Age: 55
End: 2025-06-10
Payer: COMMERCIAL

## 2025-06-10 VITALS
HEIGHT: 62 IN | DIASTOLIC BLOOD PRESSURE: 68 MMHG | WEIGHT: 205.31 LBS | BODY MASS INDEX: 37.78 KG/M2 | SYSTOLIC BLOOD PRESSURE: 139 MMHG | HEART RATE: 70 BPM | OXYGEN SATURATION: 100 %

## 2025-06-10 DIAGNOSIS — E66.812 CLASS 2 SEVERE OBESITY WITH BODY MASS INDEX (BMI) OF 35 TO 39.9 WITH SERIOUS COMORBIDITY: Primary | ICD-10-CM

## 2025-06-10 DIAGNOSIS — E66.01 CLASS 2 SEVERE OBESITY WITH BODY MASS INDEX (BMI) OF 35 TO 39.9 WITH SERIOUS COMORBIDITY: Primary | ICD-10-CM

## 2025-06-10 PROCEDURE — 1160F RVW MEDS BY RX/DR IN RCRD: CPT | Mod: CPTII,S$GLB,, | Performed by: INTERNAL MEDICINE

## 2025-06-10 PROCEDURE — 3078F DIAST BP <80 MM HG: CPT | Mod: CPTII,S$GLB,, | Performed by: INTERNAL MEDICINE

## 2025-06-10 PROCEDURE — 3008F BODY MASS INDEX DOCD: CPT | Mod: CPTII,S$GLB,, | Performed by: INTERNAL MEDICINE

## 2025-06-10 PROCEDURE — 99999 PR PBB SHADOW E&M-EST. PATIENT-LVL IV: CPT | Mod: PBBFAC,,, | Performed by: INTERNAL MEDICINE

## 2025-06-10 PROCEDURE — 3075F SYST BP GE 130 - 139MM HG: CPT | Mod: CPTII,S$GLB,, | Performed by: INTERNAL MEDICINE

## 2025-06-10 PROCEDURE — 1159F MED LIST DOCD IN RCRD: CPT | Mod: CPTII,S$GLB,, | Performed by: INTERNAL MEDICINE

## 2025-06-10 PROCEDURE — 99213 OFFICE O/P EST LOW 20 MIN: CPT | Mod: S$GLB,,, | Performed by: INTERNAL MEDICINE

## 2025-06-10 RX ORDER — TIRZEPATIDE 7.5 MG/.5ML
7.5 INJECTION, SOLUTION SUBCUTANEOUS
Qty: 2 ML | Refills: 4 | Status: SHIPPED | OUTPATIENT
Start: 2025-06-10

## 2025-06-10 NOTE — PATIENT INSTRUCTIONS
Start Mounjaro 7.5 mg once a week        Decrease portions as soon as you start Mounjaro. Avoid fried, greasy, fatty foods.     Some nausea in the first 2 weeks is not unusual.     If you get pain across the upper abdomen and around to your back, please call the office.            https://[email or web address]/savings-resources for coupon/videos.       Redness and swelling where you are giving yourself your medication is an injection site reaction, which can be unpleasant, but is not dangerous. Here are a few things to try.   - Take the syringe out of the fridge and let it come to room temp before injecting.   - Use a different site, the thigh or arm.   - you can take claritin or zyrtec the day of your injection.   - apply hydrocortisone cream or diclofenac gel to the injection site.       Add some type of resistance training 2-3 days a week. These can be body weight exercises, light weight or elastic bands. [employer] and [employer] are great sources for free work out plans and videos.       Increase low impact activity as tolerated.  Avoid high impact activity, very heavy lifting or other exercise regimens that may cause discomfort.         No soda, sweet tea, juices or lemonade. All drinks should be 5 calories or less.     Patient counseled in strategies for long term weight loss and maintenance: Keeping a food diary, exercise for 1 hour a day and eating more protein than carbohydrates.    1000 hiram pb meal planner, meal ideas and exercise handouts given previously      Tips for preparing for hurricane season:    If you are on weight loss medications, please take your medication with you in case of evacuation. Injectable medications should be transported with an ice pack if unopened or room temperature if you have started to use them.   Hurricane supplies do not necessarily need to be junk food or high in carbs or sugar. Shelf stable and healthy choices to include in your supplies could include:  Canned/packets of tuna  or chicken  Apples, oranges, banana, pears  Beef or turkey jerky  Sugar free pudding  Pickle, olives, dill relish (mix with the tuna or chicken!)  Low sodium or no salt added canned vegetables  If you get bread, get lite bread (40 calories a slice)  0 sugar sports drinks  Water  String cheese will be okay for a few days without refrigeration or in an ice chest.   Peanut or other nut butter.   Parmesan crisps  Pork skins  Protein shakes (20-30g protein, less than 5 g sugar)  Protein bars (15 or more g protein, less than 5 g sugar)  Don't forget disposable forks, spoons, plates in your supplies  If evacuated, manage stress by taking walks, reading or meditating.   If eating out make better choices when possible.   Salads with a lean protein and limited dressing, cheese or other toppings  Grilled chicken sandwich or burger without the bun.   Skip the fries!  Order water or unsweetened tea instead of soda  Grocery stores with a deli, salad/food bar can be a good quick and affordable option to replace fast food

## 2025-06-10 NOTE — PROGRESS NOTES
"Subjective     Patient ID: Kathrine Ashraf is a 55 y.o. female.    Chief Complaint: Follow-up    CC:weight    Pt here today for follow-up. Has lost 16.1 lbs (-4 lbs muscle. -10.1 lbs fat). Was to start 1000 hiram PB meal planner, exercise and started  zepbound currently on 5 mg weekly. Denies SE. Does feel like her appetite is down. She has some occasional sweets cravings.   Has made more eating changes.    Walking 30 min a day 5 x a week. Added a few exercise classes.     New BMR: 1319  New PBF: 52.8%         Initial:  BMR: 1380  PBF:  53.5%      History of medication for loss: Phentermine from Tonya Lange MD in 2022.   checked today.She is also on metformin for HS.   diethylpropion, last filled 7/25/24. Had some mood changes with the diethylpropion, so had to stop it.topiramate 25 mg BID. States she had forgetfulness on it. She has stopped it. She was losing weight when she was on it.         Review of Systems       Objective   /68   Pulse 70   Ht 5' 2" (1.575 m)   Wt 93.1 kg (205 lb 4.8 oz)   LMP 01/01/2012   SpO2 100%   BMI 37.55 kg/m²     Physical Exam  Vitals reviewed.   Constitutional:       General: She is not in acute distress.     Appearance: She is well-developed.      Comments: With severe obesity     HENT:      Head: Normocephalic and atraumatic.   Eyes:      General: No scleral icterus.     Pupils: Pupils are equal, round, and reactive to light.   Cardiovascular:      Rate and Rhythm: Normal rate.   Pulmonary:      Effort: Pulmonary effort is normal. No respiratory distress.   Musculoskeletal:         General: Normal range of motion.   Skin:     General: Skin is warm and dry.      Findings: No erythema.   Neurological:      Mental Status: She is alert and oriented to person, place, and time.      Cranial Nerves: No cranial nerve deficit.   Psychiatric:         Behavior: Behavior normal.         Judgment: Judgment normal.            Assessment and Plan     1. Class 2 severe obesity " with body mass index (BMI) of 35 to 39.9 with serious comorbidity  -     tirzepatide, weight loss, (ZEPBOUND) 7.5 mg/0.5 mL PnIj; Inject 7.5 mg into the skin every 7 days.  Dispense: 2 mL; Refill: 4          Kathrine was seen today for follow-up.    Diagnoses and all orders for this visit:    Class 2 severe obesity with body mass index (BMI) of 35 to 39.9 with serious comorbidity  -     tirzepatide, weight loss, (ZEPBOUND) 7.5 mg/0.5 mL PnIj; Inject 7.5 mg into the skin every 7 days.            Start Mounjaro 7.5 mg once a week       Decrease portions as soon as you start Mounjaro. Avoid fried, greasy, fatty foods.     Some nausea in the first 2 weeks is not unusual.     If you get pain across the upper abdomen and around to your back, please call the office.            https://[email or web address]/savings-resources for coupon/videos.       Add some type of resistance training 2-3 days a week. These can be body weight exercises, light weight or elastic bands. [employer] and [employer] are great sources for free work out plans and videos.       Increase low impact activity as tolerated.  Avoid high impact activity, very heavy lifting or other exercise regimens that may cause discomfort.         No soda, sweet tea, juices or lemonade. All drinks should be 5 calories or less.     Patient counseled in strategies for long term weight loss and maintenance: Keeping a food diary, exercise for 1 hour a day and eating more protein than carbohydrates.    1000 hiram pb meal planner, meal ideas and exercise handouts given previously    Hurricane tips given         No follow-ups on file.

## 2025-07-22 ENCOUNTER — LAB VISIT (OUTPATIENT)
Dept: LAB | Facility: HOSPITAL | Age: 55
End: 2025-07-22
Attending: FAMILY MEDICINE
Payer: COMMERCIAL

## 2025-07-22 ENCOUNTER — OFFICE VISIT (OUTPATIENT)
Dept: PRIMARY CARE CLINIC | Facility: CLINIC | Age: 55
End: 2025-07-22
Payer: COMMERCIAL

## 2025-07-22 VITALS
OXYGEN SATURATION: 98 % | SYSTOLIC BLOOD PRESSURE: 120 MMHG | BODY MASS INDEX: 37.21 KG/M2 | TEMPERATURE: 98 F | HEART RATE: 72 BPM | HEIGHT: 62 IN | WEIGHT: 202.19 LBS | DIASTOLIC BLOOD PRESSURE: 70 MMHG

## 2025-07-22 DIAGNOSIS — Z00.00 ROUTINE GENERAL MEDICAL EXAMINATION AT A HEALTH CARE FACILITY: ICD-10-CM

## 2025-07-22 DIAGNOSIS — Z00.00 ROUTINE GENERAL MEDICAL EXAMINATION AT A HEALTH CARE FACILITY: Primary | ICD-10-CM

## 2025-07-22 DIAGNOSIS — F41.9 ANXIETY: ICD-10-CM

## 2025-07-22 DIAGNOSIS — E66.01 MORBID OBESITY DUE TO EXCESS CALORIES: ICD-10-CM

## 2025-07-22 DIAGNOSIS — M54.50 ACUTE LEFT-SIDED LOW BACK PAIN WITHOUT SCIATICA: ICD-10-CM

## 2025-07-22 DIAGNOSIS — M70.62 TROCHANTERIC BURSITIS, LEFT HIP: ICD-10-CM

## 2025-07-22 PROBLEM — M54.32 SCIATICA OF LEFT SIDE: Status: RESOLVED | Noted: 2022-01-26 | Resolved: 2025-07-22

## 2025-07-22 PROBLEM — F32.A ANXIETY AND DEPRESSION: Status: RESOLVED | Noted: 2020-01-31 | Resolved: 2025-07-22

## 2025-07-22 LAB
ALBUMIN SERPL BCP-MCNC: 3.9 G/DL (ref 3.5–5.2)
ALP SERPL-CCNC: 101 UNIT/L (ref 40–150)
ALT SERPL W/O P-5'-P-CCNC: 20 UNIT/L (ref 10–44)
ANION GAP (OHS): 10 MMOL/L (ref 8–16)
AST SERPL-CCNC: 20 UNIT/L (ref 11–45)
BILIRUB SERPL-MCNC: 0.6 MG/DL (ref 0.1–1)
BUN SERPL-MCNC: 14 MG/DL (ref 6–20)
CALCIUM SERPL-MCNC: 9.4 MG/DL (ref 8.7–10.5)
CHLORIDE SERPL-SCNC: 105 MMOL/L (ref 95–110)
CHOLEST SERPL-MCNC: 221 MG/DL (ref 120–199)
CHOLEST/HDLC SERPL: 2.5 {RATIO} (ref 2–5)
CO2 SERPL-SCNC: 23 MMOL/L (ref 23–29)
CREAT SERPL-MCNC: 1 MG/DL (ref 0.5–1.4)
ERYTHROCYTE [DISTWIDTH] IN BLOOD BY AUTOMATED COUNT: 13.6 % (ref 11.5–14.5)
GFR SERPLBLD CREATININE-BSD FMLA CKD-EPI: >60 ML/MIN/1.73/M2
GLUCOSE SERPL-MCNC: 74 MG/DL (ref 70–110)
HCT VFR BLD AUTO: 37.5 % (ref 37–48.5)
HDLC SERPL-MCNC: 87 MG/DL (ref 40–75)
HDLC SERPL: 39.4 % (ref 20–50)
HGB BLD-MCNC: 12.4 GM/DL (ref 12–16)
LDLC SERPL CALC-MCNC: 124.8 MG/DL (ref 63–159)
MCH RBC QN AUTO: 31.9 PG (ref 27–31)
MCHC RBC AUTO-ENTMCNC: 33.1 G/DL (ref 32–36)
MCV RBC AUTO: 96 FL (ref 82–98)
NONHDLC SERPL-MCNC: 134 MG/DL
PLATELET # BLD AUTO: 217 K/UL (ref 150–450)
PMV BLD AUTO: 12.2 FL (ref 9.2–12.9)
POTASSIUM SERPL-SCNC: 3.8 MMOL/L (ref 3.5–5.1)
PROT SERPL-MCNC: 7.1 GM/DL (ref 6–8.4)
RBC # BLD AUTO: 3.89 M/UL (ref 4–5.4)
SODIUM SERPL-SCNC: 138 MMOL/L (ref 136–145)
TRIGL SERPL-MCNC: 46 MG/DL (ref 30–150)
WBC # BLD AUTO: 5.32 K/UL (ref 3.9–12.7)

## 2025-07-22 PROCEDURE — 80061 LIPID PANEL: CPT

## 2025-07-22 PROCEDURE — 3008F BODY MASS INDEX DOCD: CPT | Mod: CPTII,S$GLB,, | Performed by: FAMILY MEDICINE

## 2025-07-22 PROCEDURE — 99999 PR PBB SHADOW E&M-EST. PATIENT-LVL IV: CPT | Mod: PBBFAC,,, | Performed by: FAMILY MEDICINE

## 2025-07-22 PROCEDURE — 85027 COMPLETE CBC AUTOMATED: CPT

## 2025-07-22 PROCEDURE — 99396 PREV VISIT EST AGE 40-64: CPT | Mod: S$GLB,,, | Performed by: FAMILY MEDICINE

## 2025-07-22 PROCEDURE — 80053 COMPREHEN METABOLIC PANEL: CPT

## 2025-07-22 PROCEDURE — 36415 COLL VENOUS BLD VENIPUNCTURE: CPT | Mod: PN

## 2025-07-22 PROCEDURE — 83036 HEMOGLOBIN GLYCOSYLATED A1C: CPT

## 2025-07-22 PROCEDURE — 1159F MED LIST DOCD IN RCRD: CPT | Mod: CPTII,S$GLB,, | Performed by: FAMILY MEDICINE

## 2025-07-22 PROCEDURE — 3078F DIAST BP <80 MM HG: CPT | Mod: CPTII,S$GLB,, | Performed by: FAMILY MEDICINE

## 2025-07-22 PROCEDURE — 3074F SYST BP LT 130 MM HG: CPT | Mod: CPTII,S$GLB,, | Performed by: FAMILY MEDICINE

## 2025-07-22 PROCEDURE — 1160F RVW MEDS BY RX/DR IN RCRD: CPT | Mod: CPTII,S$GLB,, | Performed by: FAMILY MEDICINE

## 2025-07-22 RX ORDER — PROPRANOLOL HYDROCHLORIDE 10 MG/1
10 TABLET ORAL DAILY PRN
Qty: 30 TABLET | Refills: 0 | Status: SHIPPED | OUTPATIENT
Start: 2025-07-22 | End: 2026-07-22

## 2025-07-22 RX ORDER — HYDROCORTISONE 25 MG/G
CREAM TOPICAL
COMMUNITY
Start: 2025-06-12

## 2025-07-22 NOTE — PROGRESS NOTES
Assessment:     1. Routine general medical examination at a health care facility    2. Acute left-sided low back pain without sciatica    3. Trochanteric bursitis, left hip    4. Anxiety    5. Morbid obesity due to excess calories      Plan:     Acute left-sided low back pain without sciatica  Circuit training x 2 weeks    Increased to zepbound 7.5 from 5 about 3 wks ago.     Walks 30 min x 5d, x yrs    Refer pain mgmt    Anxiety  She is starting Employee assistance program at work for support. Not suicidal. She declines BHI.     Propranolol 10 mg prn    Consider Trazodone for insomnia    PREVIOUS : fluoxetine depression, numb, paxil ok but wt gain, wellbutrin bad thoughts, buspar ok helped w sleep, uses Calm doris    Morbid obesity due to excess calories  Stable, follow w Bariatrics who prescribes Zepbound 7.5    Increase water intake until urine clear    Routine general medical examination at a health care facility  Follow with GYN for female health & cancer prevention  Move more, High fiber, good fat (avocado, olive oil, nuts) foods  Eat more food grown from the earth (picked from trees or out of the ground)  Colon cancer screening at 47 yo  Follow up yearly with [facility] ONE WEEK PRIOR so we can discuss at your visit      Trochanteric bursitis, left hip  Refer pain mgmt        Labs today    HPI: Kathrine Ashraf is a 55 y.o. female with is here today for establish care, annual &  left-sided pain    She follows with bariatriics and is taking Zepboundweight loss medication 5 mg weekly, walking 30 minutes 5 days a week    11/2024 evaluated by Dr. Franklin for trochanteric bursitis of the left hip and groin, treated with meloxicam    History of Present Illness    CHIEF COMPLAINT:  Patient presents today for left lower back pain for the past three weeks.    MUSCULOSKELETAL:  She reports left lower back and hip pain for the past three weeks, which began after increasing Zepbound dose to 7.5 mg.She denies any trauma or  "fall. She has done stretches, cold compresses, and OTC Motrin. She has been walking daily for years and started circuit training, including jumping jacks, approximately 2weeks ago.     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 She experiences difficulty initiating sleep .. She uses the Calm meditation application for symptom management. She will sign up for Employee assistance program     MEDICATION HISTORY:  Current medication includes Zepbound 7.5 mg for weight loss, increased from 5 mg three weeks ago. Previous psychiatric medication trials include Fluoxetine 20 mg (discontinued due to depression and emotional numbness), Paxil (discontinued due to weight gain), Wellbutrin (discontinued due to intrusive negative thoughts), and Buspirone (well-tolerated when taken regularly, used for anxiety and sleep difficulties).      ROS:  ROS as indicated in HPI.             Current Outpatient Medications   Medication Instructions    fluticasone propionate (FLONASE) 100 mcg, Each Nostril, Daily    hydrocortisone 2.5 % cream SMARTSIG:sparingly Topical Twice Daily    ipratropium (ATROVENT) 21 mcg (0.03 %) nasal spray 2 sprays, Each Nostril, 3 times daily PRN    meloxicam (MOBIC) 15 mg, Oral, Daily    multivitamin capsule 1 capsule, Daily    propranoloL (INDERAL) 10 mg, Oral, Daily PRN    ZEPBOUND 7.5 mg, Subcutaneous, Every 7 days       Lab Results   Component Value Date    HGBA1C 5.3 10/19/2023    HGBA1C 5.4 04/21/2022    HGBA1C 5.0 07/17/2020     No results found for: "MICALBCREAT"  Lab Results   Component Value Date    LDLCALC 117.2 10/19/2023    LDLCALC 163.0 (H) 12/02/2022    CHOL 212 (H) 10/19/2023    HDL 83 (H) 10/19/2023    TRIG 59 10/19/2023       Lab Results   Component Value Date     06/18/2024    K 3.8 06/18/2024     06/18/2024    CO2 23 06/18/2024    GLU 94 06/18/2024    BUN 11 06/18/2024    CREATININE 0.7 06/18/2024    CALCIUM 9.6 06/18/2024    " "PROT 7.1 2024    ALBUMIN 3.6 2024    BILITOT 0.9 2024    ALKPHOS 98 2024    AST 21 2024    ALT 24 2024    ANIONGAP 8 2024    ESTGFRAFRICA >60 10/18/2021    EGFRNONAA >60 10/18/2021    WBC 4.06 2024    HGB 13.3 2024    HGB 14.0 2022    HCT 42.0 2024    MCV 96 2024     2024    TSH 0.92 2022    HEPCAB Non-reactive 2024       Lab Results   Component Value Date    DPVETHYA31 1221 (H) 2022         Past Medical History:   Diagnosis Date    Allergy     Anxiety     Axillary hidradenitis suppurativa     Hypercholesterolemia     Obesity      Past Surgical History:   Procedure Laterality Date     SECTION      COLONOSCOPY N/A 2022    Procedure: COLONOSCOPY;  Surgeon: Franky Murcia MD;  Location: [address];  Service: Endoscopy;  Laterality: N/A;  -[facility]-inst portal-tb    HYSTERECTOMY      partial    TUBAL LIGATION       Vitals:    25 1223   BP: 120/70   Pulse: 72   Temp: 98 °F (36.7 °C)   TempSrc: Oral   SpO2: 98%   Weight: 91.7 kg (202 lb 2.6 oz)   Height: 5' 2" (1.575 m)   PainSc:   6   PainLoc: Back     Wt Readings from Last 5 Encounters:   25 91.7 kg (202 lb 2.6 oz)   06/10/25 93.1 kg (205 lb 4.8 oz)   25 93.9 kg (207 lb 0.2 oz)   25 100.9 kg (222 lb 6.4 oz)   24 99.3 kg (218 lb 14.7 oz)     Objective:   Physical Exam  Constitutional:       Appearance: She is well-developed.   Eyes:      Pupils: Pupils are equal, round, and reactive to light.   Cardiovascular:      Rate and Rhythm: Normal rate and regular rhythm.      Heart sounds: Normal heart sounds. No murmur heard.  Pulmonary:      Effort: Pulmonary effort is normal.      Breath sounds: Normal breath sounds. No wheezing.   Abdominal:      General: Bowel sounds are normal. There is no distension.      Palpations: Abdomen is soft. There is no mass.      Tenderness: There is no abdominal tenderness. " There is no guarding or rebound.   Musculoskeletal:      Cervical back: Neck supple.      Comments: Normal gait, can lean forward 90 degrees, and lean back and side to side without discomfort,  nontender lumbar spine, tender LEFT sided paraspinous musculature, no CVA tenderness, nontender sacroiliacs, tender LEFT trochanteric bursa, negative straight leg test, 2+ pulses     Skin:     General: Skin is warm and dry.   Neurological:      Mental Status: She is alert.   Psychiatric:         Behavior: Behavior normal.

## 2025-07-22 NOTE — ASSESSMENT & PLAN NOTE
Stable, follow w Bariatrics who prescribes Zepbound 7.5    Increase water intake until urine clear

## 2025-07-22 NOTE — ASSESSMENT & PLAN NOTE
She is starting Employee assistance program at work for support. Not suicidal. She declines BHI.     Propranolol 10 mg prn    Consider Trazodone for insomnia    PREVIOUS : fluoxetine depression, numb, paxil ok but wt gain, wellbutrin bad thoughts, buspar ok helped w sleep, uses Calm doris

## 2025-07-22 NOTE — ASSESSMENT & PLAN NOTE
Circuit training x 2 weeks    Increased to zepbound 7.5 from 5 about 3 wks ago.     Walks 30 min x 5d, x yrs    Refer pain mgmt

## 2025-07-23 LAB
EAG (OHS): 97 MG/DL (ref 68–131)
HBA1C MFR BLD: 5 % (ref 4–5.6)

## 2025-07-24 ENCOUNTER — PATIENT MESSAGE (OUTPATIENT)
Dept: PRIMARY CARE CLINIC | Facility: CLINIC | Age: 55
End: 2025-07-24
Payer: COMMERCIAL

## 2025-07-25 ENCOUNTER — PATIENT MESSAGE (OUTPATIENT)
Dept: HEMATOLOGY/ONCOLOGY | Facility: CLINIC | Age: 55
End: 2025-07-25
Payer: COMMERCIAL

## 2025-07-28 ENCOUNTER — PATIENT MESSAGE (OUTPATIENT)
Dept: HEMATOLOGY/ONCOLOGY | Facility: CLINIC | Age: 55
End: 2025-07-28
Payer: COMMERCIAL

## 2025-08-18 ENCOUNTER — TELEPHONE (OUTPATIENT)
Dept: HEMATOLOGY/ONCOLOGY | Facility: CLINIC | Age: 55
End: 2025-08-18
Payer: COMMERCIAL

## 2025-08-21 ENCOUNTER — OFFICE VISIT (OUTPATIENT)
Dept: PRIMARY CARE CLINIC | Facility: CLINIC | Age: 55
End: 2025-08-21
Payer: COMMERCIAL

## 2025-08-21 VITALS
SYSTOLIC BLOOD PRESSURE: 134 MMHG | HEIGHT: 62 IN | OXYGEN SATURATION: 99 % | DIASTOLIC BLOOD PRESSURE: 82 MMHG | HEART RATE: 82 BPM | BODY MASS INDEX: 36.68 KG/M2 | WEIGHT: 199.31 LBS | TEMPERATURE: 98 F

## 2025-08-21 DIAGNOSIS — R26.89 ANTALGIC GAIT: ICD-10-CM

## 2025-08-21 DIAGNOSIS — R10.32 LEFT GROIN PAIN: ICD-10-CM

## 2025-08-21 DIAGNOSIS — E66.01 MORBID OBESITY DUE TO EXCESS CALORIES: ICD-10-CM

## 2025-08-21 DIAGNOSIS — M70.62 GREATER TROCHANTERIC BURSITIS OF LEFT HIP: Primary | ICD-10-CM

## 2025-08-21 PROCEDURE — 3075F SYST BP GE 130 - 139MM HG: CPT | Mod: CPTII,S$GLB,, | Performed by: FAMILY MEDICINE

## 2025-08-21 PROCEDURE — 3044F HG A1C LEVEL LT 7.0%: CPT | Mod: CPTII,S$GLB,, | Performed by: FAMILY MEDICINE

## 2025-08-21 PROCEDURE — 3079F DIAST BP 80-89 MM HG: CPT | Mod: CPTII,S$GLB,, | Performed by: FAMILY MEDICINE

## 2025-08-21 PROCEDURE — 99214 OFFICE O/P EST MOD 30 MIN: CPT | Mod: S$GLB,,, | Performed by: FAMILY MEDICINE

## 2025-08-21 PROCEDURE — 1159F MED LIST DOCD IN RCRD: CPT | Mod: CPTII,S$GLB,, | Performed by: FAMILY MEDICINE

## 2025-08-21 PROCEDURE — 1160F RVW MEDS BY RX/DR IN RCRD: CPT | Mod: CPTII,S$GLB,, | Performed by: FAMILY MEDICINE

## 2025-08-21 PROCEDURE — G2211 COMPLEX E/M VISIT ADD ON: HCPCS | Mod: S$GLB,,, | Performed by: FAMILY MEDICINE

## 2025-08-21 PROCEDURE — 3008F BODY MASS INDEX DOCD: CPT | Mod: CPTII,S$GLB,, | Performed by: FAMILY MEDICINE

## 2025-08-21 PROCEDURE — 99999 PR PBB SHADOW E&M-EST. PATIENT-LVL IV: CPT | Mod: PBBFAC,,, | Performed by: FAMILY MEDICINE

## 2025-08-25 ENCOUNTER — TELEPHONE (OUTPATIENT)
Dept: HEMATOLOGY/ONCOLOGY | Facility: CLINIC | Age: 55
End: 2025-08-25
Payer: COMMERCIAL

## 2025-08-27 ENCOUNTER — TELEPHONE (OUTPATIENT)
Dept: PAIN MEDICINE | Facility: CLINIC | Age: 55
End: 2025-08-27
Payer: COMMERCIAL

## 2025-09-03 ENCOUNTER — TELEPHONE (OUTPATIENT)
Dept: PAIN MEDICINE | Facility: CLINIC | Age: 55
End: 2025-09-03
Payer: COMMERCIAL

## 2025-09-06 ENCOUNTER — OFFICE VISIT (OUTPATIENT)
Dept: URGENT CARE | Facility: CLINIC | Age: 55
End: 2025-09-06
Payer: COMMERCIAL

## 2025-09-06 VITALS
OXYGEN SATURATION: 100 % | TEMPERATURE: 99 F | RESPIRATION RATE: 18 BRPM | BODY MASS INDEX: 36.62 KG/M2 | WEIGHT: 199 LBS | HEART RATE: 84 BPM | SYSTOLIC BLOOD PRESSURE: 110 MMHG | DIASTOLIC BLOOD PRESSURE: 61 MMHG | HEIGHT: 62 IN

## 2025-09-06 DIAGNOSIS — T63.441A BEE STING, ACCIDENTAL OR UNINTENTIONAL, INITIAL ENCOUNTER: ICD-10-CM

## 2025-09-06 DIAGNOSIS — L03.312 CELLULITIS OF BACK EXCEPT BUTTOCK: Primary | ICD-10-CM

## 2025-09-06 RX ORDER — CLINDAMYCIN PHOSPHATE 150 MG/ML
300 INJECTION, SOLUTION INTRAVENOUS
Status: COMPLETED | OUTPATIENT
Start: 2025-09-06 | End: 2025-09-06

## 2025-09-06 RX ORDER — DOXYCYCLINE 100 MG/1
100 CAPSULE ORAL 2 TIMES DAILY
Qty: 14 CAPSULE | Refills: 0 | Status: SHIPPED | OUTPATIENT
Start: 2025-09-06 | End: 2025-09-13

## 2025-09-06 RX ADMIN — CLINDAMYCIN PHOSPHATE 300 MG: 150 INJECTION, SOLUTION INTRAVENOUS at 05:09
